# Patient Record
Sex: MALE | Race: WHITE | NOT HISPANIC OR LATINO | URBAN - METROPOLITAN AREA
[De-identification: names, ages, dates, MRNs, and addresses within clinical notes are randomized per-mention and may not be internally consistent; named-entity substitution may affect disease eponyms.]

---

## 2021-05-22 ENCOUNTER — INPATIENT (INPATIENT)
Facility: HOSPITAL | Age: 66
LOS: 5 days | Discharge: HOME HEALTH | DRG: 853 | End: 2021-05-28
Attending: HOSPITALIST | Admitting: HOSPITALIST
Payer: MEDICARE

## 2021-05-22 VITALS
HEART RATE: 109 BPM | DIASTOLIC BLOOD PRESSURE: 85 MMHG | SYSTOLIC BLOOD PRESSURE: 129 MMHG | HEIGHT: 71 IN | WEIGHT: 229.94 LBS | OXYGEN SATURATION: 96 % | TEMPERATURE: 99 F | RESPIRATION RATE: 18 BRPM

## 2021-05-22 DIAGNOSIS — N12 TUBULO-INTERSTITIAL NEPHRITIS, NOT SPECIFIED AS ACUTE OR CHRONIC: ICD-10-CM

## 2021-05-22 LAB
ALBUMIN SERPL ELPH-MCNC: 3.1 G/DL — LOW (ref 3.3–5)
ALP SERPL-CCNC: 126 U/L — HIGH (ref 40–120)
ALT FLD-CCNC: 81 U/L — HIGH (ref 10–45)
ANION GAP SERPL CALC-SCNC: 18 MMOL/L — HIGH (ref 5–17)
APPEARANCE UR: ABNORMAL
AST SERPL-CCNC: 76 U/L — HIGH (ref 10–40)
BACTERIA # UR AUTO: ABNORMAL
BASE EXCESS BLDV CALC-SCNC: 2.9 MMOL/L — HIGH (ref -2–2)
BASE EXCESS BLDV CALC-SCNC: 3.3 MMOL/L — HIGH (ref -2–2)
BASOPHILS # BLD AUTO: 0 K/UL — SIGNIFICANT CHANGE UP (ref 0–0.2)
BASOPHILS NFR BLD AUTO: 0 % — SIGNIFICANT CHANGE UP (ref 0–2)
BILIRUB SERPL-MCNC: 1 MG/DL — SIGNIFICANT CHANGE UP (ref 0.2–1.2)
BILIRUB UR-MCNC: NEGATIVE — SIGNIFICANT CHANGE UP
BUN SERPL-MCNC: 16 MG/DL — SIGNIFICANT CHANGE UP (ref 7–23)
CA-I SERPL-SCNC: 1.04 MMOL/L — LOW (ref 1.12–1.3)
CA-I SERPL-SCNC: 1.08 MMOL/L — LOW (ref 1.12–1.3)
CALCIUM SERPL-MCNC: 9.5 MG/DL — SIGNIFICANT CHANGE UP (ref 8.4–10.5)
CHLORIDE BLDV-SCNC: 97 MMOL/L — SIGNIFICANT CHANGE UP (ref 96–108)
CHLORIDE BLDV-SCNC: 98 MMOL/L — SIGNIFICANT CHANGE UP (ref 96–108)
CHLORIDE SERPL-SCNC: 95 MMOL/L — LOW (ref 96–108)
CO2 BLDV-SCNC: 27 MMOL/L — SIGNIFICANT CHANGE UP (ref 22–30)
CO2 BLDV-SCNC: 28 MMOL/L — SIGNIFICANT CHANGE UP (ref 22–30)
CO2 SERPL-SCNC: 20 MMOL/L — LOW (ref 22–31)
COLOR SPEC: YELLOW — SIGNIFICANT CHANGE UP
CREAT SERPL-MCNC: 0.88 MG/DL — SIGNIFICANT CHANGE UP (ref 0.5–1.3)
DIFF PNL FLD: ABNORMAL
EOSINOPHIL # BLD AUTO: 0 K/UL — SIGNIFICANT CHANGE UP (ref 0–0.5)
EOSINOPHIL NFR BLD AUTO: 0 % — SIGNIFICANT CHANGE UP (ref 0–6)
EPI CELLS # UR: 1 /HPF — SIGNIFICANT CHANGE UP
GAS PNL BLDV: 128 MMOL/L — LOW (ref 135–145)
GAS PNL BLDV: 130 MMOL/L — LOW (ref 135–145)
GAS PNL BLDV: SIGNIFICANT CHANGE UP
GLUCOSE BLDV-MCNC: 163 MG/DL — HIGH (ref 70–99)
GLUCOSE BLDV-MCNC: 238 MG/DL — HIGH (ref 70–99)
GLUCOSE SERPL-MCNC: 230 MG/DL — HIGH (ref 70–99)
GLUCOSE UR QL: NEGATIVE — SIGNIFICANT CHANGE UP
HCO3 BLDV-SCNC: 26 MMOL/L — SIGNIFICANT CHANGE UP (ref 21–29)
HCO3 BLDV-SCNC: 26 MMOL/L — SIGNIFICANT CHANGE UP (ref 21–29)
HCT VFR BLD CALC: 38.6 % — LOW (ref 39–50)
HCT VFR BLDA CALC: 36 % — LOW (ref 39–50)
HCT VFR BLDA CALC: 40 % — SIGNIFICANT CHANGE UP (ref 39–50)
HGB BLD CALC-MCNC: 11.6 G/DL — LOW (ref 13–17)
HGB BLD CALC-MCNC: 12.9 G/DL — LOW (ref 13–17)
HGB BLD-MCNC: 12.8 G/DL — LOW (ref 13–17)
HYALINE CASTS # UR AUTO: 0 /LPF — SIGNIFICANT CHANGE UP (ref 0–7)
KETONES UR-MCNC: NEGATIVE — SIGNIFICANT CHANGE UP
LACTATE BLDV-MCNC: 2.9 MMOL/L — HIGH (ref 0.7–2)
LACTATE BLDV-MCNC: 3 MMOL/L — HIGH (ref 0.7–2)
LEUKOCYTE ESTERASE UR-ACNC: ABNORMAL
LYMPHOCYTES # BLD AUTO: 0.99 K/UL — LOW (ref 1–3.3)
LYMPHOCYTES # BLD AUTO: 6.9 % — LOW (ref 13–44)
MANUAL SMEAR VERIFICATION: SIGNIFICANT CHANGE UP
MCHC RBC-ENTMCNC: 27.4 PG — SIGNIFICANT CHANGE UP (ref 27–34)
MCHC RBC-ENTMCNC: 33.2 GM/DL — SIGNIFICANT CHANGE UP (ref 32–36)
MCV RBC AUTO: 82.7 FL — SIGNIFICANT CHANGE UP (ref 80–100)
MONOCYTES # BLD AUTO: 2.48 K/UL — HIGH (ref 0–0.9)
MONOCYTES NFR BLD AUTO: 17.2 % — HIGH (ref 2–14)
NEUTROPHILS # BLD AUTO: 10.94 K/UL — HIGH (ref 1.8–7.4)
NEUTROPHILS NFR BLD AUTO: 75.9 % — SIGNIFICANT CHANGE UP (ref 43–77)
NITRITE UR-MCNC: NEGATIVE — SIGNIFICANT CHANGE UP
PCO2 BLDV: 36 MMHG — SIGNIFICANT CHANGE UP (ref 35–50)
PCO2 BLDV: 36 MMHG — SIGNIFICANT CHANGE UP (ref 35–50)
PH BLDV: 7.47 — HIGH (ref 7.35–7.45)
PH BLDV: 7.48 — HIGH (ref 7.35–7.45)
PH UR: 6.5 — SIGNIFICANT CHANGE UP (ref 5–8)
PLAT MORPH BLD: NORMAL — SIGNIFICANT CHANGE UP
PLATELET # BLD AUTO: 279 K/UL — SIGNIFICANT CHANGE UP (ref 150–400)
PO2 BLDV: 46 MMHG — HIGH (ref 25–45)
PO2 BLDV: 47 MMHG — HIGH (ref 25–45)
POTASSIUM BLDV-SCNC: 3.2 MMOL/L — LOW (ref 3.5–5.3)
POTASSIUM BLDV-SCNC: 4.4 MMOL/L — SIGNIFICANT CHANGE UP (ref 3.5–5.3)
POTASSIUM SERPL-MCNC: 3.2 MMOL/L — LOW (ref 3.5–5.3)
POTASSIUM SERPL-SCNC: 3.2 MMOL/L — LOW (ref 3.5–5.3)
PROT SERPL-MCNC: 7.2 G/DL — SIGNIFICANT CHANGE UP (ref 6–8.3)
PROT UR-MCNC: ABNORMAL
RBC # BLD: 4.67 M/UL — SIGNIFICANT CHANGE UP (ref 4.2–5.8)
RBC # FLD: 14.3 % — SIGNIFICANT CHANGE UP (ref 10.3–14.5)
RBC BLD AUTO: SIGNIFICANT CHANGE UP
RBC CASTS # UR COMP ASSIST: 28 /HPF — HIGH (ref 0–4)
SAO2 % BLDV: 85 % — SIGNIFICANT CHANGE UP (ref 67–88)
SAO2 % BLDV: 86 % — SIGNIFICANT CHANGE UP (ref 67–88)
SARS-COV-2 RNA SPEC QL NAA+PROBE: SIGNIFICANT CHANGE UP
SODIUM SERPL-SCNC: 133 MMOL/L — LOW (ref 135–145)
SP GR SPEC: >1.05 (ref 1.01–1.02)
UROBILINOGEN FLD QL: NEGATIVE — SIGNIFICANT CHANGE UP
WBC # BLD: 14.42 K/UL — HIGH (ref 3.8–10.5)
WBC # FLD AUTO: 14.42 K/UL — HIGH (ref 3.8–10.5)
WBC UR QL: 3137 /HPF — HIGH (ref 0–5)

## 2021-05-22 PROCEDURE — G1004: CPT

## 2021-05-22 PROCEDURE — 99285 EMERGENCY DEPT VISIT HI MDM: CPT

## 2021-05-22 PROCEDURE — 74177 CT ABD & PELVIS W/CONTRAST: CPT | Mod: 26,MG

## 2021-05-22 RX ORDER — CEFTRIAXONE 500 MG/1
1000 INJECTION, POWDER, FOR SOLUTION INTRAMUSCULAR; INTRAVENOUS ONCE
Refills: 0 | Status: COMPLETED | OUTPATIENT
Start: 2021-05-22 | End: 2021-05-22

## 2021-05-22 RX ORDER — SODIUM CHLORIDE 9 MG/ML
1000 INJECTION, SOLUTION INTRAVENOUS ONCE
Refills: 0 | Status: COMPLETED | OUTPATIENT
Start: 2021-05-22 | End: 2021-05-22

## 2021-05-22 RX ORDER — SODIUM CHLORIDE 9 MG/ML
2000 INJECTION, SOLUTION INTRAVENOUS ONCE
Refills: 0 | Status: COMPLETED | OUTPATIENT
Start: 2021-05-22 | End: 2021-05-22

## 2021-05-22 RX ADMIN — CEFTRIAXONE 100 MILLIGRAM(S): 500 INJECTION, POWDER, FOR SOLUTION INTRAMUSCULAR; INTRAVENOUS at 20:27

## 2021-05-22 RX ADMIN — SODIUM CHLORIDE 1000 MILLILITER(S): 9 INJECTION, SOLUTION INTRAVENOUS at 19:07

## 2021-05-22 RX ADMIN — SODIUM CHLORIDE 2000 MILLILITER(S): 9 INJECTION, SOLUTION INTRAVENOUS at 23:57

## 2021-05-22 NOTE — ED PROVIDER NOTE - PROGRESS NOTE DETAILS
Czech  [id number]   Updated patient on CT results and plan for admission.  Explained finding of 2cm L stone with severe hydro.  Also discussed pancreatic mass finding with patient and need to follow up.  Patient verbalized understanding.

## 2021-05-22 NOTE — CONSULT NOTE ADULT - ASSESSMENT
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 In ED: white count 14.2, Cr 0.88, vitals stable, rosado placed in ED due to inability to void for several hours, UA with large leuks, few bacteria and WBC’s. 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   Recs:  -no acute urological intervention given the size and position of the stone, pt is not a candidate for stent placement   -requires urgent left nephrostomy tube placement with IR   -continue abx for UTI   -f/u urine culture     case discussed with on call attending Dr. Nieto

## 2021-05-22 NOTE — ED PROVIDER NOTE - PHYSICAL EXAMINATION
General appearance: NAD, conversant, afebrile    Neck: Trachea midline; Full range of motion, supple   Pulm: CTA bl, normal respiratory effort and no intercostal retractions, normal work of breathing   CV: RRR, No murmurs, rubs, or gallops   Abdomen: Soft, non-tender, non-distended; no guarding or rebound   Back: + L CVAT   Extremities: No peripheral edema    Skin: Dry, normal temperature, turgor and texture; no rash   Psych: Appropriate affect, cooperative; alert and oriented to person, place and time;

## 2021-05-22 NOTE — ED PROVIDER NOTE - ATTENDING CONTRIBUTION TO CARE
Pt with LEFT flank pain, fever, h/o stones.  Exam: nontoxic appearing, +td L flank, no rash, nontender abdomen.

## 2021-05-22 NOTE — ED PROVIDER NOTE - OBJECTIVE STATEMENT
66yo male with with pmh dm on metformin, renal colic, presenting with left flank pain.  History obtained from daughter per patient request.  He has been having left sided flank pain over the past week.  Endorsing dysuria.  Was started on unknown antibiotic, daughter believes was cephalexin.  Took three days starting tues and began feeling better so he stopped taking it.  Now having fevers and generalized weakness.

## 2021-05-22 NOTE — ED ADULT NURSE REASSESSMENT NOTE - NS ED NURSE REASSESS COMMENT FT1
Report taken from HECTOR Daley. Pt resting comfortably in bed. VSS. PIV intact. Will continue to monitor

## 2021-05-22 NOTE — ED ADULT NURSE NOTE - OBJECTIVE STATEMENT
65y male presenting to ED complaining of left flank pain and LLQ abd pain. pt states this has been going on for 4 days, pt states he was being treated for UTI with Keflex and finished his course now presenting with 4 days of pain, unable to tolerate PO intake and has not made a BM for 7days pt states he has been urinating bloody urine but for the past few days it has turned brown in color. . pt denies chest pain, VD, SOB, fever/chills, headaches, dizziness,  burning upon urination, pt is A&Ox4 breathing spontaneous and unlabored on RA, abd is soft and nontender on palpation, skin is warm, dry, and appropriate for race. 65y male presenting to ED complaining of left flank pain and LLQ abd pain. pt states this has been going on for 4 days, pt states he was being treated for UTI with Keflex did not finish his course of Abx now presenting with 4 days of pain, unable to tolerate PO intake and has not made a BM for 7days pt states he has been urinating bloody urine but for the past few days it has turned brown in color. . pt denies chest pain, VD, SOB, fever/chills, headaches, dizziness,  burning upon urination, pt is A&Ox4 breathing spontaneous and unlabored on RA, abd is soft and nontender on palpation, skin is warm, dry, and appropriate for race.

## 2021-05-22 NOTE — ED PROVIDER NOTE - CLINICAL SUMMARY MEDICAL DECISION MAKING FREE TEXT BOX
Dr. Brush Note: flank pain and fever in setting of previous kidney stone history, r/o calculous pyelonephritis vs complicated pyelo, labs, ct scan, fluids, antibiotics

## 2021-05-22 NOTE — CONSULT NOTE ADULT - SUBJECTIVE AND OBJECTIVE BOX
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 Pt endorses constant left lower quadrant pain without radiation, unrelieved with Tylenol accompanied with weakness, fatigue, decreased appetite with vomiting and nausea however states has been able to keep food down for the past few days, 20 pound weight loss in past 1 month, dysuria x 1 week and hematuria 1 week ago which has resolved now. Pt also endorsing fever at home 4 days ago with highest temp of 102. Pt last saw urologist a few months ago and had US which was benign, pt with history of several stones in left kidney and spontaneously passed all the stones without intervention, last time he passed a stone was 4 months ago. Denies flank pain, chest pain, SOB.     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    PAST MEDICAL & SURGICAL HISTORY:  Diabetes    Kidney stone on left side        MEDICATIONS  (STANDING):    MEDICATIONS  (PRN):      FAMILY HISTORY:      Allergies    No Known Allergies    Intolerances        SOCIAL HISTORY:    REVIEW OF SYSTEMS: Otherwise negative as stated in HPI    Physical Exam  Vital signs  T(C): 37.1 (05-22-21 @ 20:17), Max: 37.4 (05-22-21 @ 17:06)  HR: 90 (05-22-21 @ 20:17)  BP: 122/73 (05-22-21 @ 20:17)  SpO2: 96% (05-22-21 @ 20:17)  Wt(kg): --      Exam:  General: NAD, awake, alert   Respiratory: no respiratory distress   Abd: soft, nontender, nondistended  Back: no CVAT bilaterally   : rosado in place draining yellow purulent urine                            LABS:      05-22 @ 19:13    WBC 14.42 / Hct 38.6  / SCr 0.88     05-22    133<L>  |  95<L>  |  16  ----------------------------<  230<H>  3.2<L>   |  20<L>  |  0.88    Ca    9.5      22 May 2021 19:13    TPro  7.2  /  Alb  3.1<L>  /  TBili  1.0  /  DBili  x   /  AST  76<H>  /  ALT  81<H>  /  AlkPhos  126<H>  05-22      Urinalysis Basic - ( 22 May 2021 21:19 )    Color: Yellow / Appearance: Turbid / SG: >1.050 / pH: x  Gluc: x / Ketone: Negative  / Bili: Negative / Urobili: Negative   Blood: x / Protein: 100 mg/dL / Nitrite: Negative   Leuk Esterase: Large / RBC: 28 /hpf / WBC 3137 /HPF   Sq Epi: x / Non Sq Epi: 1 /hpf / Bacteria: Few        Urine Cx: pending   Blood Cx: pending     RADIOLOGY:  < from: CT Abdomen and Pelvis w/ IV Cont (05.22.21 @ 19:50) >  EXAM:  CT ABDOMEN AND PELVIS IC                            PROCEDURE DATE:  05/22/2021            INTERPRETATION:  CLINICAL INFORMATION: Left-sided flank pain. Fevers.    COMPARISON: None.    CONTRAST/COMPLICATIONS:  IV Contrast: 90 cc of [id number] was administered. 10 cc of contrast was discarded.  Oral Contrast: None.  Complications: None reported.    PROCEDURE:  CT of the Abdomen and Pelvis was performed.  Sagittal and coronal reformats were performed.    FINDINGS:  LOWER CHEST: Small hiatal hernia    LIVER: Within normal limits.  BILE DUCTS: Normal caliber.  GALLBLADDER: Within normal limits.  SPLEEN: Within normal limits.  PANCREAS: Abnormal ill-defined low attenuating mass seen in the body of the pancreas measuring approximately 3.6 x 3.0 cm (series 2 image 38).  ADRENALS: The right adrenal gland is unremarkable. Indeterminate 1.5 cm left adrenal gland nodule.  KIDNEYS/URETERS: On the right side, there is a 1.6 x 0.7 cm low attenuation within the right interpolar region that is indeterminate. Subcentimeter low attenuations within the right renal pole that are too small to characterize. No right hydronephrosis or nephrolithiasis.    On the left side, there is severe hydronephrosis secondary to a 2 x 1.2 cm calculus in the proximal left ureter. Delayed left renal nephrogram with urothelial enhancement concerning for infection. Subtle foci of left renal parenchymal defects suspicious for pyelonephritis.  A 1.8 cm low attenuating lesion in the lower pole that is indeterminate. Additional subcentimeter hypodense foci too small to characterize. Additional 1.4 x 1.1 cm calculus in the left renal pelvis.    BLADDER: Within normal limits.  REPRODUCTIVE ORGANS: The prostate is normal in size.    BOWEL: No bowel obstruction. Appendix is normal.  PERITONEUM: No ascites.  VESSELS: Splenic vein thrombosis. Mild compression of the splenic artery secondary to the pancreatic mass. The common hepatic artery is partially obscured by the pancreatic mass.  RETROPERITONEUM/LYMPH NODES: Prominent retroperitoneal lymph nodes including a 1.3 x 0.8 cm lymph node adjacent to the left kidney (series 2 image 61).  ABDOMINAL WALL: Within normal limits.  BONES: Degenerative changes.    IMPRESSION:    Severe left hydronephrosis related toa 2 cm stone in the left proximal ureter.  Left renal parenchymal hypodense defects suspicious for pyelonephritis. Pyelitis.  A 1.4 cm left lower pole calyceal stone.    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  Indeterminate left adrenal gland lesion.    These findings were discussed with Dr. Brush at 5/22/2021 8:22 PM by Dr. Lobato with read back confirmation.  Recommend dedicated MRI/MRCP for vessel involvement.  Indeterminate bilateral renal lesions as described above.              JILLIAN LOBATO MD; Resident Radiology  This document has been electronically signed.  TERRI PERSAUD MD; Attending Radiologist  This document has been electronically signed. May 22 2021  9:18PM    < end of copied text >

## 2021-05-22 NOTE — ED PROVIDER NOTE - CCCP TRG CHIEF CMPLNT
flank pain, decreased appetite lab results/return to ED if symptoms worsen, persist or questions arise/need for outpatient follow-up

## 2021-05-22 NOTE — ED PROVIDER NOTE - CARE PLAN
Principal Discharge DX:	Pyelonephritis  Secondary Diagnosis:	Renal colic on left side  Secondary Diagnosis:	Pancreatic mass

## 2021-05-22 NOTE — ED ADULT NURSE NOTE - CHIEF COMPLAINT QUOTE
had blood in urina, fevers, resolved, took abx, now has decreased appetite, l flank pain persists    dtr Valia [phone]

## 2021-05-22 NOTE — ED ADULT TRIAGE NOTE - CHIEF COMPLAINT QUOTE
had blood in urina, fevers, resolved, took abx, now has decreased appetite, l flank pain persists    dtr Valia [phone]

## 2021-05-23 DIAGNOSIS — N12 TUBULO-INTERSTITIAL NEPHRITIS, NOT SPECIFIED AS ACUTE OR CHRONIC: ICD-10-CM

## 2021-05-23 DIAGNOSIS — I82.890 ACUTE EMBOLISM AND THROMBOSIS OF OTHER SPECIFIED VEINS: ICD-10-CM

## 2021-05-23 DIAGNOSIS — Z00.00 ENCOUNTER FOR GENERAL ADULT MEDICAL EXAMINATION WITHOUT ABNORMAL FINDINGS: ICD-10-CM

## 2021-05-23 DIAGNOSIS — N13.2 HYDRONEPHROSIS WITH RENAL AND URETERAL CALCULOUS OBSTRUCTION: ICD-10-CM

## 2021-05-23 DIAGNOSIS — E11.9 TYPE 2 DIABETES MELLITUS WITHOUT COMPLICATIONS: ICD-10-CM

## 2021-05-23 DIAGNOSIS — K86.89 OTHER SPECIFIED DISEASES OF PANCREAS: ICD-10-CM

## 2021-05-23 LAB
-  COAGULASE NEGATIVE STAPHYLOCOCCUS: SIGNIFICANT CHANGE UP
ALBUMIN SERPL ELPH-MCNC: 2.8 G/DL — LOW (ref 3.3–5)
ALP SERPL-CCNC: 107 U/L — SIGNIFICANT CHANGE UP (ref 40–120)
ALT FLD-CCNC: 64 U/L — HIGH (ref 10–45)
ANION GAP SERPL CALC-SCNC: 14 MMOL/L — SIGNIFICANT CHANGE UP (ref 5–17)
APTT BLD: 28.8 SEC — SIGNIFICANT CHANGE UP (ref 27.5–35.5)
AST SERPL-CCNC: 52 U/L — HIGH (ref 10–40)
BASE EXCESS BLDV CALC-SCNC: 5.5 MMOL/L — HIGH (ref -2–2)
BASOPHILS # BLD AUTO: 0.05 K/UL — SIGNIFICANT CHANGE UP (ref 0–0.2)
BASOPHILS NFR BLD AUTO: 0.4 % — SIGNIFICANT CHANGE UP (ref 0–2)
BILIRUB SERPL-MCNC: 0.9 MG/DL — SIGNIFICANT CHANGE UP (ref 0.2–1.2)
BLD GP AB SCN SERPL QL: NEGATIVE — SIGNIFICANT CHANGE UP
BUN SERPL-MCNC: 11 MG/DL — SIGNIFICANT CHANGE UP (ref 7–23)
CA-I SERPL-SCNC: 1.08 MMOL/L — LOW (ref 1.12–1.3)
CALCIUM SERPL-MCNC: 8.7 MG/DL — SIGNIFICANT CHANGE UP (ref 8.4–10.5)
CHLORIDE BLDV-SCNC: 98 MMOL/L — SIGNIFICANT CHANGE UP (ref 96–108)
CHLORIDE SERPL-SCNC: 96 MMOL/L — SIGNIFICANT CHANGE UP (ref 96–108)
CO2 BLDV-SCNC: 30 MMOL/L — SIGNIFICANT CHANGE UP (ref 22–30)
CO2 SERPL-SCNC: 23 MMOL/L — SIGNIFICANT CHANGE UP (ref 22–31)
CREAT SERPL-MCNC: 0.76 MG/DL — SIGNIFICANT CHANGE UP (ref 0.5–1.3)
EOSINOPHIL # BLD AUTO: 0.2 K/UL — SIGNIFICANT CHANGE UP (ref 0–0.5)
EOSINOPHIL NFR BLD AUTO: 1.5 % — SIGNIFICANT CHANGE UP (ref 0–6)
GAS PNL BLDV: 130 MMOL/L — LOW (ref 135–145)
GAS PNL BLDV: SIGNIFICANT CHANGE UP
GLUCOSE BLDV-MCNC: 177 MG/DL — HIGH (ref 70–99)
GLUCOSE SERPL-MCNC: 167 MG/DL — HIGH (ref 70–99)
GRAM STN FLD: SIGNIFICANT CHANGE UP
HCO3 BLDV-SCNC: 29 MMOL/L — SIGNIFICANT CHANGE UP (ref 21–29)
HCT VFR BLD CALC: 35.3 % — LOW (ref 39–50)
HCT VFR BLDA CALC: 37 % — LOW (ref 39–50)
HGB BLD CALC-MCNC: 12.1 G/DL — LOW (ref 13–17)
HGB BLD-MCNC: 11.6 G/DL — LOW (ref 13–17)
IMM GRANULOCYTES NFR BLD AUTO: 6.2 % — HIGH (ref 0–1.5)
LACTATE BLDV-MCNC: 1.5 MMOL/L — SIGNIFICANT CHANGE UP (ref 0.7–2)
LYMPHOCYTES # BLD AUTO: 1.39 K/UL — SIGNIFICANT CHANGE UP (ref 1–3.3)
LYMPHOCYTES # BLD AUTO: 10.1 % — LOW (ref 13–44)
MAGNESIUM SERPL-MCNC: 1.8 MG/DL — SIGNIFICANT CHANGE UP (ref 1.6–2.6)
MCHC RBC-ENTMCNC: 26.9 PG — LOW (ref 27–34)
MCHC RBC-ENTMCNC: 32.9 GM/DL — SIGNIFICANT CHANGE UP (ref 32–36)
MCV RBC AUTO: 81.7 FL — SIGNIFICANT CHANGE UP (ref 80–100)
METHOD TYPE: SIGNIFICANT CHANGE UP
MONOCYTES # BLD AUTO: 1.08 K/UL — HIGH (ref 0–0.9)
MONOCYTES NFR BLD AUTO: 7.8 % — SIGNIFICANT CHANGE UP (ref 2–14)
NEUTROPHILS # BLD AUTO: 10.2 K/UL — HIGH (ref 1.8–7.4)
NEUTROPHILS NFR BLD AUTO: 74 % — SIGNIFICANT CHANGE UP (ref 43–77)
NRBC # BLD: 0 /100 WBCS — SIGNIFICANT CHANGE UP (ref 0–0)
OTHER CELLS CSF MANUAL: 16 ML/DL — LOW (ref 18–22)
PCO2 BLDV: 38 MMHG — SIGNIFICANT CHANGE UP (ref 35–50)
PH BLDV: 7.49 — HIGH (ref 7.35–7.45)
PHOSPHATE SERPL-MCNC: 3.2 MG/DL — SIGNIFICANT CHANGE UP (ref 2.5–4.5)
PLATELET # BLD AUTO: 270 K/UL — SIGNIFICANT CHANGE UP (ref 150–400)
PO2 BLDV: 63 MMHG — HIGH (ref 25–45)
POTASSIUM BLDV-SCNC: 3.1 MMOL/L — LOW (ref 3.5–5.3)
POTASSIUM SERPL-MCNC: 3.4 MMOL/L — LOW (ref 3.5–5.3)
POTASSIUM SERPL-SCNC: 3.4 MMOL/L — LOW (ref 3.5–5.3)
PROT SERPL-MCNC: 6.6 G/DL — SIGNIFICANT CHANGE UP (ref 6–8.3)
RBC # BLD: 4.32 M/UL — SIGNIFICANT CHANGE UP (ref 4.2–5.8)
RBC # FLD: 14.3 % — SIGNIFICANT CHANGE UP (ref 10.3–14.5)
RH IG SCN BLD-IMP: NEGATIVE — SIGNIFICANT CHANGE UP
SAO2 % BLDV: 94 % — HIGH (ref 67–88)
SODIUM SERPL-SCNC: 133 MMOL/L — LOW (ref 135–145)
SPECIMEN SOURCE: SIGNIFICANT CHANGE UP
WBC # BLD: 13.78 K/UL — HIGH (ref 3.8–10.5)
WBC # FLD AUTO: 13.78 K/UL — HIGH (ref 3.8–10.5)

## 2021-05-23 PROCEDURE — 50432 PLMT NEPHROSTOMY CATHETER: CPT | Mod: LT

## 2021-05-23 PROCEDURE — 99223 1ST HOSP IP/OBS HIGH 75: CPT | Mod: GC

## 2021-05-23 RX ORDER — DEXTROSE 50 % IN WATER 50 %
25 SYRINGE (ML) INTRAVENOUS ONCE
Refills: 0 | Status: DISCONTINUED | OUTPATIENT
Start: 2021-05-23 | End: 2021-05-28

## 2021-05-23 RX ORDER — ONDANSETRON 8 MG/1
4 TABLET, FILM COATED ORAL ONCE
Refills: 0 | Status: DISCONTINUED | OUTPATIENT
Start: 2021-05-23 | End: 2021-05-23

## 2021-05-23 RX ORDER — INSULIN LISPRO 100/ML
VIAL (ML) SUBCUTANEOUS
Refills: 0 | Status: DISCONTINUED | OUTPATIENT
Start: 2021-05-23 | End: 2021-05-28

## 2021-05-23 RX ORDER — SODIUM CHLORIDE 9 MG/ML
1000 INJECTION, SOLUTION INTRAVENOUS
Refills: 0 | Status: DISCONTINUED | OUTPATIENT
Start: 2021-05-23 | End: 2021-05-28

## 2021-05-23 RX ORDER — ACETAMINOPHEN 500 MG
1000 TABLET ORAL ONCE
Refills: 0 | Status: DISCONTINUED | OUTPATIENT
Start: 2021-05-23 | End: 2021-05-23

## 2021-05-23 RX ORDER — POTASSIUM CHLORIDE 20 MEQ
10 PACKET (EA) ORAL
Refills: 0 | Status: COMPLETED | OUTPATIENT
Start: 2021-05-23 | End: 2021-05-23

## 2021-05-23 RX ORDER — DEXTROSE 50 % IN WATER 50 %
12.5 SYRINGE (ML) INTRAVENOUS ONCE
Refills: 0 | Status: DISCONTINUED | OUTPATIENT
Start: 2021-05-23 | End: 2021-05-28

## 2021-05-23 RX ORDER — GLUCAGON INJECTION, SOLUTION 0.5 MG/.1ML
1 INJECTION, SOLUTION SUBCUTANEOUS ONCE
Refills: 0 | Status: DISCONTINUED | OUTPATIENT
Start: 2021-05-23 | End: 2021-05-28

## 2021-05-23 RX ORDER — DEXTROSE 50 % IN WATER 50 %
15 SYRINGE (ML) INTRAVENOUS ONCE
Refills: 0 | Status: DISCONTINUED | OUTPATIENT
Start: 2021-05-23 | End: 2021-05-28

## 2021-05-23 RX ORDER — INSULIN LISPRO 100/ML
VIAL (ML) SUBCUTANEOUS AT BEDTIME
Refills: 0 | Status: DISCONTINUED | OUTPATIENT
Start: 2021-05-23 | End: 2021-05-28

## 2021-05-23 RX ORDER — PIPERACILLIN AND TAZOBACTAM 4; .5 G/20ML; G/20ML
3.38 INJECTION, POWDER, LYOPHILIZED, FOR SOLUTION INTRAVENOUS ONCE
Refills: 0 | Status: COMPLETED | OUTPATIENT
Start: 2021-05-23 | End: 2021-05-23

## 2021-05-23 RX ORDER — PIPERACILLIN AND TAZOBACTAM 4; .5 G/20ML; G/20ML
3.38 INJECTION, POWDER, LYOPHILIZED, FOR SOLUTION INTRAVENOUS EVERY 8 HOURS
Refills: 0 | Status: DISCONTINUED | OUTPATIENT
Start: 2021-05-23 | End: 2021-05-25

## 2021-05-23 RX ADMIN — Medication 100 MILLIEQUIVALENT(S): at 09:57

## 2021-05-23 RX ADMIN — Medication 1: at 17:51

## 2021-05-23 RX ADMIN — Medication 100 MILLIEQUIVALENT(S): at 12:07

## 2021-05-23 RX ADMIN — PIPERACILLIN AND TAZOBACTAM 25 GRAM(S): 4; .5 INJECTION, POWDER, LYOPHILIZED, FOR SOLUTION INTRAVENOUS at 18:11

## 2021-05-23 RX ADMIN — PIPERACILLIN AND TAZOBACTAM 200 GRAM(S): 4; .5 INJECTION, POWDER, LYOPHILIZED, FOR SOLUTION INTRAVENOUS at 11:33

## 2021-05-23 RX ADMIN — Medication 100 MILLIEQUIVALENT(S): at 08:24

## 2021-05-23 NOTE — PROCEDURE NOTE - PROCEDURE FINDINGS AND DETAILS
Limited US of the left kidney showed mild hydronephrosis and a small renal collection. Successful PCN insertion yielding purulent fluid. The renal collection was noted to get smaller after the PCN therefore decided against performing a second drainage. Limited US of the left kidney showed mild hydronephrosis and a small renal collection. Successful PCN insertion yielding purulent fluid. The renal collection was noted to get significantly smaller after the PCN therefore decided against performing a second drainage.

## 2021-05-23 NOTE — H&P ADULT - NSHPLABSRESULTS_GEN_ALL_CORE
11.6   13.78 )-----------( 270      ( 23 May 2021 11:30 )             35.3       05-22    133<L>  |  95<L>  |  16  ----------------------------<  230<H>  3.2<L>   |  20<L>  |  0.88    Ca    9.5      22 May 2021 19:13    TPro  7.2  /  Alb  3.1<L>  /  TBili  1.0  /  DBili  x   /  AST  76<H>  /  ALT  81<H>  /  AlkPhos  126<H>  05-22              Urinalysis Basic - ( 22 May 2021 21:19 )    Color: Yellow / Appearance: Turbid / SG: >1.050 / pH: x  Gluc: x / Ketone: Negative  / Bili: Negative / Urobili: Negative   Blood: x / Protein: 100 mg/dL / Nitrite: Negative   Leuk Esterase: Large / RBC: 28 /hpf / WBC 3137 /HPF   Sq Epi: x / Non Sq Epi: 1 /hpf / Bacteria: Few        PT/INR - ( 23 May 2021 11:30 )   PT: 15.8 sec;   INR: 1.33 ratio         PTT - ( 23 May 2021 11:30 )  PTT:28.8 sec    Lactate Trend            CAPILLARY BLOOD GLUCOSE      POCT Blood Glucose.: 157 mg/dL (23 May 2021 01:07)      11:30 - VBG - pH: 7.49  | pCO2: 38    | pO2: 63    | Lactate: 1.5    22:04 - VBG - pH: 7.47  | pCO2: 36    | pO2: 46    | Lactate: 2.9    19:13 - VBG - pH: 7.48  | pCO2: 36    | pO2: 47    | Lactate: 3.0    < from: CT Abdomen and Pelvis w/ IV Cont (05.22.21 @ 19:50) >    FINDINGS:  LOWER CHEST: Small hiatal hernia    LIVER: Within normal limits.  BILE DUCTS: Normal caliber.  GALLBLADDER: Within normal limits.  SPLEEN: Within normal limits.  PANCREAS: Abnormal ill-defined low attenuating mass seen in the body of the pancreas measuring approximately 3.6 x 3.0 cm (series 2 image 38).  ADRENALS: The right adrenal gland is unremarkable. Indeterminate 1.5 cm left adrenal gland nodule.  KIDNEYS/URETERS: On the right side, there is a 1.6 x 0.7 cm low attenuation within the right interpolar region that is indeterminate. Subcentimeter low attenuations within the right renal pole that are too small to characterize. No right hydronephrosis or nephrolithiasis.    On the left side, there is severe hydronephrosis secondary to a 2 x 1.2 cm calculus in the proximal left ureter. Delayed left renal nephrogram with urothelial enhancement concerning for infection. Subtle foci of left renal parenchymal defects suspicious for pyelonephritis.  A 1.8 cm low attenuating lesion in the lower pole that is indeterminate. Additional subcentimeter hypodense foci too small to characterize. Additional 1.4 x 1.1 cm calculus in the left renal pelvis.    BLADDER: Within normal limits.  REPRODUCTIVE ORGANS: The prostate is normal in size.    BOWEL: No bowel obstruction. Appendix is normal.  PERITONEUM: No ascites.  VESSELS: Splenic vein thrombosis. Mild compression of the splenic artery secondary to the pancreatic mass. The common hepatic artery is partially obscured by the pancreatic mass.  RETROPERITONEUM/LYMPH NODES: Prominent retroperitoneal lymph nodes including a 1.3 x 0.8 cm lymph node adjacent to the left kidney (series 2 image 61).  ABDOMINAL WALL: Within normal limits.  BONES: Degenerative changes.    IMPRESSION:    Severe left hydronephrosis related toa 2 cm stone in the left proximal ureter.  Left renal parenchymal hypodense defects suspicious for pyelonephritis. Pyelitis.  A 1.4 cm left lower pole calyceal stone.    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  Indeterminate left adrenal gland lesion.    < end of copied text >

## 2021-05-23 NOTE — CONSULT NOTE ADULT - SUBJECTIVE AND OBJECTIVE BOX
Chief Complaint:  Patient is a 65y old  Male who presents with a chief complaint of hydronephrosis (23 May 2021 11:41)      HPI: 65 year old male with pmhx of DM on metformin and nephrolithiasis (urologist in [place]) presents to ED with left lower quadrant pain x 20 days, worsened 1 week ago and decreased urinary output. Pt endorses mild constant left lower quadrant pain without radiation, unrelieved with Tylenol accompanied with weakness, fatigue, decreased appetite with vomiting and nausea however states has been able to keep food down for the past few days, 26 pound weight loss in past 1-2  month, dysuria x 1 week and hematuria 1 week ago which has resolved now. Pt also endorsing fever at home 4 days ago with highest temp of 102. Pt last saw urologist a few months ago and had US which was benign, pt with history of several stones in left kidney and spontaneously passed all the stones without intervention, last time he passed a stone was 4 months ago. Denies flank pain, chest pain, SOB.  Denies n/v/d/c, blood in stool, changes in bowel habits. CT scan done showed 2cm ureteral stone with severe left hydronephrosis requiring emergent perc nephrostomy tube placement. 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     Allergies:  No Known Allergies      Home Medications:    Hospital Medications:  dextrose 40% Gel 15 Gram(s) Oral once  dextrose 5%. 1000 milliLiter(s) IV Continuous <Continuous>  dextrose 5%. 1000 milliLiter(s) IV Continuous <Continuous>  dextrose 50% Injectable 25 Gram(s) IV Push once  dextrose 50% Injectable 12.5 Gram(s) IV Push once  dextrose 50% Injectable 25 Gram(s) IV Push once  glucagon  Injectable 1 milliGRAM(s) IntraMuscular once  insulin lispro (ADMELOG) corrective regimen sliding scale   SubCutaneous three times a day before meals  insulin lispro (ADMELOG) corrective regimen sliding scale   SubCutaneous at bedtime  piperacillin/tazobactam IVPB.. 3.375 Gram(s) IV Intermittent every 8 hours      PMHX/PSHX:  Diabetes    Kidney stone on left side        Family history:      Social History:     ROS:     General:  No weight loss, fevers, chills, night sweats, fatigue  Eyes:  No vision changes, no yellowing of eyes   ENT:  No throat pain, runny nose  CV:  No chest pain, palpitations  Resp:  No SOB, cough, wheezing  GI:  See HPI  :  No burning with urination, no hematuria   Muscle:  No muscle pain, weakness  Neuro:  No numbness/tingling, memory problems  Psych:  No fatigue, insomnia, mood problems  Heme:  No easy bruisability  Skin:  No rash, itching       PHYSICAL EXAM:     GENERAL:  Appears stated age, well-groomed, well-nourished, no distress  HEENT:  NC/AT,  conjunctivae clear and pink,  no JVD  CHEST:  Full & symmetric excursion, no increased effort, breath sounds clear  HEART:  Regular rhythm, S1, S2, no murmur/rub/S3/S4, no abdominal bruit, no edema  ABDOMEN:  Soft, non-tender, non-distended, normoactive bowel sounds,  no masses ,  EXTREMITIES:  no cyanosis,clubbing or edema  SKIN:  No rash/erythema/ecchymoses/petechiae/wounds/abscess/warm/dry  NEURO:  Alert, oriented    Vital Signs:  Vital Signs Last 24 Hrs  T(C): 37.1 (23 May 2021 16:12), Max: 37.4 (22 May 2021 17:06)  T(F): 98.7 (23 May 2021 16:12), Max: 99.3 (22 May 2021 17:06)  HR: 78 (23 May 2021 16:12) (77 - 109)  BP: 151/81 (23 May 2021 16:12) (122/73 - 159/84)  BP(mean): 108 (23 May 2021 15:45) (91 - 118)  RR: 16 (23 May 2021 16:12) (16 - 18)  SpO2: 94% (23 May 2021 16:12) (93% - 97%)  Daily Height in cm: 177.8 (23 May 2021 12:04)    Daily     LABS:                        11.6   13.78 )-----------( 270      ( 23 May 2021 11:30 )             35.3     05-23    133<L>  |  96  |  11  ----------------------------<  167<H>  3.4<L>   |  23  |  0.76    Ca    8.7      23 May 2021 11:30  Phos  3.2     05-23  Mg     1.8     05-23    TPro  6.6  /  Alb  2.8<L>  /  TBili  0.9  /  DBili  x   /  AST  52<H>  /  ALT  64<H>  /  AlkPhos  107  05-23    LIVER FUNCTIONS - ( 23 May 2021 11:30 )  Alb: 2.8 g/dL / Pro: 6.6 g/dL / ALK PHOS: 107 U/L / ALT: 64 U/L / AST: 52 U/L / GGT: x           PT/INR - ( 23 May 2021 11:30 )   PT: 15.8 sec;   INR: 1.33 ratio         PTT - ( 23 May 2021 11:30 )  PTT:28.8 sec  Urinalysis Basic - ( 22 May 2021 21:19 )    Color: Yellow / Appearance: Turbid / SG: >1.050 / pH: x  Gluc: x / Ketone: Negative  / Bili: Negative / Urobili: Negative   Blood: x / Protein: 100 mg/dL / Nitrite: Negative   Leuk Esterase: Large / RBC: 28 /hpf / WBC 3137 /HPF   Sq Epi: x / Non Sq Epi: 1 /hpf / Bacteria: Few          Imaging:    < from: CT Abdomen and Pelvis w/ IV Cont (05.22.21 @ 19:50) >  FINDINGS:  LOWER CHEST: Small hiatal hernia    LIVER: Within normal limits.  BILE DUCTS: Normal caliber.  GALLBLADDER: Within normal limits.  SPLEEN: Within normal limits.  PANCREAS: Abnormal ill-defined low attenuating mass seen in the body of the pancreas measuring approximately 3.6 x 3.0 cm (series 2 image 38).  ADRENALS: The right adrenal gland is unremarkable. Indeterminate 1.5 cm left adrenal gland nodule.  KIDNEYS/URETERS: On the right side, there is a 1.6 x 0.7 cm low attenuation within the right interpolar region that is indeterminate. Subcentimeter low attenuations within the right renal pole that are too small to characterize. No right hydronephrosis or nephrolithiasis.    On the left side, there is severe hydronephrosis secondary to a 2 x 1.2 cm calculus in the proximal left ureter. Delayed left renal nephrogram with urothelial enhancement concerning for infection. Subtle foci of left renal parenchymal defects suspicious for pyelonephritis.  A 1.8 cm low attenuating lesion in the lower pole that is indeterminate. Additional subcentimeter hypodense foci too small to characterize. Additional 1.4 x 1.1 cm calculus in the left renal pelvis.    BLADDER: Within normal limits.  REPRODUCTIVE ORGANS: The prostate is normal in size.    BOWEL: No bowel obstruction. Appendix is normal.  PERITONEUM: No ascites.  VESSELS: Splenic vein thrombosis. Mild compression of the splenic artery secondary to the pancreatic mass. The common hepatic artery is partially obscured by the pancreatic mass.  RETROPERITONEUM/LYMPH NODES: Prominent retroperitoneal lymph nodes including a 1.3 x 0.8 cm lymph node adjacent to the left kidney (series 2 image 61).  ABDOMINAL WALL: Within normal limits.  BONES: Degenerative changes.    IMPRESSION:    Severe left hydronephrosis related toa 2 cm stone in the left proximal ureter.  Left renal parenchymal hypodense defects suspicious for pyelonephritis. Pyelitis.  A 1.4 cm left lower pole calyceal stone.    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  Indeterminate left adrenal gland lesion.    < end of copied text >           Chief Complaint:  Patient is a 65y old  Male who presents with a chief complaint of hydronephrosis (23 May 2021 11:41)      HPI: 65 year old male with pmhx of DM on metformin and nephrolithiasis (urologist in [place]) presents to ED with left lower quadrant pain x 20 days, worsened 1 week ago and decreased urinary output. Pt endorses mild constant left lower quadrant pain without radiation, unrelieved with Tylenol accompanied with weakness, fatigue, decreased appetite with vomiting and nausea however states has been able to keep food down for the past few days, 26 pound weight loss in past 1  month, dysuria x 1 week and hematuria 1 week ago which has resolved now. Pt also endorsing fever at home 4 days ago with highest temp of 102. Pt last saw urologist a few months ago and had US which was benign, pt with history of several stones in left kidney and spontaneously passed all the stones without intervention, last time he passed a stone was 4 months ago. Denies flank pain, chest pain, SOB.  Denies n/v/d/c, blood in stool, changes in bowel habits. CT scan done showed 2cm ureteral stone with severe left hydronephrosis requiring emergent perc nephrostomy tube placement. 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 Never had EGD or colonoscopy. No FH of malignancy. Denies any history of GI issues or pain in the past. Nonsmoker, does not drink alcohol.    Allergies:  No Known Allergies      Home Medications:    Hospital Medications:  dextrose 40% Gel 15 Gram(s) Oral once  dextrose 5%. 1000 milliLiter(s) IV Continuous <Continuous>  dextrose 5%. 1000 milliLiter(s) IV Continuous <Continuous>  dextrose 50% Injectable 25 Gram(s) IV Push once  dextrose 50% Injectable 12.5 Gram(s) IV Push once  dextrose 50% Injectable 25 Gram(s) IV Push once  glucagon  Injectable 1 milliGRAM(s) IntraMuscular once  insulin lispro (ADMELOG) corrective regimen sliding scale   SubCutaneous three times a day before meals  insulin lispro (ADMELOG) corrective regimen sliding scale   SubCutaneous at bedtime  piperacillin/tazobactam IVPB.. 3.375 Gram(s) IV Intermittent every 8 hours      PMHX/PSHX:  Diabetes    Kidney stone on left side        Family history:      Social History:     ROS:     General:  No weight loss, fevers, chills, night sweats, fatigue  Eyes:  No vision changes, no yellowing of eyes   ENT:  No throat pain, runny nose  CV:  No chest pain, palpitations  Resp:  No SOB, cough, wheezing  GI:  See HPI  :  No burning with urination, no hematuria   Muscle:  No muscle pain, weakness  Neuro:  No numbness/tingling, memory problems  Psych:  No fatigue, insomnia, mood problems  Heme:  No easy bruisability  Skin:  No rash, itching       PHYSICAL EXAM:     GENERAL:  Appears stated age, well-groomed, well-nourished, no distress  HEENT:  NC/AT,  conjunctivae clear and pink,  no JVD  CHEST:  Full & symmetric excursion, no increased effort, breath sounds clear  HEART:  Regular rhythm, S1, S2, no murmur/rub/S3/S4, no abdominal bruit, no edema  ABDOMEN:  Soft, non-tender, non-distended, normoactive bowel sounds, left NT in place  EXTREMITIES:  no cyanosis,clubbing or edema  SKIN:  No rash/erythema/ecchymoses/petechiae/wounds/abscess/warm/dry  NEURO:  Alert, oriented    Vital Signs:  Vital Signs Last 24 Hrs  T(C): 37.1 (23 May 2021 16:12), Max: 37.4 (22 May 2021 17:06)  T(F): 98.7 (23 May 2021 16:12), Max: 99.3 (22 May 2021 17:06)  HR: 78 (23 May 2021 16:12) (77 - 109)  BP: 151/81 (23 May 2021 16:12) (122/73 - 159/84)  BP(mean): 108 (23 May 2021 15:45) (91 - 118)  RR: 16 (23 May 2021 16:12) (16 - 18)  SpO2: 94% (23 May 2021 16:12) (93% - 97%)  Daily Height in cm: 177.8 (23 May 2021 12:04)    Daily     LABS:                        11.6   13.78 )-----------( 270      ( 23 May 2021 11:30 )             35.3     05-23    133<L>  |  96  |  11  ----------------------------<  167<H>  3.4<L>   |  23  |  0.76    Ca    8.7      23 May 2021 11:30  Phos  3.2     05-23  Mg     1.8     05-23    TPro  6.6  /  Alb  2.8<L>  /  TBili  0.9  /  DBili  x   /  AST  52<H>  /  ALT  64<H>  /  AlkPhos  107  05-23    LIVER FUNCTIONS - ( 23 May 2021 11:30 )  Alb: 2.8 g/dL / Pro: 6.6 g/dL / ALK PHOS: 107 U/L / ALT: 64 U/L / AST: 52 U/L / GGT: x           PT/INR - ( 23 May 2021 11:30 )   PT: 15.8 sec;   INR: 1.33 ratio         PTT - ( 23 May 2021 11:30 )  PTT:28.8 sec  Urinalysis Basic - ( 22 May 2021 21:19 )    Color: Yellow / Appearance: Turbid / SG: >1.050 / pH: x  Gluc: x / Ketone: Negative  / Bili: Negative / Urobili: Negative   Blood: x / Protein: 100 mg/dL / Nitrite: Negative   Leuk Esterase: Large / RBC: 28 /hpf / WBC 3137 /HPF   Sq Epi: x / Non Sq Epi: 1 /hpf / Bacteria: Few          Imaging:    < from: CT Abdomen and Pelvis w/ IV Cont (05.22.21 @ 19:50) >  FINDINGS:  LOWER CHEST: Small hiatal hernia    LIVER: Within normal limits.  BILE DUCTS: Normal caliber.  GALLBLADDER: Within normal limits.  SPLEEN: Within normal limits.  PANCREAS: Abnormal ill-defined low attenuating mass seen in the body of the pancreas measuring approximately 3.6 x 3.0 cm (series 2 image 38).  ADRENALS: The right adrenal gland is unremarkable. Indeterminate 1.5 cm left adrenal gland nodule.  KIDNEYS/URETERS: On the right side, there is a 1.6 x 0.7 cm low attenuation within the right interpolar region that is indeterminate. Subcentimeter low attenuations within the right renal pole that are too small to characterize. No right hydronephrosis or nephrolithiasis.    On the left side, there is severe hydronephrosis secondary to a 2 x 1.2 cm calculus in the proximal left ureter. Delayed left renal nephrogram with urothelial enhancement concerning for infection. Subtle foci of left renal parenchymal defects suspicious for pyelonephritis.  A 1.8 cm low attenuating lesion in the lower pole that is indeterminate. Additional subcentimeter hypodense foci too small to characterize. Additional 1.4 x 1.1 cm calculus in the left renal pelvis.    BLADDER: Within normal limits.  REPRODUCTIVE ORGANS: The prostate is normal in size.    BOWEL: No bowel obstruction. Appendix is normal.  PERITONEUM: No ascites.  VESSELS: Splenic vein thrombosis. Mild compression of the splenic artery secondary to the pancreatic mass. The common hepatic artery is partially obscured by the pancreatic mass.  RETROPERITONEUM/LYMPH NODES: Prominent retroperitoneal lymph nodes including a 1.3 x 0.8 cm lymph node adjacent to the left kidney (series 2 image 61).  ABDOMINAL WALL: Within normal limits.  BONES: Degenerative changes.    IMPRESSION:    Severe left hydronephrosis related toa 2 cm stone in the left proximal ureter.  Left renal parenchymal hypodense defects suspicious for pyelonephritis. Pyelitis.  A 1.4 cm left lower pole calyceal stone.    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  Indeterminate left adrenal gland lesion.    < end of copied text >

## 2021-05-23 NOTE — H&P ADULT - PROBLEM SELECTOR PLAN 2
Meets SIRs criteria, tachy with elevated WBC, given 2-3L fluid in ED  - lactate went from 3--> 1.5   - given ceftriaxone   - UA positive for WBC, LE  - c/w zosyn   - f/u UCx   - f/u BCx

## 2021-05-23 NOTE — CONSULT NOTE ADULT - SUBJECTIVE AND OBJECTIVE BOX
Vascular & Interventional Radiology Brief Consult Note    Evaluate for Procedure: Left PCN and abscess drainage    HPI: 65y M with left septic proximal ureteral stone and renal abscess. Patient reportedly not on anticoagulants and is NPO since midnight.     Allergies:   Medications (Abx/Cardiac/Anticoagulation/Blood Products)  cefTRIAXone   IVPB: 100 mL/Hr IV Intermittent (05-22 @ 20:27)    Data:  180.3  104.3  T(C): 36.6  HR: 81  BP: 143/78  RR: 18  SpO2: 95%    -WBC 14.42 / HgB 12.8 / Hct 38.6 / Plt 279  -Na 133 / Cl 95 / BUN 16 / Glucose 230  -K 3.2 / CO2 20 / Cr 0.88  -ALT 81 / Alk Phos 126 / T.Bili 1.0    Imaging: Left hydronephrosis from proximal ureteral stone and renal abscess.     Assessment/Plan:   -65y M with left hydronephrosis from proximal ureteral stone and renal abscess.  -Approved for left PCN and renal abscess drainage today.  -Place IR procedure order under Dr. Berman.  -F/u labs from this morning.  Vascular & Interventional Radiology Brief Consult Note    Evaluate for Procedure: Left PCN and abscess drainage    HPI: 65y M with left septic proximal ureteral stone and renal abscess. Patient reportedly not on anticoagulants and is NPO since midnight.     Allergies:   Medications (Abx/Cardiac/Anticoagulation/Blood Products)  cefTRIAXone   IVPB: 100 mL/Hr IV Intermittent (05-22 @ 20:27)    Data:  180.3  104.3  T(C): 36.6  HR: 81  BP: 143/78  RR: 18  SpO2: 95%    -WBC 14.42 / HgB 12.8 / Hct 38.6 / Plt 279  -Na 133 / Cl 95 / BUN 16 / Glucose 230  -K 3.2 / CO2 20 / Cr 0.88  -ALT 81 / Alk Phos 126 / T.Bili 1.0    Imaging: Left hydronephrosis from proximal ureteral stone and renal abscess.     Assessment/Plan:   -65y M with left hydronephrosis from proximal ureteral stone and renal abscess.  -Approved for left PCN and renal abscess drainage today.   -Place IR procedure order under Dr. Berman.  -F/u labs from this morning.

## 2021-05-23 NOTE — H&P ADULT - PROBLEM SELECTOR PLAN 1
Found on CT imaging, two stones, obstructing, hx of nephrolithiasis   -IR to take patient for Left PCN and abscess drainage  - I and O   - monitor output from nephrostomy tube

## 2021-05-23 NOTE — H&P ADULT - PROBLEM SELECTOR PLAN 4
Found on CT, patient with left upper quad pain for the last 6 months  - will start AC (heparin drip) after imaging   - monitor pain   - monitor platelets (270)

## 2021-05-23 NOTE — H&P ADULT - PROBLEM SELECTOR PLAN 3
Incidental finding of pancreatic mass on Ct, in setting of weight loss   - MRCP  - GI consult  - Will likely need tissue biopsy  - send out cancer tumor markers  - CT chest with contrast and head without contrast to evaluate for workup

## 2021-05-23 NOTE — CHART NOTE - NSCHARTNOTEFT_GEN_A_CORE
Notified that patient ate this morning despite discussion to remain NPO. Urology would recommend patient  to proceed with emergent left percutaneous nephrostomy tube given high risk, likely to become floridly septic from left 2cm stone with associated severe hydronephrosis and left renal abscess. Notified that patient ate this morning despite discussion to remain NPO. Urology would recommend to proceed with emergent left percutaneous nephrostomy tube given high risk, likely to become floridly septic from left 2cm stone with associated severe hydronephrosis and left renal abscess.

## 2021-05-23 NOTE — CONSULT NOTE ADULT - ASSESSMENT
65 year old male with pmhx of DM on metformin and nephrolithiasis (urologist in [place]) presents to ED with left lower quadrant pain x 20 days, worsened 1 week ago and decreased urinary output found to have an obstructing stone causing L hydronephrosis taken for urgent NT placement with Ir and likely pyelonephritis started on zosyn with finding of pancreatic mass on CT.     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  Please contact on-call GI fellow via answering service ([phone]) after 5pm and before 8am, and on weekends.             65 year old male with pmhx of DM on metformin and nephrolithiasis (urologist in [place]) presents to ED with left lower quadrant pain x 20 days, worsened 1 week ago and decreased urinary output found to have an obstructing stone causing L hydronephrosis taken for urgent NT placement with Ir and likely pyelonephritis started on zosyn with finding of pancreatic mass on CT.     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  Please contact on-call GI fellow via answering service ([phone]) after 5pm and before 8am, and on weekends.

## 2021-05-23 NOTE — PRE PROCEDURE NOTE - PRE PROCEDURE EVALUATION
Interventional Radiology Pre-Procedure Note    This is a 65y M with septic left nephrolithiases and renal abscess presenting for a left sided PCN and abscess drainage. Patient is on IV antibiotics (last dose of zosyn at 11AM). Imaging and labs reviewed, patient is an appropriate candidate for the procedure.     Procedure: Left PCN and left renal abscess drainage    Diagnosis/Indication: Patient is a 65y old  Male who presents with a chief complaint of hydronephrosis (23 May 2021 11:41)      PAST MEDICAL & SURGICAL HISTORY:  Diabetes    Kidney stone on left side         Male    Allergies: No Known Allergies      LABS:  CBC Full  -  ( 23 May 2021 11:30 )  WBC Count : 13.78 K/uL  RBC Count : 4.32 M/uL  Hemoglobin : 11.6 g/dL  Hematocrit : 35.3 %  Platelet Count - Automated : 270 K/uL    05-23    133<L>  |  96  |  11  ----------------------------<  167<H>  3.4<L>   |  23  |  0.76    Ca    8.7      23 May 2021 11:30  Phos  3.2     05-23  Mg     1.8     05-23    TPro  6.6  /  Alb  2.8<L>  /  TBili  0.9  /  DBili  x   /  AST  52<H>  /  ALT  64<H>  /  AlkPhos  107  05-23    PT/INR - ( 23 May 2021 11:30 )   PT: 15.8 sec;   INR: 1.33 ratio         PTT - ( 23 May 2021 11:30 )  PTT:28.8 sec    Plan:  -Left PCN and left renal abscess drainage. Spoke with primary team about having a low threshold for MICU consult as patient may become floridly septic after the procedure.   -Procedure/ risks/ benefits were explained, informed consent obtained from patient, verbalizes understanding.

## 2021-05-23 NOTE — CHART NOTE - NSCHARTNOTEFT_GEN_A_CORE
TO BE COMPLETED WITHIN 6 HOURS OF INITIAL ASSESSMENT:    For use in patients that have 2 sepsis criteria and new organ dysfunction     •	Lactate >2    If patient persistent hypotension (SBP<90) or any lactate >4 then provider evaluation (Physician/PA/NP) within 30 minutes of bolus completion is required.    Vital Signs Last 24 Hrs  T(C): 37.1 (22 May 2021 20:17), Max: 37.4 (22 May 2021 17:06)  T(F): 98.7 (22 May 2021 20:17), Max: 99.3 (22 May 2021 17:06)  HR: 90 (22 May 2021 20:17) (90 - 109)  BP: 122/73 (22 May 2021 20:17) (122/73 - 129/85)  BP(mean): --  RR: 18 (22 May 2021 20:17) (18 - 18)  SpO2: 96% (22 May 2021 20:17) (96% - 96%)  		  LUNGS:  [X  ] Clear bilaterally [  ] Wheeze [  ] Rhonchi [  ] Rales [  ] Crackles; Other:  HEART: [ X ]RRR [  ] No murmur [  ]  Normal S1S2 [  ] Tachycardia;  Other:  CAPILLARY REFILL:  	Fingers: [X  ] less than 2 seconds [  ] more than 2 seconds                                           Toes: [ X ]  less than 2 seconds [  ] more than 2 seconds   PERIPHERAL PULSES:  Radial: [x  ] Palpable  [  ]  non-palpable                                         Dorsalis Pedis: [X  ] Palpable  [  ] non-palpable                                         Posterior Tibial: [  X] Palpable  [  ] non-palpable                                          Other:  SKIN:   [  ]  Diaphoretic  [  ]  Mottling  [  ]  Cold extremities  [  ]  Warm [ X ]  Dry                      Other:    BEDSIDE ULTRASOUND FINDINGS (IF APPLICABLE):    Labs:  22 May 2021 19:13    133    |  95     |  16     ----------------------------<  230    3.2     |  20     |  0.88     Ca    9.5        22 May 2021 19:13    TPro  7.2    /  Alb  3.1    /  TBili  1.0    /  DBili  x      /  AST  76     /  ALT  81     /  AlkPhos  126    22 May 2021 19:13                          12.8   14.42 )-----------( 279      ( 22 May 2021 19:13 )             38.6       Lactate:    [X] I have re-evaluated the patient's fluid status and reviewed vital signs. Clinical evaluation demonstrates an appropriate response to fluid resuscitation, with subsequent plan as follows:    Patient received a modified total of fluid resuscitation for the following reason:  [ ] obesity BMI > 30, patient received 30 cc/kg according to Ideal Body Weight   [ ] acute, decompensated CHF   [ ] pulmonary edema    [ ] ESRD with signs of fluid overload  [ ] presence of LVAD     Plan (orders must be placed in EMR):     [  ]  Check Repeat Lactate   [X  ]  No change in current plan  [  ]  Start Vasopressors:  [  ]  Repeat Fluid Bolus:  [  ] other:    Care Discussed with Consultants/Other Providers [ ] YES  [ ] NO

## 2021-05-23 NOTE — H&P ADULT - NSHPPHYSICALEXAM_GEN_ALL_CORE
PHYSICAL EXAM:  T(C): 36.6 (05-23-21 @ 04:39), Max: 37.4 (05-22-21 @ 17:06)  HR: 81 (05-23-21 @ 04:39) (81 - 109)  BP: 143/78 (05-23-21 @ 04:39) (122/73 - 143/78)  RR: 18 (05-23-21 @ 04:39) (18 - 18)  SpO2: 95% (05-23-21 @ 04:39) (95% - 96%)  GENERAL: NAD, well-developed  HEAD:  Atraumatic, Normocephalic  EYES: EOMI, PERRLA, conjunctiva and sclera clear  NECK: Supple, No JVD  CHEST/LUNG: Clear to auscultation bilaterally; No wheeze  HEART: Regular rate and rhythm; No murmurs, rubs, or gallops  ABDOMEN: Soft, tender in left upper quadrant, Nondistended; Bowel sounds present  EXTREMITIES:  2+ Peripheral Pulses, No clubbing, cyanosis, or edema  PSYCH: AAOx3  NEUROLOGY: non-focal  SKIN: No rashes or lesions

## 2021-05-23 NOTE — H&P ADULT - ASSESSMENT
65 year old male with pmhx of DM on metformin and nephrolithiasis (urologist in [place]) presents to ED with left lower quadrant pain x 20 days, worsened 1 week ago and decreased urinary output found to have an obstructing stone causing L hydronephrosis taken for urgent NT placement with Ir and likely pyelonephritis started on zosyn awaiting UCx.

## 2021-05-23 NOTE — H&P ADULT - ATTENDING COMMENTS
65M with hx of nephrolithiasis, DM presents with decreased urine output and L flank pain. (+) poor appetite, 20lb weight loss, hematuria, fever at home. VS: , UA grossly positive, WBC 14.4, Lactate 3. CTAP with 2cm L proximal ureteral stone with severe hydro and pancreatic mass with splenic vein thrombosis. Espinoza was placed in ED, draining 100cc yellow urine    # severe L hydro 2/2 ureteral stone  # pancreatic mass  # splenic vein thrombosis  # sepsis 2/2 UTI/pyelonephritis  # DM II    - s/p aggressive IVF resuscitation and ceftriaxone for sepsis; lactate trending down  - continue IV zosyn for now; fu Bcx, Ucx  - appreciate urology recs: no surgical intervention at this time  - IR consult appreciated: plan for left PCN and renal abscess drainage today  - obtain MRCP for better characterization of pancreatic mass; check tumor markers  - GI consult for possible ERCP/EUS  - obtain CTH to rule out brain mass before starting heparin gtt for splenic vein thrombosis for possible procedure  - obtain CT chest with IV contrast for staging  - monitor urine output  - pain control    Care discussed with Dr. Yuan Albert MD  Division of Hospital Medicine  Cell: [phone]  Office: [phone]

## 2021-05-23 NOTE — H&P ADULT - HISTORY OF PRESENT ILLNESS
65 year old male with pmhx of DM on metformin and nephrolithiasis (urologist in [place]) presents to ED with left lower quadrant pain x 20 days, worsened 1 week ago and decreased urinary output. Pt endorses mild constant left lower quadrant pain without radiation, unrelieved with Tylenol accompanied with weakness, fatigue, decreased appetite with vomiting and nausea however states has been able to keep food down for the past few days, 26 pound weight loss in past 1-2  month, dysuria x 1 week and hematuria 1 week ago which has resolved now. Pt also endorsing fever at home 4 days ago with highest temp of 102. Pt last saw urologist a few months ago and had US which was benign, pt with history of several stones in left kidney and spontaneously passed all the stones without intervention, last time he passed a stone was 4 months ago. Denies flank pain, chest pain, SOB. Patient also notes some pain in his upper left quardant of abdomen. he states that it started about 6 months ago and that he feels it at night. the patient denies n/v/d/c, blood in stool, changes in bowel habits, stool. He denies changes in diet but states that he fasted for Ramadan the last month which is why he believes he lost so much weight.   65 year old male with pmhx of DM on metformin and nephrolithiasis (urologist in [place]) presents to ED with left lower quadrant pain x 20 days, worsened 1 week ago and decreased urinary output. Pt endorses mild constant left lower quadrant pain without radiation, unrelieved with Tylenol accompanied with weakness, fatigue, decreased appetite with vomiting and nausea however states has been able to keep food down for the past few days, 26 pound weight loss in past 1-2  month, dysuria x 1 week and hematuria 1 week ago which has resolved now. Pt also endorsing fever at home 4 days ago with highest temp of 102. Pt last saw urologist a few months ago and had US which was benign, pt with history of several stones in left kidney and spontaneously passed all the stones without intervention, last time he passed a stone was 4 months ago. Denies flank pain, chest pain, SOB. Patient also notes some pain in his upper left quardant of abdomen. he states that it started about 6 months ago and that he feels it at night. the patient denies n/v/d/c, blood in stool, changes in bowel habits, stool. He denies changes in diet but states that he fasted for Ramadan the last month which is why he believes he lost so much weight.

## 2021-05-24 LAB
A1C WITH ESTIMATED AVERAGE GLUCOSE RESULT: 7.9 % — HIGH (ref 4–5.6)
ALBUMIN SERPL ELPH-MCNC: 2.6 G/DL — LOW (ref 3.3–5)
ALP SERPL-CCNC: 106 U/L — SIGNIFICANT CHANGE UP (ref 40–120)
ALT FLD-CCNC: 67 U/L — HIGH (ref 10–45)
AMYLASE P1 CFR SERPL: 34 U/L — SIGNIFICANT CHANGE UP (ref 25–125)
ANION GAP SERPL CALC-SCNC: 14 MMOL/L — SIGNIFICANT CHANGE UP (ref 5–17)
APTT BLD: 45.5 SEC — HIGH (ref 27.5–35.5)
AST SERPL-CCNC: 50 U/L — HIGH (ref 10–40)
BASOPHILS # BLD AUTO: 0.04 K/UL — SIGNIFICANT CHANGE UP (ref 0–0.2)
BASOPHILS NFR BLD AUTO: 0.4 % — SIGNIFICANT CHANGE UP (ref 0–2)
BILIRUB SERPL-MCNC: 0.8 MG/DL — SIGNIFICANT CHANGE UP (ref 0.2–1.2)
BUN SERPL-MCNC: 10 MG/DL — SIGNIFICANT CHANGE UP (ref 7–23)
CALCIUM SERPL-MCNC: 8.8 MG/DL — SIGNIFICANT CHANGE UP (ref 8.4–10.5)
CANCER AG125 SERPL-ACNC: 37 U/ML — SIGNIFICANT CHANGE UP
CANCER AG19-9 SERPL-ACNC: 1577 U/ML — HIGH
CEA SERPL-MCNC: 37.7 NG/ML — HIGH (ref 0–3.8)
CHLORIDE SERPL-SCNC: 97 MMOL/L — SIGNIFICANT CHANGE UP (ref 96–108)
CHOLEST SERPL-MCNC: 137 MG/DL — SIGNIFICANT CHANGE UP
CO2 SERPL-SCNC: 25 MMOL/L — SIGNIFICANT CHANGE UP (ref 22–31)
COVID-19 SPIKE DOMAIN AB INTERP: NEGATIVE — SIGNIFICANT CHANGE UP
COVID-19 SPIKE DOMAIN ANTIBODY RESULT: 0.4 U/ML — SIGNIFICANT CHANGE UP
CREAT SERPL-MCNC: 0.89 MG/DL — SIGNIFICANT CHANGE UP (ref 0.5–1.3)
EOSINOPHIL # BLD AUTO: 0.23 K/UL — SIGNIFICANT CHANGE UP (ref 0–0.5)
EOSINOPHIL NFR BLD AUTO: 2.3 % — SIGNIFICANT CHANGE UP (ref 0–6)
ESTIMATED AVERAGE GLUCOSE: 180 MG/DL — HIGH (ref 68–114)
GLUCOSE BLDC GLUCOMTR-MCNC: 211 MG/DL — HIGH (ref 70–99)
GLUCOSE SERPL-MCNC: 184 MG/DL — HIGH (ref 70–99)
HCT VFR BLD CALC: 34.4 % — LOW (ref 39–50)
HCT VFR BLD CALC: 37.5 % — LOW (ref 39–50)
HCV AB S/CO SERPL IA: 0.16 S/CO — SIGNIFICANT CHANGE UP (ref 0–0.99)
HCV AB SERPL-IMP: SIGNIFICANT CHANGE UP
HDLC SERPL-MCNC: 19 MG/DL — LOW
HGB BLD-MCNC: 11.5 G/DL — LOW (ref 13–17)
HGB BLD-MCNC: 12.3 G/DL — LOW (ref 13–17)
IMM GRANULOCYTES NFR BLD AUTO: 6.6 % — HIGH (ref 0–1.5)
LIDOCAIN IGE QN: 26 U/L — SIGNIFICANT CHANGE UP (ref 7–60)
LIPID PNL WITH DIRECT LDL SERPL: 85 MG/DL — SIGNIFICANT CHANGE UP
LYMPHOCYTES # BLD AUTO: 1.32 K/UL — SIGNIFICANT CHANGE UP (ref 1–3.3)
LYMPHOCYTES # BLD AUTO: 13.5 % — SIGNIFICANT CHANGE UP (ref 13–44)
MAGNESIUM SERPL-MCNC: 1.8 MG/DL — SIGNIFICANT CHANGE UP (ref 1.6–2.6)
MCHC RBC-ENTMCNC: 27 PG — SIGNIFICANT CHANGE UP (ref 27–34)
MCHC RBC-ENTMCNC: 27.9 PG — SIGNIFICANT CHANGE UP (ref 27–34)
MCHC RBC-ENTMCNC: 32.8 GM/DL — SIGNIFICANT CHANGE UP (ref 32–36)
MCHC RBC-ENTMCNC: 33.4 GM/DL — SIGNIFICANT CHANGE UP (ref 32–36)
MCV RBC AUTO: 82.2 FL — SIGNIFICANT CHANGE UP (ref 80–100)
MCV RBC AUTO: 83.5 FL — SIGNIFICANT CHANGE UP (ref 80–100)
MONOCYTES # BLD AUTO: 0.75 K/UL — SIGNIFICANT CHANGE UP (ref 0–0.9)
MONOCYTES NFR BLD AUTO: 7.7 % — SIGNIFICANT CHANGE UP (ref 2–14)
NEUTROPHILS # BLD AUTO: 6.8 K/UL — SIGNIFICANT CHANGE UP (ref 1.8–7.4)
NEUTROPHILS NFR BLD AUTO: 69.5 % — SIGNIFICANT CHANGE UP (ref 43–77)
NON HDL CHOLESTEROL: 118 MG/DL — SIGNIFICANT CHANGE UP
NRBC # BLD: 0 /100 WBCS — SIGNIFICANT CHANGE UP (ref 0–0)
NRBC # BLD: 0 /100 WBCS — SIGNIFICANT CHANGE UP (ref 0–0)
PHOSPHATE SERPL-MCNC: 3.4 MG/DL — SIGNIFICANT CHANGE UP (ref 2.5–4.5)
PLATELET # BLD AUTO: 236 K/UL — SIGNIFICANT CHANGE UP (ref 150–400)
PLATELET # BLD AUTO: 246 K/UL — SIGNIFICANT CHANGE UP (ref 150–400)
POTASSIUM SERPL-MCNC: 3.2 MMOL/L — LOW (ref 3.5–5.3)
POTASSIUM SERPL-SCNC: 3.2 MMOL/L — LOW (ref 3.5–5.3)
PROT SERPL-MCNC: 6.4 G/DL — SIGNIFICANT CHANGE UP (ref 6–8.3)
RBC # BLD: 4.12 M/UL — LOW (ref 4.2–5.8)
RBC # BLD: 4.56 M/UL — SIGNIFICANT CHANGE UP (ref 4.2–5.8)
RBC # FLD: 14.6 % — HIGH (ref 10.3–14.5)
RBC # FLD: 14.6 % — HIGH (ref 10.3–14.5)
SARS-COV-2 IGG+IGM SERPL QL IA: 0.4 U/ML — SIGNIFICANT CHANGE UP
SARS-COV-2 IGG+IGM SERPL QL IA: NEGATIVE — SIGNIFICANT CHANGE UP
SODIUM SERPL-SCNC: 136 MMOL/L — SIGNIFICANT CHANGE UP (ref 135–145)
TRIGL SERPL-MCNC: 164 MG/DL — HIGH
WBC # BLD: 11.11 K/UL — HIGH (ref 3.8–10.5)
WBC # BLD: 9.79 K/UL — SIGNIFICANT CHANGE UP (ref 3.8–10.5)
WBC # FLD AUTO: 11.11 K/UL — HIGH (ref 3.8–10.5)
WBC # FLD AUTO: 9.79 K/UL — SIGNIFICANT CHANGE UP (ref 3.8–10.5)

## 2021-05-24 PROCEDURE — 74183 MRI ABD W/O CNTR FLWD CNTR: CPT | Mod: 26

## 2021-05-24 PROCEDURE — 70450 CT HEAD/BRAIN W/O DYE: CPT | Mod: 26

## 2021-05-24 PROCEDURE — 99233 SBSQ HOSP IP/OBS HIGH 50: CPT | Mod: GC

## 2021-05-24 PROCEDURE — 71260 CT THORAX DX C+: CPT | Mod: 26

## 2021-05-24 PROCEDURE — 99222 1ST HOSP IP/OBS MODERATE 55: CPT | Mod: GC

## 2021-05-24 RX ORDER — HEPARIN SODIUM 5000 [USP'U]/ML
INJECTION INTRAVENOUS; SUBCUTANEOUS
Qty: 25000 | Refills: 0 | Status: DISCONTINUED | OUTPATIENT
Start: 2021-05-24 | End: 2021-05-26

## 2021-05-24 RX ORDER — VANCOMYCIN HCL 1 G
VIAL (EA) INTRAVENOUS
Refills: 0 | Status: DISCONTINUED | OUTPATIENT
Start: 2021-05-24 | End: 2021-05-24

## 2021-05-24 RX ORDER — VANCOMYCIN HCL 1 G
1500 VIAL (EA) INTRAVENOUS EVERY 12 HOURS
Refills: 0 | Status: DISCONTINUED | OUTPATIENT
Start: 2021-05-25 | End: 2021-05-25

## 2021-05-24 RX ORDER — VANCOMYCIN HCL 1 G
VIAL (EA) INTRAVENOUS
Refills: 0 | Status: DISCONTINUED | OUTPATIENT
Start: 2021-05-24 | End: 2021-05-25

## 2021-05-24 RX ORDER — POLYETHYLENE GLYCOL 3350 17 G/17G
17 POWDER, FOR SOLUTION ORAL DAILY
Refills: 0 | Status: DISCONTINUED | OUTPATIENT
Start: 2021-05-24 | End: 2021-05-28

## 2021-05-24 RX ORDER — VANCOMYCIN HCL 1 G
1500 VIAL (EA) INTRAVENOUS ONCE
Refills: 0 | Status: COMPLETED | OUTPATIENT
Start: 2021-05-24 | End: 2021-05-24

## 2021-05-24 RX ADMIN — PIPERACILLIN AND TAZOBACTAM 25 GRAM(S): 4; .5 INJECTION, POWDER, LYOPHILIZED, FOR SOLUTION INTRAVENOUS at 21:55

## 2021-05-24 RX ADMIN — POLYETHYLENE GLYCOL 3350 17 GRAM(S): 17 POWDER, FOR SOLUTION ORAL at 17:37

## 2021-05-24 RX ADMIN — HEPARIN SODIUM 2000 UNIT(S)/HR: 5000 INJECTION INTRAVENOUS; SUBCUTANEOUS at 20:33

## 2021-05-24 RX ADMIN — HEPARIN SODIUM 1800 UNIT(S)/HR: 5000 INJECTION INTRAVENOUS; SUBCUTANEOUS at 12:47

## 2021-05-24 RX ADMIN — Medication 300 MILLIGRAM(S): at 18:15

## 2021-05-24 RX ADMIN — PIPERACILLIN AND TAZOBACTAM 25 GRAM(S): 4; .5 INJECTION, POWDER, LYOPHILIZED, FOR SOLUTION INTRAVENOUS at 12:48

## 2021-05-24 RX ADMIN — PIPERACILLIN AND TAZOBACTAM 25 GRAM(S): 4; .5 INJECTION, POWDER, LYOPHILIZED, FOR SOLUTION INTRAVENOUS at 02:31

## 2021-05-24 RX ADMIN — Medication 1: at 08:59

## 2021-05-24 RX ADMIN — Medication 1: at 17:36

## 2021-05-24 NOTE — PROGRESS NOTE ADULT - ASSESSMENT
65 year old male with pmhx of DM on metformin and nephrolithiasis (urologist in [place]) presents to ED with left lower quadrant pain x 20 days, worsened 1 week ago and decreased urinary output found to have an obstructing stone causing L hydronephrosis taken for urgent NT placement with Ir and likely pyelonephritis started on zosyn awaiting UCx.

## 2021-05-24 NOTE — PROGRESS NOTE ADULT - PROBLEM SELECTOR PLAN 2
Meets SIRs criteria, tachy with elevated WBC, given 2-3L fluid in ED  - lactate went from 3--> 1.5   - given ceftriaxone   - UA positive for WBC, LE  - c/w zosyn   - f/u UCx   - f/u BCx Meets SIRs criteria, tachy with elevated WBC, given 2-3L fluid in ED  - lactate went from 3--> 1.5   - given ceftriaxone in ED   - UA positive for WBC, LE  - c/w zosyn   - f/u UCx   - f/u BCx, one culture growing gram +, likely contaminant, will resend if necessary

## 2021-05-24 NOTE — PROGRESS NOTE ADULT - ATTENDING COMMENTS
discussed above plan with resident on rounds. patient seen and examined. no acute complaints.   labs and imaging reviewed.  I agree with the above findings, assessment, and plan with the following additions and exceptions:  65M with hx of nephrolithiasis, DM presents with decreased urine output and L flank pain. (+) poor appetite, 20lb weight loss, hematuria, fever at home. VS: , UA grossly positive, WBC 14.4, Lactate 3. CTAP with 2cm L proximal ureteral stone with severe hydro and pancreatic mass with splenic vein thrombosis.    # sepsis due to UTI/pyelonephritis found to have severe L hydro 2/2 ureteral stone s/p NT by IR, c/w zosyn. UCx with staph aureus - start vancomycin. bcx 1/4 bottles CNS - suspect contaminant   repeat bcx   ID consult  Urology f/u      # pancreatic mass - CEA, Ca19-9 elevated - GI following. 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 monitor FS - ISS for coverage

## 2021-05-24 NOTE — PROGRESS NOTE ADULT - SUBJECTIVE AND OBJECTIVE BOX
Interventional Radiology Follow-Up Note    This is a 65y Male s/p left nephrostomy placement on 5/23 in Interventional Radiology with Dr. Berman.     S:     Medication:   cefTRIAXone   IVPB: (05-22)  heparin  Infusion.: (05-24)  piperacillin/tazobactam IVPB.: (05-23)  piperacillin/tazobactam IVPB..: (05-24)    Vitals:   T(F): 98, Max: 99.1 (20:54)  HR: 83  BP: 139/80  RR: 18  SpO2: 95%    Physical Exam:  General:   Abdomen:   Drain Device: Drain intact attached to ____. Dressing clean, dry, intact.    LABS:  WBC 9.79 / Hgb 11.5 / Hct 34.4 / Plt 236  Na -- / K -- / CO2 -- / Cl -- / BUN -- / Cr -- / Glucose --  ALT -- / AST -- / Alk Phos -- / Tbili --  Ptt -- / Pt -- / INR --      24hr Drain output: 810cc    Assessment/Plan: 65 year old male with pmhx of DM and nephrolithiasis (urologist in [place]) presented with left lower quadrant pain x 20 days, worsened 1 week ago, fever found to have a 2cm Left proximal ureteral stone with severe hydro now s/p left nephrostomy placement on 5/23 in Interventional Radiology with Dr. Berman.     -wbc downtrending 13.78-->9.79  -f/u urine cx  -trend vs/labs  -flush drain with 10cc NS daily forward only; DO NOT aspirate  -change dressing q3 days or when dressing is saturated  -regarding outpatient follow up with IR, if the patient is d/c home with drainage catheter they can make an appointment with IR by calling the IR booking office at [phone]. Patient to f/u with urology for further stone management. IR f/u per urology recs. If drain remains in place long term drain will need to be exchanged in 3 months or sooner if there are any issues.  -they will benefit from VNS service to help with drainage catheter care; they should continue same drainage catheter care as an outpatient.  -continue global management per primary team   -Please call IR at extension [phone] with any questions, concerns, or issues regarding above.      Beth Paredes PA-C  Spectra link #[phone]     Interventional Radiology Follow-Up Note    This is a 65y Male s/p left nephrostomy placement on 5/23 in Interventional Radiology with Dr. Berman.     S: unable to assess patient- patient off unit in MRI    Medication:   cefTRIAXone   IVPB: (05-22)  heparin  Infusion.: (05-24)  piperacillin/tazobactam IVPB.: (05-23)  piperacillin/tazobactam IVPB..: (05-24)    Vitals:   T(F): 98, Max: 99.1 (20:54)  HR: 83  BP: 139/80  RR: 18  SpO2: 95%    Physical Exam: unable to perform due to off unit      LABS:  WBC 9.79 / Hgb 11.5 / Hct 34.4 / Plt 236  Na -- / K -- / CO2 -- / Cl -- / BUN -- / Cr -- / Glucose --  ALT -- / AST -- / Alk Phos -- / Tbili --  Ptt -- / Pt -- / INR --      24hr Drain output: 810cc    Assessment/Plan: 65 year old male with pmhx of DM and nephrolithiasis (urologist in [place]) presented with left lower quadrant pain x 20 days, worsened 1 week ago, fever found to have a 2cm Left proximal ureteral stone with severe hydro now s/p left nephrostomy placement on 5/23 in Interventional Radiology with Dr. Berman.     -wbc downtrending 13.78-->9.79  -f/u urine cx  -trend vs/labs  -flush drain with 10cc NS daily forward only; DO NOT aspirate  -change dressing q3 days or when dressing is saturated  -regarding outpatient follow up with IR, if the patient is d/c home with drainage catheter they can make an appointment with IR by calling the IR booking office at [phone]. Patient to f/u with urology for further stone management. IR f/u per urology recs. If drain remains in place long term drain will need to be exchanged in 3 months or sooner if there are any issues.  -they will benefit from VNS service to help with drainage catheter care; they should continue same drainage catheter care as an outpatient.  -continue global management per primary team   -Please call IR at extension [phone] with any questions, concerns, or issues regarding above.      Beth Paredes PA-C  Spectra link #[phone]

## 2021-05-24 NOTE — PROGRESS NOTE ADULT - PROBLEM SELECTOR PLAN 6
Diet; NPO for procedure now   DVT: will be on full Ac with heparin   Consults: urology, Ir and GI Diet:  regular diet CC  DVT: will be on full AC with heparin   Consults: urology (signed off 5/24), IR and GI

## 2021-05-24 NOTE — PROGRESS NOTE ADULT - PROBLEM SELECTOR PLAN 3
Incidental finding of pancreatic mass on Ct, in setting of weight loss   - MRCP  - GI consult  - Will likely need tissue biopsy  - send out cancer tumor markers  - CT chest with contrast and head without contrast to evaluate for workup Incidental finding of pancreatic mass on Ct, in setting of weight loss   -  benign vs malignant - cyst vs adenocarcinoma vs lymphoma vs NET  - MRCP  - GI recs appreciated   - Will likely need tissue biopsy  - send out cancer tumor markers  - CT chest with contrast and head without contrast to evaluate for workup

## 2021-05-24 NOTE — PROGRESS NOTE ADULT - SUBJECTIVE AND OBJECTIVE BOX
PROGRESS NOTE:   Authored by Dr. Nancy Bolden, MARLA pager [phone]    Patient is a 65y old  Male who presents with a chief complaint of hydronephrosis (23 May 2021 16:57)      SUBJECTIVE / OVERNIGHT EVENTS:    MEDICATIONS  (STANDING):  dextrose 40% Gel 15 Gram(s) Oral once  dextrose 5%. 1000 milliLiter(s) (50 mL/Hr) IV Continuous <Continuous>  dextrose 5%. 1000 milliLiter(s) (100 mL/Hr) IV Continuous <Continuous>  dextrose 50% Injectable 25 Gram(s) IV Push once  dextrose 50% Injectable 12.5 Gram(s) IV Push once  dextrose 50% Injectable 25 Gram(s) IV Push once  glucagon  Injectable 1 milliGRAM(s) IntraMuscular once  insulin lispro (ADMELOG) corrective regimen sliding scale   SubCutaneous three times a day before meals  insulin lispro (ADMELOG) corrective regimen sliding scale   SubCutaneous at bedtime  piperacillin/tazobactam IVPB.. 3.375 Gram(s) IV Intermittent every 8 hours    MEDICATIONS  (PRN):      CAPILLARY BLOOD GLUCOSE      POCT Blood Glucose.: 196 mg/dL (23 May 2021 21:13)  POCT Blood Glucose.: 153 mg/dL (23 May 2021 17:27)    I&O's Summary    23 May 2021 07:01  -  24 May 2021 07:00  --------------------------------------------------------  IN: 100 mL / OUT: 2110 mL / NET: -2010 mL        PHYSICAL EXAM:  Vital Signs Last 24 Hrs  T(C): 36.4 (24 May 2021 05:43), Max: 37.3 (23 May 2021 20:54)  T(F): 97.6 (24 May 2021 05:43), Max: 99.1 (23 May 2021 20:54)  HR: 83 (24 May 2021 05:43) (77 - 89)  BP: 148/78 (24 May 2021 05:43) (133/73 - 159/84)  BP(mean): 108 (23 May 2021 15:45) (91 - 118)  RR: 19 (24 May 2021 05:43) (16 - 20)  SpO2: 96% (24 May 2021 05:43) (93% - 97%)    GENERAL: No acute distress, well-developed  HEAD:  Atraumatic, Normocephalic  EYES: EOMI, PERRLA, conjunctiva and sclera clear  NECK: Supple, no lymphadenopathy, no JVD  CHEST/LUNG: CTAB; No wheezes, rales, or rhonchi  HEART: Regular rate and rhythm; No murmurs, rubs, or gallops  ABDOMEN: Soft, non-tender, non-distended; normal bowel sounds, no organomegaly  EXTREMITIES:  2+ peripheral pulses b/l, No clubbing, cyanosis, or edema  NEUROLOGY: A&O x 3, no focal deficits  SKIN: No rashes or lesions    LABS:                        11.6   13.78 )-----------( 270      ( 23 May 2021 11:30 )             35.3     05-23    133<L>  |  96  |  11  ----------------------------<  167<H>  3.4<L>   |  23  |  0.76    Ca    8.7      23 May 2021 11:30  Phos  3.2     05-23  Mg     1.8     05-23    TPro  6.6  /  Alb  2.8<L>  /  TBili  0.9  /  DBili  x   /  AST  52<H>  /  ALT  64<H>  /  AlkPhos  107  05-23    PT/INR - ( 23 May 2021 11:30 )   PT: 15.8 sec;   INR: 1.33 ratio         PTT - ( 23 May 2021 11:30 )  PTT:28.8 sec      Urinalysis Basic - ( 22 May 2021 21:19 )    Color: Yellow / Appearance: Turbid / SG: >1.050 / pH: x  Gluc: x / Ketone: Negative  / Bili: Negative / Urobili: Negative   Blood: x / Protein: 100 mg/dL / Nitrite: Negative   Leuk Esterase: Large / RBC: 28 /hpf / WBC 3137 /HPF   Sq Epi: x / Non Sq Epi: 1 /hpf / Bacteria: Few        Culture - Blood (collected 23 May 2021 01:34)  Source: .Blood Blood-Peripheral  Preliminary Report (24 May 2021 02:01):    No growth to date.    Culture - Blood (collected 23 May 2021 01:34)  Source: .Blood Blood-Peripheral  Gram Stain (23 May 2021 18:31):    Growth in aerobic bottle: Gram Positive Cocci in Clusters  Preliminary Report (23 May 2021 18:31):    Growth in aerobic bottle: Gram Positive Cocci in Clusters    ***Blood Panel PCR results on this specimen are available    approximately 3 hours after the Gram stain result.***    Gram stain, PCR, and/or culture results may not always    correspond due to difference in methodologies.    ************************************************************    This PCR assay was performed by multiplex PCR. This    Assay tests for 66 bacterial and resistance gene targets.    Please refer to the [facility] test directory    at [email or web address] for details.  Organism: Blood Culture PCR (23 May 2021 20:39)  Organism: Blood Culture PCR (23 May 2021 20:39)        RADIOLOGY & ADDITIONAL TESTS:  Results Reviewed:   Imaging Personally Reviewed:  Electrocardiogram Personally Reviewed:    COORDINATION OF CARE:  Care Discussed with Consultants/Other Providers [Y/N]:  Prior or Outpatient Records Reviewed [Y/N]:   PROGRESS NOTE:   Authored by Dr. Nancy Bolden, MARLA pager [phone]    Patient is a 65y old  Male who presents with a chief complaint of hydronephrosis (23 May 2021 16:57)      SUBJECTIVE / OVERNIGHT EVENTS: No events overnight. Patient examined at bedside. he appears well and states that he feels much relief with the nephrostomy tube. He denies any issues with drainage and denies pain. Patient has been able to urinate via rosado and denies n/v/d. He still ha snot had a BM.     MEDICATIONS  (STANDING):  dextrose 40% Gel 15 Gram(s) Oral once  dextrose 5%. 1000 milliLiter(s) (50 mL/Hr) IV Continuous <Continuous>  dextrose 5%. 1000 milliLiter(s) (100 mL/Hr) IV Continuous <Continuous>  dextrose 50% Injectable 25 Gram(s) IV Push once  dextrose 50% Injectable 12.5 Gram(s) IV Push once  dextrose 50% Injectable 25 Gram(s) IV Push once  glucagon  Injectable 1 milliGRAM(s) IntraMuscular once  insulin lispro (ADMELOG) corrective regimen sliding scale   SubCutaneous three times a day before meals  insulin lispro (ADMELOG) corrective regimen sliding scale   SubCutaneous at bedtime  piperacillin/tazobactam IVPB.. 3.375 Gram(s) IV Intermittent every 8 hours    MEDICATIONS  (PRN):      CAPILLARY BLOOD GLUCOSE      POCT Blood Glucose.: 196 mg/dL (23 May 2021 21:13)  POCT Blood Glucose.: 153 mg/dL (23 May 2021 17:27)    I&O's Summary    23 May 2021 07:01  -  24 May 2021 07:00  --------------------------------------------------------  IN: 100 mL / OUT: 2110 mL / NET: -2010 mL        PHYSICAL EXAM:  Vital Signs Last 24 Hrs  T(C): 36.4 (24 May 2021 05:43), Max: 37.3 (23 May 2021 20:54)  T(F): 97.6 (24 May 2021 05:43), Max: 99.1 (23 May 2021 20:54)  HR: 83 (24 May 2021 05:43) (77 - 89)  BP: 148/78 (24 May 2021 05:43) (133/73 - 159/84)  BP(mean): 108 (23 May 2021 15:45) (91 - 118)  RR: 19 (24 May 2021 05:43) (16 - 20)  SpO2: 96% (24 May 2021 05:43) (93% - 97%)    GENERAL: No acute distress, well-developed  HEAD:  Atraumatic, Normocephalic  EYES: EOMI, PERRLA, conjunctiva and sclera clear  NECK: Supple, no lymphadenopathy, no JVD  CHEST/LUNG: CTAB; No wheezes, rales, or rhonchi  HEART: Regular rate and rhythm; No murmurs, rubs, or gallops  ABDOMEN: Soft, non-tender, non-distended; normal bowel sounds, no organomegaly, left nephrostomy tube with good yellow output, no swelling or erythema or leaking around incision site   EXTREMITIES:  2+ peripheral pulses b/l, No clubbing, cyanosis, or edema  NEUROLOGY: A&O x 3, no focal deficits  SKIN: No rashes or lesions    LABS:                        11.6   13.78 )-----------( 270      ( 23 May 2021 11:30 )             35.3     05-23    133<L>  |  96  |  11  ----------------------------<  167<H>  3.4<L>   |  23  |  0.76    Ca    8.7      23 May 2021 11:30  Phos  3.2     05-23  Mg     1.8     05-23    TPro  6.6  /  Alb  2.8<L>  /  TBili  0.9  /  DBili  x   /  AST  52<H>  /  ALT  64<H>  /  AlkPhos  107  05-23    PT/INR - ( 23 May 2021 11:30 )   PT: 15.8 sec;   INR: 1.33 ratio         PTT - ( 23 May 2021 11:30 )  PTT:28.8 sec      Urinalysis Basic - ( 22 May 2021 21:19 )    Color: Yellow / Appearance: Turbid / SG: >1.050 / pH: x  Gluc: x / Ketone: Negative  / Bili: Negative / Urobili: Negative   Blood: x / Protein: 100 mg/dL / Nitrite: Negative   Leuk Esterase: Large / RBC: 28 /hpf / WBC 3137 /HPF   Sq Epi: x / Non Sq Epi: 1 /hpf / Bacteria: Few        Culture - Blood (collected 23 May 2021 01:34)  Source: .Blood Blood-Peripheral  Preliminary Report (24 May 2021 02:01):    No growth to date.    Culture - Blood (collected 23 May 2021 01:34)  Source: .Blood Blood-Peripheral  Gram Stain (23 May 2021 18:31):    Growth in aerobic bottle: Gram Positive Cocci in Clusters  Preliminary Report (23 May 2021 18:31):    Growth in aerobic bottle: Gram Positive Cocci in Clusters    ***Blood Panel PCR results on this specimen are available    approximately 3 hours after the Gram stain result.***    Gram stain, PCR, and/or culture results may not always    correspond due to difference in methodologies.    ************************************************************    This PCR assay was performed by multiplex PCR. This    Assay tests for 66 bacterial and resistance gene targets.    Please refer to the [facility] test directory    at [email or web address] for details.  Organism: Blood Culture PCR (23 May 2021 20:39)  Organism: Blood Culture PCR (23 May 2021 20:39)        RADIOLOGY & ADDITIONAL TESTS:  Results Reviewed:   Imaging Personally Reviewed:  Electrocardiogram Personally Reviewed:    COORDINATION OF CARE:  Care Discussed with Consultants/Other Providers [Y/N]:  Prior or Outpatient Records Reviewed [Y/N]:

## 2021-05-24 NOTE — PROGRESS NOTE ADULT - PROBLEM SELECTOR PLAN 1
Found on CT imaging, two stones, obstructing, hx of nephrolithiasis   -IR to take patient for Left PCN and abscess drainage  - I and O   - monitor output from nephrostomy tube Found on CT imaging, two stones, obstructing, hx of nephrolithiasis   - s/p Left PCN and abscess drainage with IR  - I and O   - monitor output from nephrostomy tube

## 2021-05-24 NOTE — PROGRESS NOTE ADULT - PROBLEM SELECTOR PLAN 4
Found on CT, patient with left upper quad pain for the last 6 months  - will start AC (heparin drip) after imaging   - monitor pain   - monitor platelets (270) Found on CT, patient with left upper quad pain for the last 6 months  - will start full AC (heparin drip) after imaging   - monitor pain   - monitor platelets (270)

## 2021-05-24 NOTE — PROVIDER CONTACT NOTE (OTHER) - SITUATION
pt aptt result is 45.5, dose to be increased to 20ml/hr and 4000 unit bolus to be given if ordered, no prn boluses ordered

## 2021-05-24 NOTE — CHART NOTE - NSCHARTNOTEFT_GEN_A_CORE
Patient is now s/p nephrostomy tube placement and remains afebrile and clinically stable.  Urology will sign off at this time, patient will need follow up with his outpatient urologist after discharge.  Please call with further questions or clinical changes.

## 2021-05-25 DIAGNOSIS — A41.01 SEPSIS DUE TO METHICILLIN SUSCEPTIBLE STAPHYLOCOCCUS AUREUS: ICD-10-CM

## 2021-05-25 DIAGNOSIS — N10 ACUTE PYELONEPHRITIS: ICD-10-CM

## 2021-05-25 DIAGNOSIS — R78.81 BACTEREMIA: ICD-10-CM

## 2021-05-25 DIAGNOSIS — D72.829 ELEVATED WHITE BLOOD CELL COUNT, UNSPECIFIED: ICD-10-CM

## 2021-05-25 LAB
-  AMPICILLIN/SULBACTAM: SIGNIFICANT CHANGE UP
-  CEFAZOLIN: SIGNIFICANT CHANGE UP
-  CLINDAMYCIN: SIGNIFICANT CHANGE UP
-  ERYTHROMYCIN: SIGNIFICANT CHANGE UP
-  GENTAMICIN: SIGNIFICANT CHANGE UP
-  OXACILLIN: SIGNIFICANT CHANGE UP
-  PENICILLIN: SIGNIFICANT CHANGE UP
-  RIFAMPIN: SIGNIFICANT CHANGE UP
-  TETRACYCLINE: SIGNIFICANT CHANGE UP
-  TRIMETHOPRIM/SULFAMETHOXAZOLE: SIGNIFICANT CHANGE UP
-  VANCOMYCIN: SIGNIFICANT CHANGE UP
ALBUMIN SERPL ELPH-MCNC: 3 G/DL — LOW (ref 3.3–5)
ALP SERPL-CCNC: 114 U/L — SIGNIFICANT CHANGE UP (ref 40–120)
ALT FLD-CCNC: 69 U/L — HIGH (ref 10–45)
ANION GAP SERPL CALC-SCNC: 15 MMOL/L — SIGNIFICANT CHANGE UP (ref 5–17)
APTT BLD: 49.5 SEC — HIGH (ref 27.5–35.5)
APTT BLD: 83.9 SEC — HIGH (ref 27.5–35.5)
APTT BLD: 94 SEC — HIGH (ref 27.5–35.5)
AST SERPL-CCNC: 47 U/L — HIGH (ref 10–40)
BASOPHILS # BLD AUTO: 0.08 K/UL — SIGNIFICANT CHANGE UP (ref 0–0.2)
BASOPHILS NFR BLD AUTO: 0.6 % — SIGNIFICANT CHANGE UP (ref 0–2)
BILIRUB SERPL-MCNC: 0.7 MG/DL — SIGNIFICANT CHANGE UP (ref 0.2–1.2)
BUN SERPL-MCNC: 9 MG/DL — SIGNIFICANT CHANGE UP (ref 7–23)
CALCIUM SERPL-MCNC: 8.7 MG/DL — SIGNIFICANT CHANGE UP (ref 8.4–10.5)
CHLORIDE SERPL-SCNC: 95 MMOL/L — LOW (ref 96–108)
CO2 SERPL-SCNC: 23 MMOL/L — SIGNIFICANT CHANGE UP (ref 22–31)
CREAT SERPL-MCNC: 0.9 MG/DL — SIGNIFICANT CHANGE UP (ref 0.5–1.3)
CULTURE RESULTS: SIGNIFICANT CHANGE UP
EOSINOPHIL # BLD AUTO: 0.31 K/UL — SIGNIFICANT CHANGE UP (ref 0–0.5)
EOSINOPHIL NFR BLD AUTO: 2.3 % — SIGNIFICANT CHANGE UP (ref 0–6)
GLUCOSE BLDC GLUCOMTR-MCNC: 128 MG/DL — HIGH (ref 70–99)
GLUCOSE BLDC GLUCOMTR-MCNC: 183 MG/DL — HIGH (ref 70–99)
GLUCOSE BLDC GLUCOMTR-MCNC: 187 MG/DL — HIGH (ref 70–99)
GLUCOSE BLDC GLUCOMTR-MCNC: 256 MG/DL — HIGH (ref 70–99)
GLUCOSE SERPL-MCNC: 248 MG/DL — HIGH (ref 70–99)
HCT VFR BLD CALC: 38.4 % — LOW (ref 39–50)
HGB BLD-MCNC: 12.6 G/DL — LOW (ref 13–17)
IMM GRANULOCYTES NFR BLD AUTO: 7.2 % — HIGH (ref 0–1.5)
LYMPHOCYTES # BLD AUTO: 1.91 K/UL — SIGNIFICANT CHANGE UP (ref 1–3.3)
LYMPHOCYTES # BLD AUTO: 14.2 % — SIGNIFICANT CHANGE UP (ref 13–44)
MAGNESIUM SERPL-MCNC: 1.7 MG/DL — SIGNIFICANT CHANGE UP (ref 1.6–2.6)
MCHC RBC-ENTMCNC: 26.9 PG — LOW (ref 27–34)
MCHC RBC-ENTMCNC: 32.8 GM/DL — SIGNIFICANT CHANGE UP (ref 32–36)
MCV RBC AUTO: 82.1 FL — SIGNIFICANT CHANGE UP (ref 80–100)
METHOD TYPE: SIGNIFICANT CHANGE UP
MONOCYTES # BLD AUTO: 0.71 K/UL — SIGNIFICANT CHANGE UP (ref 0–0.9)
MONOCYTES NFR BLD AUTO: 5.3 % — SIGNIFICANT CHANGE UP (ref 2–14)
MSSA DNA SPEC QL NAA+PROBE: SIGNIFICANT CHANGE UP
NEUTROPHILS # BLD AUTO: 9.43 K/UL — HIGH (ref 1.8–7.4)
NEUTROPHILS NFR BLD AUTO: 70.4 % — SIGNIFICANT CHANGE UP (ref 43–77)
NRBC # BLD: 0 /100 WBCS — SIGNIFICANT CHANGE UP (ref 0–0)
ORGANISM # SPEC MICROSCOPIC CNT: SIGNIFICANT CHANGE UP
PHOSPHATE SERPL-MCNC: 2.5 MG/DL — SIGNIFICANT CHANGE UP (ref 2.5–4.5)
PLATELET # BLD AUTO: 319 K/UL — SIGNIFICANT CHANGE UP (ref 150–400)
POTASSIUM SERPL-MCNC: 3 MMOL/L — LOW (ref 3.5–5.3)
POTASSIUM SERPL-SCNC: 3 MMOL/L — LOW (ref 3.5–5.3)
PROT SERPL-MCNC: 7.3 G/DL — SIGNIFICANT CHANGE UP (ref 6–8.3)
RBC # BLD: 4.68 M/UL — SIGNIFICANT CHANGE UP (ref 4.2–5.8)
RBC # FLD: 14.5 % — SIGNIFICANT CHANGE UP (ref 10.3–14.5)
SODIUM SERPL-SCNC: 133 MMOL/L — LOW (ref 135–145)
SPECIMEN SOURCE: SIGNIFICANT CHANGE UP
WBC # BLD: 13.41 K/UL — HIGH (ref 3.8–10.5)
WBC # FLD AUTO: 13.41 K/UL — HIGH (ref 3.8–10.5)

## 2021-05-25 PROCEDURE — 99232 SBSQ HOSP IP/OBS MODERATE 35: CPT

## 2021-05-25 PROCEDURE — 93306 TTE W/DOPPLER COMPLETE: CPT | Mod: 26

## 2021-05-25 PROCEDURE — 99233 SBSQ HOSP IP/OBS HIGH 50: CPT | Mod: GC

## 2021-05-25 PROCEDURE — 99222 1ST HOSP IP/OBS MODERATE 55: CPT

## 2021-05-25 PROCEDURE — 99232 SBSQ HOSP IP/OBS MODERATE 35: CPT | Mod: GC

## 2021-05-25 RX ORDER — POTASSIUM CHLORIDE 20 MEQ
40 PACKET (EA) ORAL ONCE
Refills: 0 | Status: COMPLETED | OUTPATIENT
Start: 2021-05-25 | End: 2021-05-25

## 2021-05-25 RX ORDER — SODIUM,POTASSIUM PHOSPHATES 278-250MG
1 POWDER IN PACKET (EA) ORAL ONCE
Refills: 0 | Status: COMPLETED | OUTPATIENT
Start: 2021-05-25 | End: 2021-05-25

## 2021-05-25 RX ORDER — CEFAZOLIN SODIUM 1 G
2000 VIAL (EA) INJECTION EVERY 8 HOURS
Refills: 0 | Status: DISCONTINUED | OUTPATIENT
Start: 2021-05-25 | End: 2021-05-28

## 2021-05-25 RX ADMIN — Medication 100 MILLIGRAM(S): at 13:05

## 2021-05-25 RX ADMIN — Medication 100 MILLIGRAM(S): at 21:34

## 2021-05-25 RX ADMIN — PIPERACILLIN AND TAZOBACTAM 25 GRAM(S): 4; .5 INJECTION, POWDER, LYOPHILIZED, FOR SOLUTION INTRAVENOUS at 02:44

## 2021-05-25 RX ADMIN — HEPARIN SODIUM 2200 UNIT(S)/HR: 5000 INJECTION INTRAVENOUS; SUBCUTANEOUS at 18:05

## 2021-05-25 RX ADMIN — Medication 40 MILLIEQUIVALENT(S): at 19:32

## 2021-05-25 RX ADMIN — Medication 300 MILLIGRAM(S): at 06:54

## 2021-05-25 RX ADMIN — HEPARIN SODIUM 2200 UNIT(S)/HR: 5000 INJECTION INTRAVENOUS; SUBCUTANEOUS at 02:49

## 2021-05-25 RX ADMIN — Medication 1 PACKET(S): at 19:32

## 2021-05-25 RX ADMIN — HEPARIN SODIUM 2200 UNIT(S)/HR: 5000 INJECTION INTRAVENOUS; SUBCUTANEOUS at 10:47

## 2021-05-25 RX ADMIN — Medication 1: at 13:01

## 2021-05-25 RX ADMIN — Medication 3: at 08:54

## 2021-05-25 NOTE — PROGRESS NOTE ADULT - SUBJECTIVE AND OBJECTIVE BOX
Interventional Radiology Follow-Up Note    This is a 65y Male s/p left nephrostomy placement on 5/23 in Interventional Radiology with Dr. Berman.     S: Patient seen and examined @ bedside ~0715. Hebrew phone  Raza utilized ID #[id number]. Patient denies abdominal pain, fever, chills, n/v.     Medication:   heparin  Infusion.: (05-24)  piperacillin/tazobactam IVPB.: (05-23)  piperacillin/tazobactam IVPB..: (05-25)  vancomycin  IVPB: (05-24)  vancomycin  IVPB: (05-25)    Vitals:  T(F): 98.3, Max: 98.4 (09:00)  HR: 81  BP: 137/90  RR: 18  SpO2: 94%    Physical Exam:  General: Nontoxic, in NAD, A&O x3.  Abdomen: soft, NTND, no peritoneal signs. Left PCN intact.   Drain Device: Drain intact attached to bag with clear yellow slightly pink tinge urine. Dressing clean, dry, intact.    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 Berman.     -Slight increase in wbc 9.79-->11.11, continue to trend  -cx + for staph a.   -abx per team  -trend vs/labs  -flush drain with 10cc NS daily forward only; DO NOT aspirate  -change dressing q3 days or when dressing is saturated  -Urology f/u for stone management.   -regarding outpatient follow up with IR, if the patient is d/c home with drainage catheter they can make an appointment with IR by calling the IR booking office at [phone]. Patient to f/u with urology for further stone management. IR f/u per urology recs. If drain remains in place long term drain will need to be exchanged in 3 months or sooner if there are any issues.  -they will benefit from VNS service to help with drainage catheter care; they should continue same drainage catheter care as an outpatient.  -continue global management per primary team   -Discussed f/u info and drain management at length with patient via Hebrew phone  Raza utilized ID #[id number].   -Will continue to follow.   -Please call IR at extension [phone] with any questions, concerns, or issues regarding above.      Beth Paredes PA-C  Spectra link #[id number]

## 2021-05-25 NOTE — CONSULT NOTE ADULT - SUBJECTIVE AND OBJECTIVE BOX
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 Patient with weight loss of 26 pounds last 1-2 months, without night sweats. 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 Surgical Oncology consulted for possible resection.     PAST MEDICAL & SURGICAL HISTORY:  Diabetes  Kidney stone on left side    FAMILY HISTORY:  Denies family history of cancer    SOCIAL HISTORY:   Denies smoking, EtOH use    ALLERGIES: No Known Allergies    HOME MEDICATIONS:     CURRENT MEDICATIONS  MEDICATIONS (STANDING): ceFAZolin   IVPB 2000 milliGRAM(s) IV Intermittent every 8 hours  dextrose 40% Gel 15 Gram(s) Oral once  dextrose 5%. 1000 milliLiter(s) IV Continuous <Continuous>  dextrose 5%. 1000 milliLiter(s) IV Continuous <Continuous>  dextrose 50% Injectable 25 Gram(s) IV Push once  dextrose 50% Injectable 12.5 Gram(s) IV Push once  dextrose 50% Injectable 25 Gram(s) IV Push once  glucagon  Injectable 1 milliGRAM(s) IntraMuscular once  heparin  Infusion.  Unit(s)/Hr IV Continuous <Continuous>  insulin lispro (ADMELOG) corrective regimen sliding scale   SubCutaneous three times a day before meals  insulin lispro (ADMELOG) corrective regimen sliding scale   SubCutaneous at bedtime  polyethylene glycol 3350 17 Gram(s) Oral daily    MEDICATIONS (PRN):  --------------------------------------------------------------------------------------------    Vitals:   T(C): 36.7 (05-25-21 @ 13:25), Max: 36.8 (05-24-21 @ 18:41)  HR: 81 (05-25-21 @ 13:25) (78 - 88)  BP: 156/88 (05-25-21 @ 13:25) (123/76 - 158/78)  RR: 18 (05-25-21 @ 13:25) (18 - 19)  SpO2: 95% (05-25-21 @ 13:25) (94% - 97%)  CAPILLARY BLOOD GLUCOSE      POCT Blood Glucose.: 187 mg/dL (25 May 2021 12:49)  POCT Blood Glucose.: 256 mg/dL (25 May 2021 08:09)  POCT Blood Glucose.: 211 mg/dL (24 May 2021 21:38)  POCT Blood Glucose.: 163 mg/dL (24 May 2021 17:10)      05-24 @ 07:01  -  05-25 @ 07:00  --------------------------------------------------------  IN:    Heparin Infusion: 108 mL    IV PiggyBack: 100 mL    IV PiggyBack: 500 mL    Oral Fluid: 360 mL  Total IN: 1068 mL    OUT:    Indwelling Catheter - Urethral (mL): 200 mL    Nephrostomy Tube (mL): 1525 mL    Voided (mL): 725 mL  Total OUT: 2450 mL    Total NET: -1382 mL      05-25 @ 07:01  -  05-25 @ 15:10  --------------------------------------------------------  IN:    Oral Fluid: 720 mL  Total IN: 720 mL    OUT:    Nephrostomy Tube (mL): 300 mL  Total OUT: 300 mL    Total NET: 420 mL        Height (cm): 177.8 (05-23 @ 12:04)  Weight (kg): 104.3 (05-23 @ 12:04)  BMI (kg/m2): 33 (05-23 @ 12:04)  BSA (m2): 2.21 (05-23 @ 12:04)      PHYSICAL EXAM:   General: NAD, Lying in bed comfortably  Neuro: A+Ox3  HEENT: NC/AT, EOMI  Cardio: RRR, nml S1/S2  Resp: Good effort, CTA b/l  GI/Abd: Soft, NT/ND, no rebound/guarding, no masses palpated  Musculoskeletal: All 4 extremities moving spontaneously, no limitations  --------------------------------------------------------------------------------------------    LABS  CBC (05-25 @ 10:01)                              12.6<L>                         13.41<H>  )----------------(  319        70.4  % Neutrophils, 14.2  % Lymphocytes, ANC: 9.43<H>                              38.4<L>  CBC (05-24 @ 19:07)                              12.3<L>                         11.11<H>  )----------------(  246        --    % Neutrophils, --    % Lymphocytes, ANC: --                                  37.5<L>    BMP (05-25 @ 10:01)             133<L>  |  95<L>   |  9     		Ca++ --      Ca 8.7                ---------------------------------( 248<H>		Mg 1.7                3.0<L>  |  23      |  0.90  			Ph 2.5     BMP (05-24 @ 07:12)             136     |  97      |  10    		Ca++ --      Ca 8.8                ---------------------------------( 184<H>		Mg 1.8                3.2<L>  |  25      |  0.89  			Ph 3.4       LFTs (05-25 @ 10:01)      TPro 7.3 / Alb 3.0<L> / TBili 0.7 / DBili -- / AST 47<H> / ALT 69<H> / AlkPhos 114  LFTs (05-24 @ 07:12)      TPro 6.4 / Alb 2.6<L> / TBili 0.8 / DBili -- / AST 50<H> / ALT 67<H> / AlkPhos 106    Coags (05-25 @ 10:01)  aPTT 94.0<H> / INR -- / PT --  Coags (05-25 @ 02:20)  aPTT 49.5<H> / INR -- / PT --          --------------------------------------------------------------------------------------------    MICROBIOLOGY  Urinalysis (05-22 @ 21:19):     Color: Yellow / Appearance: Turbid<!> / SG: >1.050<!> / pH: 6.5 / Gluc: Negative / Ketones: Negative / Bili: Negative / Urobili: Negative / Protein :100 mg/dL<!> / Nitrites: Negative / Leuk.Est: Large<!> / RBC: 28<H> / WBC: 3137<H> / Sq Epi:  / Non Sq Epi: 1 / Bacteria Few<!>       -> .Urine Nephrostomy - Left Culture (05-23 @ 18:13)     NG    NG    >100,000 CFU/ml Staphylococcus aureus    -> .Blood Blood-Peripheral Culture (05-23 @ 01:34)       Growth in aerobic bottle: Gram Positive Cocci in Clusters    Blood Culture PCR  Gram Positive Cocci in Clusters    Growth in aerobic bottle: Staphylococcus aureus  Please note change in PCR report:  CNS= not detected  ***Blood Panel PCR results on this specimen are available  approximately 3 hours after the Gram stain result.***  Gram stain, PCR, and/or cultureresults may not always  correspond due to difference in methodologies.  ************************************************************  This PCR assay was performed by multiplex PCR. This  Assay tests for 66 bacterial and resistance gene targets.  Please refer to the [facility] test directory  at [email or web address] for details.    -> .Urine Clean Catch (Midstream) Culture (05-23 @ 00:51)     NG    Staphylococcus aureus    >100,000 CFU/ml Staphylococcus aureus      --------------------------------------------------------------------------------------------    IMAGING  < from: MR MRCP w/wo IV Cont (05.24.21 @ 16:16) >  IMPRESSION:  Infiltrative pancreatic body mass which encases the splenic artery however does not appear to involve the celiac axis, common hepatic artery or SMA. No definite evidence of venous involvement.    Status post interval left percutaneous gastrostomy tube with interval decompression of left hydronephrosis. Collection in the left renal pelvis may represent an abscess or urinoma decreased from recent CT. CT urogram would prove useful for further characterization.      < end of copied text >      --------------------------------------------------------------------------------------------

## 2021-05-25 NOTE — CONSULT NOTE ADULT - ASSESSMENT
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 Bolden (resident)

## 2021-05-25 NOTE — PROGRESS NOTE ADULT - ATTENDING COMMENTS
discussed above plan with resident on rounds. patient seen and examined. no acute complaints. denies abd pain.    labs and imaging reviewed.  I agree with the above findings, assessment, and plan with the following additions and exceptions:  65M with hx of nephrolithiasis, DM presents with decreased urine output and L flank pain. (+) poor appetite, 20lb weight loss, hematuria, fever at home. VS: , UA grossly positive, WBC 14.4, Lactate 3. CTAP with 2cm L proximal ureteral stone with severe hydro and pancreatic mass with splenic vein thrombosis.    # sepsis due to UTI/pyelonephritis found to have severe L hydro 2/2 ureteral stone s/p NT by IR, c/w zosyn. UCx with staph aureus - start vancomycin. bcx 1/4 bottles CNS - suspect contaminant   repeat bcx   ID consult  Urology f/u      # pancreatic mass - CEA, Ca19-9 elevated - GI following. 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 monitor FS - ISS for coverage discussed above plan with resident on rounds. patient seen and examined. no acute complaints. denies abd pain.    labs and imaging reviewed.  I agree with the above findings, assessment, and plan with the following additions and exceptions:  65M with hx of nephrolithiasis, DM presents with decreased urine output and L flank pain. CTAP with 2cm L proximal ureteral stone with severe hydro and pancreatic mass with splenic vein thrombosis.    # sepsis due to UTI/pyelonephritis found to have severe L hydro 2/2 ureteral stone s/p NT by IR, c/w zosyn. UCx with staph aureus - started vancomycin. bcx 1/4 bottles CNS - suspect contaminant   repeat bcx pending  ID consult  Urology f/u      # pancreatic mass - CEA, Ca19-9 elevated and MRCP concerning for malignancy - patient will likely need EUS with biopsy. 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 monitor FS - ISS for coverage discussed above plan with resident on rounds. patient seen and examined. no acute complaints. denies abd pain.    labs and imaging reviewed.  I agree with the above findings, assessment, and plan with the following additions and exceptions:  65M with hx of nephrolithiasis, DM presents with decreased urine output and L flank pain. CTAP with 2cm L proximal ureteral stone with severe hydro and pancreatic mass with splenic vein thrombosis.    # sepsis due to UTI/pyelonephritis found to have severe L hydro 2/2 ureteral stone s/p NT by IR, c/w zosyn. UCx with staph aureus - started vancomycin. bcx 1/4 bottles CNS - suspect contaminant   repeat bcx pending  ID consult  Urology f/u      # pancreatic mass - CEA, Ca19-9 elevated and MRCP concerning for malignancy - patient will likely need EUS with biopsy. 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 monitor FS - ISS for coverage discussed above plan with resident on rounds. patient seen and examined. no acute complaints. denies abd pain.    labs and imaging reviewed.  I agree with the above findings, assessment, and plan with the following additions and exceptions:  65M with hx of nephrolithiasis, DM presents with decreased urine output and L flank pain. CTAP with 2cm L proximal ureteral stone with severe hydro and pancreatic mass with splenic vein thrombosis.    # sepsis due to UTI/pyelonephritis found to have severe L hydro 2/2 ureteral stone s/p NT by IR, c/w zosyn. UCx with staph aureus - started vancomycin. bcx 1/4 bottles CNS - suspect contaminant   repeat bcx pending  ID consult  Urology f/u      # pancreatic mass - CEA, Ca19-9 elevated and MRCP highly suspicious for malignancy - patient will likely need EUS with biopsy. surg/onc consult appreciated - suspect patient will need chemo prior to surgery. 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     # DM II - a1c 7.9. monitor FS - ISS for coverage discussed above plan with resident on rounds. patient seen and examined. no acute complaints. denies abd pain.    labs and imaging reviewed.  I agree with the above findings, assessment, and plan with the following additions and exceptions:  65M with hx of nephrolithiasis, DM presents with decreased urine output and L flank pain. CTAP with 2cm L proximal ureteral stone with severe hydro and pancreatic mass with splenic vein thrombosis.    # sepsis due to UTI/pyelonephritis found to have severe L hydro 2/2 ureteral stone s/p NT by IR, c/w zosyn. UCx with staph aureus (?no previous/recent instrumentation)- started vancomycin. bcx 1/4 bottles CNS - suspect contaminant but now with MSSA bacteremia (1/4 bottles) - will need to continue to monitor bcx   repeat bcx 5/24 NTD  ID consult appreciated - agree with renal us and TTE - abx changed to cefazolin  Urology f/u      # pancreatic mass - CEA, Ca19-9 elevated and MRCP highly suspicious for malignancy - patient will likely need EUS with biopsy. surg/onc consult appreciated - suspect patient will need chemo prior to surgery. 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     # DM II - a1c 7.9. monitor FS - ISS for coverage

## 2021-05-25 NOTE — PROGRESS NOTE ADULT - PROBLEM SELECTOR PLAN 3
Incidental finding of pancreatic mass on Ct, in setting of weight loss   -  benign vs malignant - cyst vs adenocarcinoma vs lymphoma vs NET  - MRCP showing tumor without involvement of vessels but with encasement of splenic artery   - GI recs appreciated   - Will likely need tissue biopsy  -  and CEA elevated   - surg onc consult  - CT chest with contrast and head without contrast to evaluate for workup, found to have adrenal mass, bone islands and left thyroid enlargement--> will send for TSH/free T4 for AM labs tomorrow to further assess

## 2021-05-25 NOTE — PROGRESS NOTE ADULT - ASSESSMENT
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 aureus

## 2021-05-25 NOTE — PROGRESS NOTE ADULT - SUBJECTIVE AND OBJECTIVE BOX
PROGRESS NOTE:   Authored by Dr. Nancy Bolden, MARLA pager [phone]    Patient is a 65y old  Male who presents with a chief complaint of hydronephrosis (25 May 2021 08:48)      SUBJECTIVE / OVERNIGHT EVENTS: patient examined at bedside. He appeared well. I discussed the pancreatic mass findings with both him and his daughter. he understands that this is very possibly cancer and that his tumor markers also came back elevated. Patient was quet without many questions. He was also informed about the thyroid enlargement. He denies issues with swallowing, talking, breathing and denies a hx of thyroid issues in him or family.     MEDICATIONS  (STANDING):  ceFAZolin   IVPB 2000 milliGRAM(s) IV Intermittent every 8 hours  dextrose 40% Gel 15 Gram(s) Oral once  dextrose 5%. 1000 milliLiter(s) (50 mL/Hr) IV Continuous <Continuous>  dextrose 5%. 1000 milliLiter(s) (100 mL/Hr) IV Continuous <Continuous>  dextrose 50% Injectable 25 Gram(s) IV Push once  dextrose 50% Injectable 12.5 Gram(s) IV Push once  dextrose 50% Injectable 25 Gram(s) IV Push once  glucagon  Injectable 1 milliGRAM(s) IntraMuscular once  heparin  Infusion.  Unit(s)/Hr (18 mL/Hr) IV Continuous <Continuous>  insulin lispro (ADMELOG) corrective regimen sliding scale   SubCutaneous three times a day before meals  insulin lispro (ADMELOG) corrective regimen sliding scale   SubCutaneous at bedtime  polyethylene glycol 3350 17 Gram(s) Oral daily    MEDICATIONS  (PRN):      CAPILLARY BLOOD GLUCOSE      POCT Blood Glucose.: 256 mg/dL (25 May 2021 08:09)  POCT Blood Glucose.: 211 mg/dL (24 May 2021 21:38)  POCT Blood Glucose.: 163 mg/dL (24 May 2021 17:10)  POCT Blood Glucose.: 132 mg/dL (24 May 2021 12:45)    I&O's Summary    24 May 2021 07:01  -  25 May 2021 07:00  --------------------------------------------------------  IN: 1068 mL / OUT: 2450 mL / NET: -1382 mL        PHYSICAL EXAM:  Vital Signs Last 24 Hrs  T(C): 36.8 (25 May 2021 09:00), Max: 36.8 (24 May 2021 18:41)  T(F): 98.2 (25 May 2021 09:00), Max: 98.3 (24 May 2021 18:41)  HR: 85 (25 May 2021 09:00) (78 - 88)  BP: 123/76 (25 May 2021 09:00) (123/76 - 158/78)  BP(mean): --  RR: 18 (25 May 2021 09:00) (18 - 19)  SpO2: 94% (25 May 2021 09:00) (94% - 97%)    GENERAL: No acute distress, well-developed  HEAD:  Atraumatic, Normocephalic  EYES: EOMI, PERRLA, conjunctiva and sclera clear  NECK: Supple, no lymphadenopathy, no JVD  CHEST/LUNG: CTAB; No wheezes, rales, or rhonchi  HEART: Regular rate and rhythm; No murmurs, rubs, or gallops  ABDOMEN: Soft, non-tender, non-distended; normal bowel sounds, no organomegaly, left NT with good yellow/ clear output, some blood   EXTREMITIES:  2+ peripheral pulses b/l, No clubbing, cyanosis, or edema  NEUROLOGY: A&O x 3, no focal deficits  SKIN: No rashes or lesions    LABS:                        12.6   13.41 )-----------( 319      ( 25 May 2021 10:01 )             38.4     05-25    133<L>  |  95<L>  |  9   ----------------------------<  248<H>  3.0<L>   |  23  |  0.90    Ca    8.7      25 May 2021 10:01  Phos  2.5     05-25  Mg     1.7     05-25    TPro  7.3  /  Alb  3.0<L>  /  TBili  0.7  /  DBili  x   /  AST  47<H>  /  ALT  69<H>  /  AlkPhos  114  05-25    PTT - ( 25 May 2021 10:01 )  PTT:94.0 sec          Culture - Urine (collected 23 May 2021 18:13)  Source: .Urine Nephrostomy - Left  Preliminary Report (24 May 2021 16:11):    >100,000 CFU/ml Staphylococcus aureus    Culture - Blood (collected 23 May 2021 01:34)  Source: .Blood Blood-Peripheral  Preliminary Report (24 May 2021 02:01):    No growth to date.    Culture - Blood (collected 23 May 2021 01:34)  Source: .Blood Blood-Peripheral  Gram Stain (23 May 2021 18:31):    Growth in aerobic bottle: Gram Positive Cocci in Clusters  Preliminary Report (24 May 2021 19:57):    Growth in aerobic bottle: Staphylococcus aureus    Susceptibility to follow.    ***Blood Panel PCR results on this specimen are available    approximately 3 hours after the Gram stain result.***    Gram stain, PCR, and/or culture results may not always    correspond due to difference in methodologies.    ************************************************************    This PCR assay was performed by multiplex PCR. This    Assay tests for 66 bacterial and resistance gene targets.    Please refer to the [facility] test directory    at [email or web address] for details.  Organism: Blood Culture PCR (23 May 2021 20:39)  Organism: Blood Culture PCR (23 May 2021 20:39)    Culture - Urine (collected 23 May 2021 00:51)  Source: .Urine Clean Catch (Midstream)  Final Report (25 May 2021 10:38):    >100,000 CFU/ml Staphylococcus aureus  Organism: Staphylococcus aureus (25 May 2021 10:38)  Organism: Staphylococcus aureus (25 May 2021 10:38)        RADIOLOGY & ADDITIONAL TESTS:  Results Reviewed:   Imaging Personally Reviewed:  Electrocardiogram Personally Reviewed:    COORDINATION OF CARE:  Care Discussed with Consultants/Other Providers [Y/N]:  Prior or Outpatient Records Reviewed [Y/N]:

## 2021-05-25 NOTE — PROGRESS NOTE ADULT - PROBLEM SELECTOR PLAN 6
Diet:  regular diet CC  DVT: will be on full AC with heparin   Consults: urology (signed off 5/24), IR and GI, surg onc

## 2021-05-25 NOTE — PROGRESS NOTE ADULT - PROBLEM SELECTOR PLAN 1
Found on CT imaging, two stones, obstructing, hx of nephrolithiasis   - s/p Left PCN and abscess drainage with IR  - I and O   - monitor output from nephrostomy tube  - f/u with US to assess status s/p NT

## 2021-05-25 NOTE — CONSULT NOTE ADULT - SUBJECTIVE AND OBJECTIVE BOX
TSERING SCANLON 65y Male  MRN-[id number]    Patient is a 65y old  Male who presents with a chief complaint of hydronephrosis (25 May 2021 11:50)      HPI:   65 year old male with pmhx of DM on metformin and nephrolithiasis (urologist in [place]) presents to ED with left lower quadrant pain x 20 days, worsened 1 week ago and decreased urinary output. Pt endorses mild constant left lower quadrant pain without radiation, unrelieved with Tylenol accompanied with weakness, fatigue, decreased appetite with vomiting and nausea however states has been able to keep food down for the past few days, 26 pound weight loss in past 1-2  month, dysuria x 1 week and hematuria 1 week ago which has resolved now. Pt also endorsing fever at home 4 days ago with highest temp of 102. Pt last saw urologist a few months ago and had US which was benign, pt with history of several stones in left kidney and spontaneously passed all the stones without intervention, last time he passed a stone was 4 months ago. Denies flank pain, chest pain, SOB. Patient also notes some pain in his upper left quardant of abdomen. he states that it started about 6 months ago and that he feels it at night. the patient denies n/v/d/c, blood in stool, changes in bowel habits, stool. He denies changes in diet but states that he fasted for Ramadan the last month which is why he believes he lost so much weight.  (23 May 2021 11:41)    S/p left side NT 5/23    No fever. Feels better     PAST MEDICAL & SURGICAL HISTORY:  Diabetes    Kidney stone on left side        Allergies    No Known Allergies    Intolerances        ANTIMICROBIALS:  ceFAZolin   IVPB 2000 every 8 hours      MEDICATIONS  (STANDING):  ceFAZolin   IVPB 2000 milliGRAM(s) IV Intermittent every 8 hours  dextrose 40% Gel 15 Gram(s) Oral once  dextrose 5%. 1000 milliLiter(s) (50 mL/Hr) IV Continuous <Continuous>  dextrose 5%. 1000 milliLiter(s) (100 mL/Hr) IV Continuous <Continuous>  dextrose 50% Injectable 25 Gram(s) IV Push once  dextrose 50% Injectable 12.5 Gram(s) IV Push once  dextrose 50% Injectable 25 Gram(s) IV Push once  glucagon  Injectable 1 milliGRAM(s) IntraMuscular once  heparin  Infusion.  Unit(s)/Hr (18 mL/Hr) IV Continuous <Continuous>  insulin lispro (ADMELOG) corrective regimen sliding scale   SubCutaneous three times a day before meals  insulin lispro (ADMELOG) corrective regimen sliding scale   SubCutaneous at bedtime  polyethylene glycol 3350 17 Gram(s) Oral daily      Social History  Smoking: no  Etoh: no  Drug use: no      FAMILY HISTORY: No h/o UTI       Vital Signs Last 24 Hrs  T(C): 36.8 (25 May 2021 09:00), Max: 36.8 (24 May 2021 18:41)  T(F): 98.2 (25 May 2021 09:00), Max: 98.3 (24 May 2021 18:41)  HR: 85 (25 May 2021 09:00) (78 - 88)  BP: 123/76 (25 May 2021 09:00) (123/76 - 158/78)  BP(mean): --  RR: 18 (25 May 2021 09:00) (18 - 19)  SpO2: 94% (25 May 2021 09:00) (94% - 97%)    CBC Full  -  ( 25 May 2021 10:01 )  WBC Count : 13.41 K/uL  RBC Count : 4.68 M/uL  Hemoglobin : 12.6 g/dL  Hematocrit : 38.4 %  Platelet Count - Automated : 319 K/uL  Mean Cell Volume : 82.1 fl  Mean Cell Hemoglobin : 26.9 pg  Mean Cell Hemoglobin Concentration : 32.8 gm/dL  Auto Neutrophil # : 9.43 K/uL  Auto Lymphocyte # : 1.91 K/uL  Auto Monocyte # : 0.71 K/uL  Auto Eosinophil # : 0.31 K/uL  Auto Basophil # : 0.08 K/uL  Auto Neutrophil % : 70.4 %  Auto Lymphocyte % : 14.2 %  Auto Monocyte % : 5.3 %  Auto Eosinophil % : 2.3 %  Auto Basophil % : 0.6 %    05-25    133<L>  |  95<L>  |  9   ----------------------------<  248<H>  3.0<L>   |  23  |  0.90    Ca    8.7      25 May 2021 10:01  Phos  2.5     05-25  Mg     1.7     05-25    TPro  7.3  /  Alb  3.0<L>  /  TBili  0.7  /  DBili  x   /  AST  47<H>  /  ALT  69<H>  /  AlkPhos  114  05-25    LIVER FUNCTIONS - ( 25 May 2021 10:01 )  Alb: 3.0 g/dL / Pro: 7.3 g/dL / ALK PHOS: 114 U/L / ALT: 69 U/L / AST: 47 U/L / GGT: x               MICROBIOLOGY:  .Urine Nephrostomy - Left  05-23-21   >100,000 CFU/ml Staphylococcus aureus  --  --      .Blood Blood-Peripheral  05-23-21   Growth in aerobic bottle: Staphylococcus aureus  Susceptibility to follow.  ***Blood Panel PCR results on this specimen are available  approximately 3 hours after the Gram stain result.***  Gram stain, PCR, and/or culture results may not always  correspond due to difference in methodologies.  ************************************************************  This PCR assay was performed by multiplex PCR. This  Assay tests for 66 bacterial and resistance gene targets.  Please refer to the [employer] test directory  at [email or web address] for details.  --  Blood Culture PCR      .Urine Clean Catch (Midstream)  05-23-21   >100,000 CFU/ml Staphylococcus aureus  --  Staphylococcus aureus        RADIOLOGY  rom: CT Chest w/ IV Cont (05.24.21 @ 10:29) >  No lung nodules.  Heterogeneous enlarged left thyroid lobe; if clinically warranted, further evaluation with dedicated thyroid sonography can be performed.  2 tiny sclerotic foci within the upper thoracic spine may represent bone islands. Bone scan can be performed for further evaluation as clinically warranted.     from: MR MRCP w/wo IV Cont (05.24.21 @ 16:16) >  Infiltrative pancreatic body mass which encases the splenic artery however does not appear to involve the celiac axis, common hepatic artery or SMA. No definite evidence of venous involvement.    Status post interval left percutaneous gastrostomy tube with interval decompression of left hydronephrosis. Collection in the left renal pelvis may represent an abscess or urinoma decreased from recent CT. CT urogram would prove useful for further characterization.      < end of copied text >

## 2021-05-25 NOTE — PROGRESS NOTE ADULT - ATTENDING COMMENTS
65M with hydronephrosis and incidental finding of pancreatic body mass.  Will tentatively plan for EUS/FNB tomorrow tentatively. NPO after midnight. Trend CBC, CMP, INR.  Further care per primary team, neprhology/urology.    Thank you for this interesting consult.  Please call the advanced GI service with any questions or concerns.

## 2021-05-25 NOTE — PROGRESS NOTE ADULT - PROBLEM SELECTOR PLAN 4
Found on CT, patient with left upper quad pain for the last 6 months  - c/w full AC (heparin drip)  - monitor pain   - monitor platelets (270)

## 2021-05-25 NOTE — PROGRESS NOTE ADULT - PROBLEM SELECTOR PLAN 2
Meets SIRs criteria, tachy with elevated WBC, given 2-3L fluid in ED  - lactate went from 3--> 1.5   - given ceftriaxone in ED   - UA positive for WBC, LE  - s/p zosyn   - UCx growing s. aureus, no prior instrumentation noted, patient switched to ancf 2g q8 per ID recs   - f/u BCx, one culture growing gram +, likely contaminant

## 2021-05-25 NOTE — PROGRESS NOTE ADULT - SUBJECTIVE AND OBJECTIVE BOX
Chief Complaint:  Patient is a 65y old  Male who presents with a chief complaint of hydronephrosis (25 May 2021 08:48)      Interval Events:   No acute overnight events    Allergies:  No Known Allergies      Hospital Medications:  ceFAZolin   IVPB 2000 milliGRAM(s) IV Intermittent every 8 hours  dextrose 40% Gel 15 Gram(s) Oral once  dextrose 5%. 1000 milliLiter(s) IV Continuous <Continuous>  dextrose 5%. 1000 milliLiter(s) IV Continuous <Continuous>  dextrose 50% Injectable 25 Gram(s) IV Push once  dextrose 50% Injectable 12.5 Gram(s) IV Push once  dextrose 50% Injectable 25 Gram(s) IV Push once  glucagon  Injectable 1 milliGRAM(s) IntraMuscular once  heparin  Infusion.  Unit(s)/Hr IV Continuous <Continuous>  insulin lispro (ADMELOG) corrective regimen sliding scale   SubCutaneous three times a day before meals  insulin lispro (ADMELOG) corrective regimen sliding scale   SubCutaneous at bedtime  polyethylene glycol 3350 17 Gram(s) Oral daily      PMHX/PSHX:  Diabetes    Kidney stone on left side        ROS:   General:  No fevers, chills or night sweats  ENT:  No sore throat or dysphagia  CV:  No pain or palpitations  Resp:  No dyspnea, cough or  wheezing  GI:  No pain, No nausea, No vomiting, No diarrhea, No rectal bleeding, No tarry stools,  Skin:  No rash or edema    PHYSICAL EXAM:   Vital Signs:  Vital Signs Last 24 Hrs  T(C): 36.8 (25 May 2021 09:00), Max: 36.8 (24 May 2021 18:41)  T(F): 98.2 (25 May 2021 09:00), Max: 98.3 (24 May 2021 18:41)  HR: 85 (25 May 2021 09:00) (78 - 88)  BP: 123/76 (25 May 2021 09:00) (123/76 - 158/78)  BP(mean): --  RR: 18 (25 May 2021 09:00) (18 - 19)  SpO2: 94% (25 May 2021 09:00) (94% - 97%)  Daily     Daily     GENERAL:  NAD, Appears stated age  HEENT:  NC/AT,  conjunctivae clear and pink  CHEST:  Normal Effort, Normal rate  HEART:  RRR, S1 + S2  ABDOMEN:  Soft, non-tender, non-distended, BS+  EXTEREMITIES:  no cyanosis  SKIN:  Warm & Dry.  NEURO:  Alert, oriented    LABS:                        12.6   13.41 )-----------( 319      ( 25 May 2021 10:01 )             38.4     Mean Cell Volume: 82.1 fl (05-25-21 @ 10:01)    05-25    133<L>  |  95<L>  |  9   ----------------------------<  248<H>  3.0<L>   |  23  |  0.90    Ca    8.7      25 May 2021 10:01  Phos  2.5     05-25  Mg     1.7     05-25    TPro  7.3  /  Alb  3.0<L>  /  TBili  0.7  /  DBili  x   /  AST  47<H>  /  ALT  69<H>  /  AlkPhos  114  05-25    LIVER FUNCTIONS - ( 25 May 2021 10:01 )  Alb: 3.0 g/dL / Pro: 7.3 g/dL / ALK PHOS: 114 U/L / ALT: 69 U/L / AST: 47 U/L / GGT: x           PTT - ( 25 May 2021 10:01 )  PTT:94.0 sec                            12.6   13.41 )-----------( 319      ( 25 May 2021 10:01 )             38.4                         12.3   11.11 )-----------( 246      ( 24 May 2021 19:07 )             37.5                         11.5   9.79  )-----------( 236      ( 24 May 2021 07:15 )             34.4                         11.6   13.78 )-----------( 270      ( 23 May 2021 11:30 )             35.3                         12.8   14.42 )-----------( 279      ( 22 May 2021 19:13 )             38.6       Imaging:           Chief Complaint:  Patient is a 65y old  Male who presents with a chief complaint of hydronephrosis (25 May 2021 08:48)      Interval Events:   No acute overnight events    Allergies:  No Known Allergies      Hospital Medications:  ceFAZolin   IVPB 2000 milliGRAM(s) IV Intermittent every 8 hours  dextrose 40% Gel 15 Gram(s) Oral once  dextrose 5%. 1000 milliLiter(s) IV Continuous <Continuous>  dextrose 5%. 1000 milliLiter(s) IV Continuous <Continuous>  dextrose 50% Injectable 25 Gram(s) IV Push once  dextrose 50% Injectable 12.5 Gram(s) IV Push once  dextrose 50% Injectable 25 Gram(s) IV Push once  glucagon  Injectable 1 milliGRAM(s) IntraMuscular once  heparin  Infusion.  Unit(s)/Hr IV Continuous <Continuous>  insulin lispro (ADMELOG) corrective regimen sliding scale   SubCutaneous three times a day before meals  insulin lispro (ADMELOG) corrective regimen sliding scale   SubCutaneous at bedtime  polyethylene glycol 3350 17 Gram(s) Oral daily      PMHX/PSHX:  Diabetes    Kidney stone on left side        ROS:   General:  No fevers, chills or night sweats  ENT:  No sore throat or dysphagia  CV:  No pain or palpitations  Resp:  No dyspnea, cough or  wheezing  GI:  No pain, No nausea, No vomiting, No diarrhea, No rectal bleeding, No tarry stools,  Skin:  No rash or edema    PHYSICAL EXAM:   Vital Signs:  Vital Signs Last 24 Hrs  T(C): 36.8 (25 May 2021 09:00), Max: 36.8 (24 May 2021 18:41)  T(F): 98.2 (25 May 2021 09:00), Max: 98.3 (24 May 2021 18:41)  HR: 85 (25 May 2021 09:00) (78 - 88)  BP: 123/76 (25 May 2021 09:00) (123/76 - 158/78)  BP(mean): --  RR: 18 (25 May 2021 09:00) (18 - 19)  SpO2: 94% (25 May 2021 09:00) (94% - 97%)  Daily     Daily     GENERAL:  NAD, Appears stated age  HEENT:  NC/AT,  conjunctivae clear and pink  CHEST:  Normal Effort, Normal rate  HEART:  RRR, S1 + S2  ABDOMEN:  Soft, non-tender, non-distended, BS+  EXTEREMITIES:  no cyanosis  SKIN:  Warm & Dry.  NEURO:  Alert, oriented    LABS:                        12.6   13.41 )-----------( 319      ( 25 May 2021 10:01 )             38.4     Mean Cell Volume: 82.1 fl (05-25-21 @ 10:01)    05-25    133<L>  |  95<L>  |  9   ----------------------------<  248<H>  3.0<L>   |  23  |  0.90    Ca    8.7      25 May 2021 10:01  Phos  2.5     05-25  Mg     1.7     05-25    TPro  7.3  /  Alb  3.0<L>  /  TBili  0.7  /  DBili  x   /  AST  47<H>  /  ALT  69<H>  /  AlkPhos  114  05-25    LIVER FUNCTIONS - ( 25 May 2021 10:01 )  Alb: 3.0 g/dL / Pro: 7.3 g/dL / ALK PHOS: 114 U/L / ALT: 69 U/L / AST: 47 U/L / GGT: x           PTT - ( 25 May 2021 10:01 )  PTT:94.0 sec                            12.6   13.41 )-----------( 319      ( 25 May 2021 10:01 )             38.4                         12.3   11.11 )-----------( 246      ( 24 May 2021 19:07 )             37.5                         11.5   9.79  )-----------( 236      ( 24 May 2021 07:15 )             34.4                         11.6   13.78 )-----------( 270      ( 23 May 2021 11:30 )             35.3                         12.8   14.42 )-----------( 279      ( 22 May 2021 19:13 )             38.6       Imaging:  < from: MR MRCP w/wo IV Cont (05.24.21 @ 16:16) >    EXAM:  MR MRCP WAW IC                            PROCEDURE DATE:  05/24/2021            INTERPRETATION:  CLINICAL INFORMATION: Newly diagnosed pancreatic mass.    COMPARISON: CT 5/24/2021    CONTRAST/COMPLICATIONS:  IV Contrast: Gadavist  10 cc administered   0 cc discarded  Oral Contrast: NONE  Complications: None reported at time of study completion    PROCEDURE:  MRI of the abdomen was performed.  MRCP was performed.    FINDINGS:  LOWER CHEST: Within normal limits.    LIVER: Normal morphology. No focal lesion.  BILE DUCTS: Normal caliber.  GALLBLADDER: Cholelithiasis.  SPLEEN: Splenomegaly.  PANCREAS: Pancreatic body infiltrative mass concerning for pancreatic adenocarcinoma.    Location: Body  Size: 5.6 x 3.8.Series/Image: 8:35  VariantHepatic Arterial Anatomy: YES. Replaced right hepatic artery from the SMA  Arterial Involvement (< 180, > 180): NO. Tumor does not involve the celiac axis, common hepatic artery or SMA however tumor encases the splenic artery.  Venous Involvement (<180, > 180, vessel deformity, vessel occlusion): NO.    ADRENALS: A 1.5 cm left adrenal lesion with questionable focus of microscopic fat is indeterminate  KIDNEYS/URETERS: Interval placement of a left percutaneous nephrostomy tube with interval decompression of left hydronephrosis. Stone within the left renal pelvis better evaluated at recent CT. A fluid collection measuring 4.0 x 2.3 cm in the left renal pelvis may represent an abscess or urinoma. This is decreased from prior CT 5/22/2021. CT urogram would prove useful for further characterization.    VISUALIZED PORTIONS:  BOWEL: Within normal limits.  PERITONEUM: No ascites.  VESSELS: As above.  RETROPERITONEUM/LYMPH NODES: No lymphadenopathy.  ABDOMINAL WALL: Within normal limits.  BONES: Within normal limits.    IMPRESSION:  Infiltrative pancreatic body mass which encases the splenic artery however does not appear to involve the celiac axis, common hepatic artery or SMA. No definite evidence of venous involvement.    Status post interval left percutaneous gastrostomy tube with interval decompression of left hydronephrosis. Collection in the left renal pelvis may represent an abscess or urinoma decreased from recent CT. CT urogram would prove useful for further characterization.      ETHEL VALENZUELA MD; Attending Radiologist  This document has been electronically signed. May 24 2021  5:30PM    < end of copied text >

## 2021-05-25 NOTE — PROGRESS NOTE ADULT - ASSESSMENT
65 year old male with pmhx of DM on metformin and nephrolithiasis (urologist in [place]) presents to ED with left lower quadrant pain x 20 days, worsened 1 week ago and decreased urinary output found to have an obstructing stone causing L hydronephrosis taken for urgent NT placement with Ir and likely pyelonephritis started on zosyn with finding of pancreatic mass on CT.     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

## 2021-05-25 NOTE — CONSULT NOTE ADULT - ASSESSMENT
65 year old male with pmhx of DM on metformin and nephrolithiasis presents to ED with urosepsis secondary to left nephrolithiases s/p IR placement of percutaneous nephrostomy tube, who incidentally found to have pancreatic body mass, CEA 37.7 and CA 19-9 1577, MRCP showing 5 x 4cm pancreatic body mass encasing the splenic artery and left adrenal lesion, concern for pancreatic adenocarcinoma.    PLAN:    - No surgical intervention at this time  - Will follow up GI for eventual EUS/tissue biopsy  - Plan discussed with Attending, Dr. Miles Saavedra MD  General Surgery, PGY 2

## 2021-05-26 ENCOUNTER — RESULT REVIEW (OUTPATIENT)
Age: 66
End: 2021-05-26

## 2021-05-26 DIAGNOSIS — N17.9 ACUTE KIDNEY FAILURE, UNSPECIFIED: ICD-10-CM

## 2021-05-26 LAB
ALBUMIN SERPL ELPH-MCNC: 3.1 G/DL — LOW (ref 3.3–5)
ALP SERPL-CCNC: 107 U/L — SIGNIFICANT CHANGE UP (ref 40–120)
ALT FLD-CCNC: 54 U/L — HIGH (ref 10–45)
ANION GAP SERPL CALC-SCNC: 16 MMOL/L — SIGNIFICANT CHANGE UP (ref 5–17)
ANION GAP SERPL CALC-SCNC: 18 MMOL/L — HIGH (ref 5–17)
ANISOCYTOSIS BLD QL: SLIGHT — SIGNIFICANT CHANGE UP
APTT BLD: 39 SEC — HIGH (ref 27.5–35.5)
AST SERPL-CCNC: 41 U/L — HIGH (ref 10–40)
BASOPHILS # BLD AUTO: 0 K/UL — SIGNIFICANT CHANGE UP (ref 0–0.2)
BASOPHILS NFR BLD AUTO: 0 % — SIGNIFICANT CHANGE UP (ref 0–2)
BILIRUB SERPL-MCNC: 0.6 MG/DL — SIGNIFICANT CHANGE UP (ref 0.2–1.2)
BUN SERPL-MCNC: 10 MG/DL — SIGNIFICANT CHANGE UP (ref 7–23)
BUN SERPL-MCNC: 13 MG/DL — SIGNIFICANT CHANGE UP (ref 7–23)
BURR CELLS BLD QL SMEAR: PRESENT — SIGNIFICANT CHANGE UP
CALCIUM SERPL-MCNC: 8.4 MG/DL — SIGNIFICANT CHANGE UP (ref 8.4–10.5)
CALCIUM SERPL-MCNC: 8.8 MG/DL — SIGNIFICANT CHANGE UP (ref 8.4–10.5)
CHLORIDE SERPL-SCNC: 100 MMOL/L — SIGNIFICANT CHANGE UP (ref 96–108)
CHLORIDE SERPL-SCNC: 96 MMOL/L — SIGNIFICANT CHANGE UP (ref 96–108)
CO2 SERPL-SCNC: 22 MMOL/L — SIGNIFICANT CHANGE UP (ref 22–31)
CO2 SERPL-SCNC: 22 MMOL/L — SIGNIFICANT CHANGE UP (ref 22–31)
CREAT SERPL-MCNC: 1.38 MG/DL — HIGH (ref 0.5–1.3)
CREAT SERPL-MCNC: 1.47 MG/DL — HIGH (ref 0.5–1.3)
DACRYOCYTES BLD QL SMEAR: SLIGHT — SIGNIFICANT CHANGE UP
EOSINOPHIL # BLD AUTO: 0 K/UL — SIGNIFICANT CHANGE UP (ref 0–0.5)
EOSINOPHIL NFR BLD AUTO: 0 % — SIGNIFICANT CHANGE UP (ref 0–6)
GIANT PLATELETS BLD QL SMEAR: PRESENT — SIGNIFICANT CHANGE UP
GLUCOSE BLDC GLUCOMTR-MCNC: 142 MG/DL — HIGH (ref 70–99)
GLUCOSE BLDC GLUCOMTR-MCNC: 148 MG/DL — HIGH (ref 70–99)
GLUCOSE BLDC GLUCOMTR-MCNC: 177 MG/DL — HIGH (ref 70–99)
GLUCOSE BLDC GLUCOMTR-MCNC: 187 MG/DL — HIGH (ref 70–99)
GLUCOSE SERPL-MCNC: 176 MG/DL — HIGH (ref 70–99)
GLUCOSE SERPL-MCNC: 187 MG/DL — HIGH (ref 70–99)
HCT VFR BLD CALC: 37.8 % — LOW (ref 39–50)
HGB BLD-MCNC: 12.6 G/DL — LOW (ref 13–17)
LYMPHOCYTES # BLD AUTO: 1.75 K/UL — SIGNIFICANT CHANGE UP (ref 1–3.3)
LYMPHOCYTES # BLD AUTO: 12.9 % — LOW (ref 13–44)
MACROCYTES BLD QL: SLIGHT — SIGNIFICANT CHANGE UP
MAGNESIUM SERPL-MCNC: 1.6 MG/DL — SIGNIFICANT CHANGE UP (ref 1.6–2.6)
MANUAL SMEAR VERIFICATION: SIGNIFICANT CHANGE UP
MCHC RBC-ENTMCNC: 27.6 PG — SIGNIFICANT CHANGE UP (ref 27–34)
MCHC RBC-ENTMCNC: 33.3 GM/DL — SIGNIFICANT CHANGE UP (ref 32–36)
MCV RBC AUTO: 82.7 FL — SIGNIFICANT CHANGE UP (ref 80–100)
METAMYELOCYTES # FLD: 1.8 % — HIGH (ref 0–0)
MONOCYTES # BLD AUTO: 0.87 K/UL — SIGNIFICANT CHANGE UP (ref 0–0.9)
MONOCYTES NFR BLD AUTO: 6.4 % — SIGNIFICANT CHANGE UP (ref 2–14)
MYELOCYTES NFR BLD: 1.8 % — HIGH (ref 0–0)
NEUTROPHILS # BLD AUTO: 10.45 K/UL — HIGH (ref 1.8–7.4)
NEUTROPHILS NFR BLD AUTO: 75.2 % — SIGNIFICANT CHANGE UP (ref 43–77)
NEUTS BAND # BLD: 1.9 % — SIGNIFICANT CHANGE UP (ref 0–8)
PHOSPHATE SERPL-MCNC: 3.8 MG/DL — SIGNIFICANT CHANGE UP (ref 2.5–4.5)
PLAT MORPH BLD: ABNORMAL
PLATELET # BLD AUTO: 322 K/UL — SIGNIFICANT CHANGE UP (ref 150–400)
POIKILOCYTOSIS BLD QL AUTO: SLIGHT — SIGNIFICANT CHANGE UP
POTASSIUM SERPL-MCNC: 3.1 MMOL/L — LOW (ref 3.5–5.3)
POTASSIUM SERPL-MCNC: 3.3 MMOL/L — LOW (ref 3.5–5.3)
POTASSIUM SERPL-SCNC: 3.1 MMOL/L — LOW (ref 3.5–5.3)
POTASSIUM SERPL-SCNC: 3.3 MMOL/L — LOW (ref 3.5–5.3)
PROT SERPL-MCNC: 7.6 G/DL — SIGNIFICANT CHANGE UP (ref 6–8.3)
RBC # BLD: 4.57 M/UL — SIGNIFICANT CHANGE UP (ref 4.2–5.8)
RBC # FLD: 14.7 % — HIGH (ref 10.3–14.5)
RBC BLD AUTO: ABNORMAL
SODIUM SERPL-SCNC: 136 MMOL/L — SIGNIFICANT CHANGE UP (ref 135–145)
SODIUM SERPL-SCNC: 138 MMOL/L — SIGNIFICANT CHANGE UP (ref 135–145)
T4 FREE SERPL-MCNC: 1.4 NG/DL — SIGNIFICANT CHANGE UP (ref 0.9–1.8)
TARGETS BLD QL SMEAR: SLIGHT — SIGNIFICANT CHANGE UP
TSH SERPL-MCNC: 1.1 UIU/ML — SIGNIFICANT CHANGE UP (ref 0.27–4.2)
WBC # BLD: 13.56 K/UL — HIGH (ref 3.8–10.5)
WBC # FLD AUTO: 13.56 K/UL — HIGH (ref 3.8–10.5)

## 2021-05-26 PROCEDURE — 99232 SBSQ HOSP IP/OBS MODERATE 35: CPT

## 2021-05-26 PROCEDURE — 88307 TISSUE EXAM BY PATHOLOGIST: CPT | Mod: 26

## 2021-05-26 PROCEDURE — 43242 EGD US FINE NEEDLE BX/ASPIR: CPT | Mod: GC

## 2021-05-26 PROCEDURE — 99231 SBSQ HOSP IP/OBS SF/LOW 25: CPT

## 2021-05-26 PROCEDURE — 99233 SBSQ HOSP IP/OBS HIGH 50: CPT | Mod: GC

## 2021-05-26 PROCEDURE — 88305 TISSUE EXAM BY PATHOLOGIST: CPT | Mod: 26

## 2021-05-26 PROCEDURE — 76770 US EXAM ABDO BACK WALL COMP: CPT | Mod: 26

## 2021-05-26 RX ORDER — POTASSIUM CHLORIDE 20 MEQ
10 PACKET (EA) ORAL
Refills: 0 | Status: COMPLETED | OUTPATIENT
Start: 2021-05-26 | End: 2021-05-26

## 2021-05-26 RX ORDER — SODIUM CHLORIDE 9 MG/ML
1000 INJECTION, SOLUTION INTRAVENOUS
Refills: 0 | Status: DISCONTINUED | OUTPATIENT
Start: 2021-05-26 | End: 2021-05-27

## 2021-05-26 RX ORDER — SODIUM CHLORIDE 9 MG/ML
500 INJECTION INTRAMUSCULAR; INTRAVENOUS; SUBCUTANEOUS
Refills: 0 | Status: DISCONTINUED | OUTPATIENT
Start: 2021-05-26 | End: 2021-05-27

## 2021-05-26 RX ORDER — POTASSIUM CHLORIDE 20 MEQ
40 PACKET (EA) ORAL ONCE
Refills: 0 | Status: COMPLETED | OUTPATIENT
Start: 2021-05-26 | End: 2021-05-26

## 2021-05-26 RX ADMIN — Medication 1: at 08:43

## 2021-05-26 RX ADMIN — Medication 100 MILLIGRAM(S): at 06:05

## 2021-05-26 RX ADMIN — Medication 100 MILLIEQUIVALENT(S): at 09:34

## 2021-05-26 RX ADMIN — Medication 100 MILLIEQUIVALENT(S): at 12:54

## 2021-05-26 RX ADMIN — Medication 100 MILLIGRAM(S): at 21:28

## 2021-05-26 RX ADMIN — Medication 100 MILLIEQUIVALENT(S): at 08:43

## 2021-05-26 RX ADMIN — Medication 100 MILLIGRAM(S): at 13:54

## 2021-05-26 RX ADMIN — Medication 40 MILLIEQUIVALENT(S): at 23:46

## 2021-05-26 NOTE — PROGRESS NOTE ADULT - SUBJECTIVE AND OBJECTIVE BOX
PROGRESS NOTE:   Authored by Dr. Nancy Bolden, MARLA pager [phone]    Patient is a 65y old  Male who presents with a chief complaint of hydronephrosis (26 May 2021 10:05)      SUBJECTIVE / OVERNIGHT EVENTS: Patient examined at bedside. He appeared well and denied any acute pain or issues. He is still having adequate output from left NT. He denies n/v/d/c, SOB, chest pain, abd pain, dizziness.     MEDICATIONS  (STANDING):  ceFAZolin   IVPB 2000 milliGRAM(s) IV Intermittent every 8 hours  dextrose 40% Gel 15 Gram(s) Oral once  dextrose 5%. 1000 milliLiter(s) (50 mL/Hr) IV Continuous <Continuous>  dextrose 5%. 1000 milliLiter(s) (100 mL/Hr) IV Continuous <Continuous>  dextrose 50% Injectable 25 Gram(s) IV Push once  dextrose 50% Injectable 12.5 Gram(s) IV Push once  dextrose 50% Injectable 25 Gram(s) IV Push once  glucagon  Injectable 1 milliGRAM(s) IntraMuscular once  insulin lispro (ADMELOG) corrective regimen sliding scale   SubCutaneous three times a day before meals  insulin lispro (ADMELOG) corrective regimen sliding scale   SubCutaneous at bedtime  lactated ringers. 1000 milliLiter(s) (75 mL/Hr) IV Continuous <Continuous>  polyethylene glycol 3350 17 Gram(s) Oral daily  potassium chloride  10 mEq/100 mL IVPB 10 milliEquivalent(s) IV Intermittent every 1 hour    MEDICATIONS  (PRN):      CAPILLARY BLOOD GLUCOSE      POCT Blood Glucose.: 187 mg/dL (26 May 2021 08:26)  POCT Blood Glucose.: 183 mg/dL (25 May 2021 21:15)  POCT Blood Glucose.: 128 mg/dL (25 May 2021 17:32)  POCT Blood Glucose.: 187 mg/dL (25 May 2021 12:49)    I&O's Summary    25 May 2021 07:01  -  26 May 2021 07:00  --------------------------------------------------------  IN: 1274 mL / OUT: 1825 mL / NET: -551 mL        PHYSICAL EXAM:  Vital Signs Last 24 Hrs  T(C): 36.8 (26 May 2021 08:30), Max: 36.9 (26 May 2021 05:09)  T(F): 98.2 (26 May 2021 08:30), Max: 98.4 (26 May 2021 05:09)  HR: 86 (26 May 2021 08:30) (81 - 86)  BP: 144/79 (26 May 2021 08:30) (132/84 - 156/88)  BP(mean): --  RR: 18 (26 May 2021 08:30) (18 - 20)  SpO2: 94% (26 May 2021 08:30) (94% - 97%)    GENERAL: No acute distress, well-developed  HEAD:  Atraumatic, Normocephalic  EYES: EOMI, PERRLA, conjunctiva and sclera clear  NECK: Supple, no lymphadenopathy, no JVD  CHEST/LUNG: CTAB; No wheezes, rales, or rhonchi  HEART: Regular rate and rhythm; No murmurs, rubs, or gallops  ABDOMEN: Soft, non-tender, non-distended; normal bowel sounds, no organomegaly, left NT with good output, yellowish/clear   EXTREMITIES:  2+ peripheral pulses b/l, No clubbing, cyanosis, or edema  NEUROLOGY: A&O x 3, no focal deficits  SKIN: No rashes or lesions    LABS:                        12.6   13.56 )-----------( 322      ( 26 May 2021 07:16 )             37.8     05-26    136  |  96  |  10  ----------------------------<  176<H>  3.1<L>   |  22  |  1.38<H>    Ca    8.8      26 May 2021 07:06  Phos  3.8     05-26  Mg     1.6     05-26    TPro  7.6  /  Alb  3.1<L>  /  TBili  0.6  /  DBili  x   /  AST  41<H>  /  ALT  54<H>  /  AlkPhos  107  05-26    PTT - ( 26 May 2021 07:06 )  PTT:39.0 sec          Culture - Blood (collected 24 May 2021 17:26)  Source: .Blood Blood-Peripheral  Preliminary Report (25 May 2021 18:01):    No growth to date.    Culture - Blood (collected 24 May 2021 17:26)  Source: .Blood Blood-Peripheral  Preliminary Report (25 May 2021 18:01):    No growth to date.    Culture - Urine (collected 23 May 2021 18:13)  Source: .Urine Nephrostomy - Left  Final Report (25 May 2021 15:14):    >100,000 CFU/ml Staphylococcus aureus  Organism: Staphylococcus aureus (25 May 2021 15:14)  Organism: Staphylococcus aureus (25 May 2021 15:14)        RADIOLOGY & ADDITIONAL TESTS:  Results Reviewed:   Imaging Personally Reviewed:  Electrocardiogram Personally Reviewed:    COORDINATION OF CARE:  Care Discussed with Consultants/Other Providers [Y/N]:  Prior or Outpatient Records Reviewed [Y/N]:

## 2021-05-26 NOTE — PRE PROCEDURE NOTE - PROCEDURE SERVICE
Endoscopy
Vascular and Interventional Radiology
Patient requests all Lab and Radiology Results on their Discharge Instructions

## 2021-05-26 NOTE — PROGRESS NOTE ADULT - ASSESSMENT
65 year old male with pmhx of DM on metformin and nephrolithiasis presents to ED with urosepsis secondary to left nephrolithiases s/p IR placement of percutaneous nephrostomy tube, who incidentally found to have pancreatic body mass, CEA 37.7 and CA 19-9 1577, MRCP showing 5 x 4cm pancreatic body mass encasing the splenic artery and left adrenal lesion, concern for pancreatic adenocarcinoma.    PLAN:    - No surgical intervention at this time  - Will follow up GI for eventual EUS/tissue biopsy today    Guerrero Saavedra MD  General Surgery, PGY 2

## 2021-05-26 NOTE — PROGRESS NOTE ADULT - ASSESSMENT
65 year old male with pmhx of DM on metformin and nephrolithiasis (urologist in [place]) presents to ED with left lower quadrant pain x 20 days, worsened 1 week ago and decreased urinary output found to have an obstructing stone causing L hydronephrosis taken for urgent NT placement with Ir and likely pyelonephritis started on zosyn now switched to ancef after getting UCx growing S. aureus

## 2021-05-26 NOTE — PROGRESS NOTE ADULT - SUBJECTIVE AND OBJECTIVE BOX
Surgery Progress Note    S: Patient seen and examined. No acute events overnight.     O:  Physical Exam:  Gen: Laying in bed, NAD  HEENT: atrumatic, EMOI  Resp: Unlabored breathing  Abd: soft, nontender, nondistended, no rebound or guarding.    Ext: Moves 4 extremities spontaneously    Vital Signs Last 24 Hrs  T(C): 36.8 (26 May 2021 08:30), Max: 36.9 (26 May 2021 05:09)  T(F): 98.2 (26 May 2021 08:30), Max: 98.4 (26 May 2021 05:09)  HR: 86 (26 May 2021 08:30) (81 - 86)  BP: 144/79 (26 May 2021 08:30) (132/84 - 156/88)  BP(mean): --  RR: 18 (26 May 2021 08:30) (18 - 20)  SpO2: 94% (26 May 2021 08:30) (94% - 97%)    I&O's Detail    25 May 2021 07:01  -  26 May 2021 07:00  --------------------------------------------------------  IN:    Heparin Infusion: 264 mL    IV PiggyBack: 50 mL    Oral Fluid: 960 mL  Total IN: 1274 mL    OUT:    Nephrostomy Tube (mL): 1575 mL    Voided (mL): 250 mL  Total OUT: 1825 mL    Total NET: -551 mL                                12.6   13.56 )-----------( 322      ( 26 May 2021 07:16 )             37.8       05-26    136  |  96  |  10  ----------------------------<  176<H>  3.1<L>   |  22  |  1.38<H>    Ca    8.8      26 May 2021 07:06  Phos  3.8     05-26  Mg     1.6     05-26    TPro  7.6  /  Alb  3.1<L>  /  TBili  0.6  /  DBili  x   /  AST  41<H>  /  ALT  54<H>  /  AlkPhos  107  05-26

## 2021-05-26 NOTE — PROGRESS NOTE ADULT - ATTENDING COMMENTS
discussed above plan with resident on rounds. patient seen and examined. no acute complaints. denies abd pain.    labs and imaging reviewed.  I agree with the above findings, assessment, and plan with the following additions and exceptions:  65M with hx of nephrolithiasis, DM presents with decreased urine output and L flank pain. CTAP with 2cm L proximal ureteral stone with severe hydro and pancreatic mass with splenic vein thrombosis.    # sepsis due to UTI/pyelonephritis found to have severe L hydro 2/2 ureteral stone s/p NT by IR, UCx with staph aureus (no previous/recent instrumentation). bcx 1/4 bottles CNS - suspect contaminant but now with MSSA bacteremia (1/4 bottles)   repeat bcx 5/24 NTD  initially on vancomycin now changed to ancef  renal us without abscess and improved hydro and TTE without endocarditis  ID f/u appreciated  Urology f/u      # pancreatic mass - CEA, Ca19-9 elevated and MRCP highly suspicious for malignancy - EUS with biopsy today. 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 surg/onc feels likely does not need a/c for splenic v thrombosis. will discuss with hematology and GI regarding a/c     # DM II - a1c 7.9. monitor FS - ISS for coverage

## 2021-05-26 NOTE — PROGRESS NOTE ADULT - PROBLEM SELECTOR PLAN 2
Meets SIRs criteria, tachy with elevated WBC, given 2-3L fluid in ED  - lactate went from 3--> 1.5   - given ceftriaxone in ED   - UA positive for WBC, LE  - s/p zosyn   - UCx growing s. aureus, no prior instrumentation noted  - one bcx tube culture growing gram +, likely contaminant, repeat BCx NGTD  - c/w ancf 2g q8 per ID recs, will likely need for extended time   - f/u TTE

## 2021-05-26 NOTE — PRE PROCEDURE NOTE - PRE PROCEDURE EVALUATION
Attending Physician:   Dr. Su                         Procedure:   EUS    Indication for Procedure:   Pancreatic Mass  ________________________________________________________  PAST MEDICAL & SURGICAL HISTORY:    Diabetes  Pancreatic Mass  Kidney stone on left side      ALLERGIES:  No Known Allergies    HOME MEDICATIONS:  metFORMIN 500 mg oral tablet: 1 tab(s) orally 2 times a day    AICD/PPM: [ ] yes   [X ] no    PERTINENT LAB DATA:                        12.6   13.56 )-----------( 322      ( 26 May 2021 07:16 )             37.8     05-26    136  |  96  |  10  ----------------------------<  176<H>  3.1<L>   |  22  |  1.38<H>    Ca    8.8      26 May 2021 07:06  Phos  3.8     05-26  Mg     1.6     05-26  TPro  7.6  /  Alb  3.1<L>  /  TBili  0.6  /  DBili  x   /  AST  41<H>  /  ALT  54<H>  /  AlkPhos  107  05-26  PTT - ( 26 May 2021 07:06 )  PTT:39.0 sec    PHYSICAL EXAMINATION:    T(C): 36.7  HR: 80  BP: 145/84  RR: 18  SpO2: 94%    Constitutional: NAD    Neck:  No JVD  Respiratory: CTAB/L  Cardiovascular: S1 and S2  Gastrointestinal: BS+, soft, NT/ND  Extremities: No peripheral edema  Neurological: A/O x 4        COMMENTS:    The patient is a suitable candidate for the planned procedure unless box checked [ ]  No, explain:

## 2021-05-26 NOTE — PROGRESS NOTE ADULT - ASSESSMENT
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

## 2021-05-26 NOTE — PROGRESS NOTE ADULT - SUBJECTIVE AND OBJECTIVE BOX
Interventional Radiology Follow-Up Note.     Patient seen and examined @ bedside between 8AM and 9 Am.    This is a 65y Male s/p Left PCN on 5/23 in Interventional Radiology with Dr. Berman.   Denies abdominal pain, flank pain.  Planning for EUS with biopsy for pancreatic mass.    Medication:   ceFAZolin   IVPB: (05-26)  heparin  Infusion.: (05-25)  piperacillin/tazobactam IVPB..: (05-25)  vancomycin  IVPB: (05-24)  vancomycin  IVPB: (05-25)    Vitals:   T(F): 98.2, Max: 98.4 (05:09)  HR: 86  BP: 144/79  RR: 18  SpO2: 94%    Physical Exam:  General: Nontoxic, in NAD.  Abdomen: soft, NTND.   left flank Drain Device: Drain intact attached to leg bag.  Flushed with 5cc NS w/o difficulty. Dressing clean, dry, intact. Clear yellow urine draining.  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 Berman. culture (+) staph aureus.  Pt also found to have pancreatic mass for EUS biopsy today.    - IV ancef.  - Flush drain with 5cc NS daily forward only; DO NOT aspirate  - Change dressing q3 days or when dressing is saturated.  -  Monitor h/h; transfuse as needed  - Trend vs/labs  - Continue global management per primary team  -Urology f/u for stone management.   -regarding outpatient follow up with IR, if the patient is d/c home with drainage catheter they can make an appointment with IR by calling the IR booking office at [phone]. Patient to f/u with urology for further stone management. IR f/u per urology recs. If drain remains in place long term drain will need to be exchanged in 3 months or sooner if there are any issues.  -they will benefit from VNS service to help with drainage catheter care; they should continue same drainage catheter care as an outpatient.     Please [place] at [phone] with any questions, concerns, or issues regarding above.

## 2021-05-26 NOTE — PROGRESS NOTE ADULT - PROBLEM SELECTOR PLAN 3
most likely contrast induced  - started fluids at 75ml/hr  - repeat BMP in PM   - monitor creatinine     Hypokalemia   - replete as necessary   - repeat BMP in PM

## 2021-05-26 NOTE — PROGRESS NOTE ADULT - PROBLEM SELECTOR PLAN 4
Incidental finding of pancreatic mass on Ct, in setting of weight loss   -  benign vs malignant - cyst vs adenocarcinoma vs lymphoma vs NET  - MRCP showing tumor without involvement of vessels but with encasement of splenic artery   - GI recs appreciated   -  and CEA elevated   - surg onc recs appreciated   - CT chest with contrast and head without contrast to evaluate for workup, found to have adrenal mass, bone islands and left thyroid enlargement--> will send for TSH/free T4 for AM labs tomorrow to further assess  - EUS planned for today with adv GI

## 2021-05-26 NOTE — PRE-ANESTHESIA EVALUATION ADULT - NSANTHOSAYNRD_GEN_A_CORE
No. LAWANDA screening performed.  STOP BANG Legend: 0-2 = LOW Risk; 3-4 = INTERMEDIATE Risk; 5-8 = HIGH Risk

## 2021-05-26 NOTE — PROGRESS NOTE ADULT - SUBJECTIVE AND OBJECTIVE BOX
TSERING SCANLON 65y [id number]    Patient is a 65y old  Male who presents with a chief complaint of hydronephrosis (26 May 2021 10:17)      Follow Up/CC:  ID following for bacteremia    Interval History/ROS: no fever    Allergies    No Known Allergies    Intolerances        ANTIMICROBIALS:  ceFAZolin   IVPB 2000 every 8 hours      MEDICATIONS  (STANDING):  ceFAZolin   IVPB 2000 milliGRAM(s) IV Intermittent every 8 hours  dextrose 40% Gel 15 Gram(s) Oral once  dextrose 5%. 1000 milliLiter(s) (50 mL/Hr) IV Continuous <Continuous>  dextrose 5%. 1000 milliLiter(s) (100 mL/Hr) IV Continuous <Continuous>  dextrose 50% Injectable 25 Gram(s) IV Push once  dextrose 50% Injectable 12.5 Gram(s) IV Push once  dextrose 50% Injectable 25 Gram(s) IV Push once  glucagon  Injectable 1 milliGRAM(s) IntraMuscular once  insulin lispro (ADMELOG) corrective regimen sliding scale   SubCutaneous three times a day before meals  insulin lispro (ADMELOG) corrective regimen sliding scale   SubCutaneous at bedtime  lactated ringers. 1000 milliLiter(s) (75 mL/Hr) IV Continuous <Continuous>  polyethylene glycol 3350 17 Gram(s) Oral daily  sodium chloride 0.9%. 500 milliLiter(s) (30 mL/Hr) IV Continuous <Continuous>    MEDICATIONS  (PRN):        Vital Signs Last 24 Hrs  T(C): 36.4 (26 May 2021 16:58), Max: 36.9 (26 May 2021 05:09)  T(F): 97.6 (26 May 2021 14:55), Max: 98.4 (26 May 2021 05:09)  HR: 84 (26 May 2021 16:58) (80 - 86)  BP: 139/71 (26 May 2021 16:58) (132/84 - 145/84)  BP(mean): --  RR: 19 (26 May 2021 16:58) (18 - 20)  SpO2: 96% (26 May 2021 16:58) (93% - 97%)    CBC Full  -  ( 26 May 2021 07:16 )  WBC Count : 13.56 K/uL  RBC Count : 4.57 M/uL  Hemoglobin : 12.6 g/dL  Hematocrit : 37.8 %  Platelet Count - Automated : 322 K/uL  Mean Cell Volume : 82.7 fl  Mean Cell Hemoglobin : 27.6 pg  Mean Cell Hemoglobin Concentration : 33.3 gm/dL  Auto Neutrophil # : 10.45 K/uL  Auto Lymphocyte # : 1.75 K/uL  Auto Monocyte # : 0.87 K/uL  Auto Eosinophil # : 0.00 K/uL  Auto Basophil # : 0.00 K/uL  Auto Neutrophil % : 75.2 %  Auto Lymphocyte % : 12.9 %  Auto Monocyte % : 6.4 %  Auto Eosinophil % : 0.0 %  Auto Basophil % : 0.0 %    05-26    136  |  96  |  10  ----------------------------<  176<H>  3.1<L>   |  22  |  1.38<H>    Ca    8.8      26 May 2021 07:06  Phos  3.8     05-26  Mg     1.6     05-26    TPro  7.6  /  Alb  3.1<L>  /  TBili  0.6  /  DBili  x   /  AST  41<H>  /  ALT  54<H>  /  AlkPhos  107  05-26    LIVER FUNCTIONS - ( 26 May 2021 07:06 )  Alb: 3.1 g/dL / Pro: 7.6 g/dL / ALK PHOS: 107 U/L / ALT: 54 U/L / AST: 41 U/L / GGT: x               MICROBIOLOGY:  .Blood Blood-Peripheral  05-24-21   No growth to date.  --  --      .Urine Nephrostomy - Left  05-23-21   >100,000 CFU/ml Staphylococcus aureus  --  Staphylococcus aureus      .Blood Blood-Peripheral  05-23-21   Growth in aerobic bottle: Staphylococcus aureus  Please note change in PCR report:  CNS= not detected  ***Blood Panel PCR results on this specimen are available  approximately 3 hours after the Gram stain result.***  Gram stain, PCR, and/or cultureresults may not always  correspond due to difference in methodologies.  ************************************************************  This PCR assay was performed by multiplex PCR. This  Assay tests for 66 bacterial and resistance gene targets.  Please refer to the [facility] test directory  at [email or web address] for details.  --  Blood Culture PCR  Gram Positive Cocci in Clusters      .Urine Clean Catch (Midstream)  05-23-21   >100,000 CFU/ml Staphylococcus aureus  --  Staphylococcus aureus        RADIOLOGY    < from: US Kidney and Bladder (05.26.21 @ 11:28) >  Mild left hydronephrosis, decreased since 5/22/2021. Low-level echoes are seen within the dilated left renal collecting system. Correlation with urinalysis is suggested to assess for possible infection.    A 1.6 cm calculus is seen in the left renal pelvis and a 0.8 cm calculus is in the proximal left ureter.    < end of copied text >

## 2021-05-27 ENCOUNTER — TRANSCRIPTION ENCOUNTER (OUTPATIENT)
Age: 66
End: 2021-05-27

## 2021-05-27 LAB
ALBUMIN SERPL ELPH-MCNC: 2.6 G/DL — LOW (ref 3.3–5)
ALP SERPL-CCNC: 99 U/L — SIGNIFICANT CHANGE UP (ref 40–120)
ALT FLD-CCNC: 30 U/L — SIGNIFICANT CHANGE UP (ref 10–45)
ANION GAP SERPL CALC-SCNC: 14 MMOL/L — SIGNIFICANT CHANGE UP (ref 5–17)
ANION GAP SERPL CALC-SCNC: 14 MMOL/L — SIGNIFICANT CHANGE UP (ref 5–17)
AST SERPL-CCNC: 35 U/L — SIGNIFICANT CHANGE UP (ref 10–40)
BASOPHILS # BLD AUTO: 0.06 K/UL — SIGNIFICANT CHANGE UP (ref 0–0.2)
BASOPHILS NFR BLD AUTO: 0.5 % — SIGNIFICANT CHANGE UP (ref 0–2)
BILIRUB SERPL-MCNC: 0.4 MG/DL — SIGNIFICANT CHANGE UP (ref 0.2–1.2)
BUN SERPL-MCNC: 14 MG/DL — SIGNIFICANT CHANGE UP (ref 7–23)
BUN SERPL-MCNC: 14 MG/DL — SIGNIFICANT CHANGE UP (ref 7–23)
CALCIUM SERPL-MCNC: 8.4 MG/DL — SIGNIFICANT CHANGE UP (ref 8.4–10.5)
CALCIUM SERPL-MCNC: 8.5 MG/DL — SIGNIFICANT CHANGE UP (ref 8.4–10.5)
CHLORIDE SERPL-SCNC: 98 MMOL/L — SIGNIFICANT CHANGE UP (ref 96–108)
CHLORIDE SERPL-SCNC: 98 MMOL/L — SIGNIFICANT CHANGE UP (ref 96–108)
CO2 SERPL-SCNC: 22 MMOL/L — SIGNIFICANT CHANGE UP (ref 22–31)
CO2 SERPL-SCNC: 22 MMOL/L — SIGNIFICANT CHANGE UP (ref 22–31)
CREAT SERPL-MCNC: 1.39 MG/DL — HIGH (ref 0.5–1.3)
CREAT SERPL-MCNC: 1.51 MG/DL — HIGH (ref 0.5–1.3)
EOSINOPHIL # BLD AUTO: 0.25 K/UL — SIGNIFICANT CHANGE UP (ref 0–0.5)
EOSINOPHIL NFR BLD AUTO: 2.1 % — SIGNIFICANT CHANGE UP (ref 0–6)
GLUCOSE BLDC GLUCOMTR-MCNC: 150 MG/DL — HIGH (ref 70–99)
GLUCOSE BLDC GLUCOMTR-MCNC: 152 MG/DL — HIGH (ref 70–99)
GLUCOSE BLDC GLUCOMTR-MCNC: 176 MG/DL — HIGH (ref 70–99)
GLUCOSE BLDC GLUCOMTR-MCNC: 177 MG/DL — HIGH (ref 70–99)
GLUCOSE SERPL-MCNC: 156 MG/DL — HIGH (ref 70–99)
GLUCOSE SERPL-MCNC: 182 MG/DL — HIGH (ref 70–99)
HAV IGM SER-ACNC: SIGNIFICANT CHANGE UP
HBV CORE AB SER-ACNC: SIGNIFICANT CHANGE UP
HBV SURFACE AB SER-ACNC: SIGNIFICANT CHANGE UP
HBV SURFACE AG SER-ACNC: SIGNIFICANT CHANGE UP
HCT VFR BLD CALC: 36.9 % — LOW (ref 39–50)
HGB BLD-MCNC: 11.7 G/DL — LOW (ref 13–17)
HIV 1+2 AB+HIV1 P24 AG SERPL QL IA: SIGNIFICANT CHANGE UP
IMM GRANULOCYTES NFR BLD AUTO: 3 % — HIGH (ref 0–1.5)
LYMPHOCYTES # BLD AUTO: 1.52 K/UL — SIGNIFICANT CHANGE UP (ref 1–3.3)
LYMPHOCYTES # BLD AUTO: 12.9 % — LOW (ref 13–44)
MAGNESIUM SERPL-MCNC: 1.7 MG/DL — SIGNIFICANT CHANGE UP (ref 1.6–2.6)
MCHC RBC-ENTMCNC: 26.8 PG — LOW (ref 27–34)
MCHC RBC-ENTMCNC: 31.7 GM/DL — LOW (ref 32–36)
MCV RBC AUTO: 84.6 FL — SIGNIFICANT CHANGE UP (ref 80–100)
MONOCYTES # BLD AUTO: 0.83 K/UL — SIGNIFICANT CHANGE UP (ref 0–0.9)
MONOCYTES NFR BLD AUTO: 7.1 % — SIGNIFICANT CHANGE UP (ref 2–14)
NEUTROPHILS # BLD AUTO: 8.75 K/UL — HIGH (ref 1.8–7.4)
NEUTROPHILS NFR BLD AUTO: 74.4 % — SIGNIFICANT CHANGE UP (ref 43–77)
NRBC # BLD: 0 /100 WBCS — SIGNIFICANT CHANGE UP (ref 0–0)
OSMOLALITY SERPL: 289 MOSMOL/KG — SIGNIFICANT CHANGE UP (ref 280–301)
PHOSPHATE SERPL-MCNC: 3.5 MG/DL — SIGNIFICANT CHANGE UP (ref 2.5–4.5)
PLATELET # BLD AUTO: 320 K/UL — SIGNIFICANT CHANGE UP (ref 150–400)
POTASSIUM SERPL-MCNC: 3.4 MMOL/L — LOW (ref 3.5–5.3)
POTASSIUM SERPL-MCNC: 3.7 MMOL/L — SIGNIFICANT CHANGE UP (ref 3.5–5.3)
POTASSIUM SERPL-SCNC: 3.4 MMOL/L — LOW (ref 3.5–5.3)
POTASSIUM SERPL-SCNC: 3.7 MMOL/L — SIGNIFICANT CHANGE UP (ref 3.5–5.3)
PROT SERPL-MCNC: 7.1 G/DL — SIGNIFICANT CHANGE UP (ref 6–8.3)
RBC # BLD: 4.36 M/UL — SIGNIFICANT CHANGE UP (ref 4.2–5.8)
RBC # FLD: 14.9 % — HIGH (ref 10.3–14.5)
SODIUM SERPL-SCNC: 134 MMOL/L — LOW (ref 135–145)
SODIUM SERPL-SCNC: 134 MMOL/L — LOW (ref 135–145)
WBC # BLD: 11.76 K/UL — HIGH (ref 3.8–10.5)
WBC # FLD AUTO: 11.76 K/UL — HIGH (ref 3.8–10.5)

## 2021-05-27 PROCEDURE — 99232 SBSQ HOSP IP/OBS MODERATE 35: CPT | Mod: GC

## 2021-05-27 PROCEDURE — 99231 SBSQ HOSP IP/OBS SF/LOW 25: CPT

## 2021-05-27 PROCEDURE — 99233 SBSQ HOSP IP/OBS HIGH 50: CPT | Mod: GC

## 2021-05-27 PROCEDURE — 99232 SBSQ HOSP IP/OBS MODERATE 35: CPT

## 2021-05-27 PROCEDURE — 71045 X-RAY EXAM CHEST 1 VIEW: CPT | Mod: 26

## 2021-05-27 RX ORDER — SODIUM CHLORIDE 9 MG/ML
1000 INJECTION INTRAMUSCULAR; INTRAVENOUS; SUBCUTANEOUS
Refills: 0 | Status: DISCONTINUED | OUTPATIENT
Start: 2021-05-27 | End: 2021-05-28

## 2021-05-27 RX ORDER — HEPARIN SODIUM 5000 [USP'U]/ML
5000 INJECTION INTRAVENOUS; SUBCUTANEOUS EVERY 8 HOURS
Refills: 0 | Status: DISCONTINUED | OUTPATIENT
Start: 2021-05-27 | End: 2021-05-28

## 2021-05-27 RX ORDER — POTASSIUM CHLORIDE 20 MEQ
40 PACKET (EA) ORAL ONCE
Refills: 0 | Status: COMPLETED | OUTPATIENT
Start: 2021-05-27 | End: 2021-05-27

## 2021-05-27 RX ORDER — CEFAZOLIN SODIUM 1 G
2000 VIAL (EA) INJECTION
Qty: 150000 | Refills: 0
Start: 2021-05-27 | End: 2021-06-20

## 2021-05-27 RX ADMIN — Medication 1: at 13:05

## 2021-05-27 RX ADMIN — Medication 40 MILLIEQUIVALENT(S): at 20:21

## 2021-05-27 RX ADMIN — SODIUM CHLORIDE 100 MILLILITER(S): 9 INJECTION INTRAMUSCULAR; INTRAVENOUS; SUBCUTANEOUS at 13:13

## 2021-05-27 RX ADMIN — Medication 100 MILLIGRAM(S): at 22:57

## 2021-05-27 RX ADMIN — HEPARIN SODIUM 5000 UNIT(S): 5000 INJECTION INTRAVENOUS; SUBCUTANEOUS at 22:56

## 2021-05-27 RX ADMIN — SODIUM CHLORIDE 100 MILLILITER(S): 9 INJECTION INTRAMUSCULAR; INTRAVENOUS; SUBCUTANEOUS at 20:23

## 2021-05-27 RX ADMIN — Medication 0: at 18:18

## 2021-05-27 RX ADMIN — Medication 100 MILLIGRAM(S): at 14:45

## 2021-05-27 RX ADMIN — Medication 100 MILLIGRAM(S): at 05:09

## 2021-05-27 NOTE — DISCHARGE NOTE PROVIDER - NSDCFUSCHEDAPPT_GEN_ALL_CORE_FT
TSERING SCANLON ; 06/01/2021 ; NPP Multid[facility] [address] [facility]  TSERING SCANLON ; 06/01/2021 ; NPP Multid[facility] [address] [facility] TSERING SCANLON ; 06/07/2021 ; NPP Urology [address]  TSERING SCANLON ; 06/28/2021 ; NPP Med Nephro [address]

## 2021-05-27 NOTE — DISCHARGE NOTE PROVIDER - NSDCFUADDINST_GEN_ALL_CORE_FT
- Flush drain with 5cc NS daily forward only; DO NOT aspirate  - Change dressing q3 days or when dressing is saturated.    2cm Left proximal ureteral stone with severe hydro now s/p left nephrostomy placement on 5/23 in Interventional Radiology with Dr. Berman.

## 2021-05-27 NOTE — DISCHARGE NOTE PROVIDER - PROVIDER TOKENS
PROVIDER:[TOKEN:[[id number]],FOLLOWUP:[2 weeks],ESTABLISHEDPATIENT:[T]],PROVIDER:[TOKEN:[[id number]],FOLLOWUP:[1 week],ESTABLISHEDPATIENT:[T]]

## 2021-05-27 NOTE — PROGRESS NOTE ADULT - SUBJECTIVE AND OBJECTIVE BOX
Surgery Progress Note    S: Patient seen and examined. No acute events overnight. Reports tolerating diet without nausea, vomiting, passing flatus, having bowel movements, voiding without issues, have been ambulating and out of bed. Denies fever, chills, SOB, chest pain.     O:  Physical Exam:  Gen: Laying in bed, NAD  HEENT: atrumatic, EMOI  Resp: Unlabored breathing  Abd: soft, nontender, nondistended, no rebound or guarding.    Ext: Moves 4 extremities spontaneously    Vital Signs Last 24 Hrs  T(C): 36.6 (27 May 2021 14:05), Max: 36.8 (27 May 2021 05:49)  T(F): 97.8 (27 May 2021 14:05), Max: 98.3 (27 May 2021 09:13)  HR: 81 (27 May 2021 14:05) (77 - 96)  BP: 159/84 (27 May 2021 14:05) (120/75 - 159/84)  BP(mean): --  RR: 18 (27 May 2021 14:05) (18 - 20)  SpO2: 95% (27 May 2021 14:05) (92% - 96%)    I&O's Detail    26 May 2021 07:01  -  27 May 2021 07:00  --------------------------------------------------------  IN:  Total IN: 0 mL    OUT:    Nephrostomy Tube (mL): 650 mL    Voided (mL): 250 mL  Total OUT: 900 mL    Total NET: -900 mL      27 May 2021 07:01  -  27 May 2021 16:09  --------------------------------------------------------  IN:  Total IN: 0 mL    OUT:    Nephrostomy Tube (mL): 300 mL    Voided (mL): 200 mL  Total OUT: 500 mL    Total NET: -500 mL                                11.7   11.76 )-----------( 320      ( 27 May 2021 07:02 )             36.9       05-27    134<L>  |  98  |  14  ----------------------------<  182<H>  3.7   |  22  |  1.51<H>    Ca    8.5      27 May 2021 07:00  Phos  3.5     05-27  Mg     1.7     05-27    TPro  7.1  /  Alb  2.6<L>  /  TBili  0.4  /  DBili  x   /  AST  35  /  ALT  30  /  AlkPhos  99  05-27

## 2021-05-27 NOTE — PROGRESS NOTE ADULT - SUBJECTIVE AND OBJECTIVE BOX
Chief Complaint:  Patient is a 65y old  Male who presents with a chief complaint of hydronephrosis (27 May 2021 08:28)    Interval Events:   No acute overnight events  s/p EUS-FNB    Allergies:  No Known Allergies      Hospital Medications:  ceFAZolin   IVPB 2000 milliGRAM(s) IV Intermittent every 8 hours  dextrose 40% Gel 15 Gram(s) Oral once  dextrose 5%. 1000 milliLiter(s) IV Continuous <Continuous>  dextrose 5%. 1000 milliLiter(s) IV Continuous <Continuous>  dextrose 50% Injectable 25 Gram(s) IV Push once  dextrose 50% Injectable 12.5 Gram(s) IV Push once  dextrose 50% Injectable 25 Gram(s) IV Push once  glucagon  Injectable 1 milliGRAM(s) IntraMuscular once  insulin lispro (ADMELOG) corrective regimen sliding scale   SubCutaneous three times a day before meals  insulin lispro (ADMELOG) corrective regimen sliding scale   SubCutaneous at bedtime  lactated ringers. 1000 milliLiter(s) IV Continuous <Continuous>  polyethylene glycol 3350 17 Gram(s) Oral daily  sodium chloride 0.9%. 500 milliLiter(s) IV Continuous <Continuous>      PMHX/PSHX:  Diabetes    Kidney stone on left side        ROS:   General:  No fevers, chills or night sweats  ENT:  No sore throat or dysphagia  CV:  No pain or palpitations  Resp:  No dyspnea, cough or  wheezing  GI:  No pain, No nausea, No vomiting, No diarrhea, No rectal bleeding, No tarry stools,  Skin:  No rash or edema    PHYSICAL EXAM:   Vital Signs:  Vital Signs Last 24 Hrs  T(C): 36.8 (27 May 2021 09:13), Max: 36.8 (27 May 2021 05:49)  T(F): 98.3 (27 May 2021 09:13), Max: 98.3 (27 May 2021 09:13)  HR: 77 (27 May 2021 09:13) (77 - 96)  BP: 144/80 (27 May 2021 09:13) (120/75 - 145/84)  BP(mean): --  RR: 18 (27 May 2021 09:13) (18 - 20)  SpO2: 93% (27 May 2021 09:13) (92% - 96%)  Daily Height in cm: 177.8 (26 May 2021 16:58)    Daily     GENERAL:  NAD, Appears stated age  HEENT:  NC/AT,  conjunctivae clear and pink  CHEST:  Normal Effort, Normal rate  HEART:  RRR, S1 + S2  ABDOMEN:  Soft, non-tender, non-distended, BS+  EXTEREMITIES:  no cyanosis  SKIN:  Warm & Dry.  NEURO:  Alert, oriented    LABS:                        11.7   11.76 )-----------( 320      ( 27 May 2021 07:02 )             36.9     Mean Cell Volume: 84.6 fl (05-27-21 @ 07:02)    05-27    134<L>  |  98  |  14  ----------------------------<  182<H>  3.7   |  22  |  1.51<H>    Ca    8.5      27 May 2021 07:00  Phos  3.5     05-27  Mg     1.7     05-27    TPro  7.1  /  Alb  2.6<L>  /  TBili  0.4  /  DBili  x   /  AST  35  /  ALT  30  /  AlkPhos  99  05-27    LIVER FUNCTIONS - ( 27 May 2021 07:00 )  Alb: 2.6 g/dL / Pro: 7.1 g/dL / ALK PHOS: 99 U/L / ALT: 30 U/L / AST: 35 U/L / GGT: x           PTT - ( 26 May 2021 07:06 )  PTT:39.0 sec                            11.7   11.76 )-----------( 320      ( 27 May 2021 07:02 )             36.9                         12.6   13.56 )-----------( 322      ( 26 May 2021 07:16 )             37.8                         12.6   13.41 )-----------( 319      ( 25 May 2021 10:01 )             38.4                         12.3   11.11 )-----------( 246      ( 24 May 2021 19:07 )             37.5       Imaging:

## 2021-05-27 NOTE — DISCHARGE NOTE PROVIDER - NSDCCPCAREPLAN_GEN_ALL_CORE_FT
PRINCIPAL DISCHARGE DIAGNOSIS  Diagnosis: Pyelonephritis  Assessment and Plan of Treatment: You were found to have a kidney infection and back up due to a large kidney stone. IR placed a nephrostomy tube connected to  the left kidney. Please continue to flush and drain the tube daily. If there is no output or you beleive that your drain is clogged please call your doctor. Please continue your IV antibiotics as there was bacteria in your urine as well as your blood (MSSA bacteria). If you begin spiking fevers or experiencing severe abdominal pain please go to your closest emergency room. Please follow up with Interventional radiology (IR) in 3 months. Please make an appointment with IR by calling the IR booking office at [phone]; recommend IR follow in 3 months for tube evaluation.      SECONDARY DISCHARGE DIAGNOSES  Diagnosis: ELIESER (acute kidney injury)  Assessment and Plan of Treatment: You were found to have an increase in your createnine. Please continue to stay hydrated. We will perform weekly blood draws to monitor your createnine and kidney function. Please follow up with Dr. Franklin and your PCP in 1- 2 weeks for further management.    Diagnosis: Pancreatic mass  Assessment and Plan of Treatment: You were incidentally found to have a pancreatic body mass, CEA 37.7 and CA 19-9 1577 and  MRCP showing 5 x 4cm pancreatic body mass encasing the splenic artery and left adrenal lesion, concern for pancreatic adenocarcinoma. GI was able to perform an endoscopic ultrasound and biopsy the lesion. Please follow up with GI and surgical oncology for your results and further management.     PRINCIPAL DISCHARGE DIAGNOSIS  Diagnosis: Pyelonephritis  Assessment and Plan of Treatment: You were found to have a kidney infection and back up due to a large kidney stone. IR placed a nephrostomy tube connected to  the left kidney. Please continue to flush and drain the tube daily. If there is no output or you beleive that your drain is clogged please call your doctor. Please continue your IV antibiotics as there was bacteria in your urine as well as your blood (MSSA bacteria). If you begin spiking fevers or experiencing severe abdominal pain please go to your closest emergency room. Please follow up with Interventional radiology (IR) in 3 months. Please make an appointment with IR by calling the IR booking office at [phone]; recommend IR follow in 3 months for tube evaluation.      SECONDARY DISCHARGE DIAGNOSES  Diagnosis: ELIESER (acute kidney injury)  Assessment and Plan of Treatment: You were found to have an increase in your createnine. Please continue to stay hydrated. We will perform weekly blood draws to monitor your createnine and kidney function. Please follow up with Dr. Franklin and your PCP in 1- 2 weeks for further management. The nephrologists at the hospital assessed you while you were here, you can follow up with them in about 2 weeks for further management and reassurance of resolution of your acute kidney injury.    Diagnosis: Pancreatic mass  Assessment and Plan of Treatment: You were incidentally found to have a pancreatic body mass, CEA 37.7 and CA 19-9 1577 and  MRCP showing 5 x 4cm pancreatic body mass encasing the splenic artery and left adrenal lesion, concern for pancreatic adenocarcinoma. GI was able to perform an endoscopic ultrasound and biopsy the lesion. Please follow up with GI and surgical oncology for your results and further management.     PRINCIPAL DISCHARGE DIAGNOSIS  Diagnosis: Pyelonephritis  Assessment and Plan of Treatment: You were found to have a kidney infection and back up due to a large kidney stone. IR placed a nephrostomy tube connected to  the left kidney. Please continue to flush and drain the tube daily. If there is no output or you beleive that your drain is clogged please call your doctor. Please continue your IV antibiotics as there was bacteria in your urine as well as your blood (MSSA bacteria). If you begin spiking fevers or experiencing severe abdominal pain please go to your closest emergency room. Please follow up with Interventional radiology (IR) in 3 months. Please make an appointment with IR by calling the IR booking office at [phone]; recommend IR follow in 3 months for tube evaluation. Please flush the drain with 5cc normal saline daily forward only. Do not aspirate. Change dressing q3 days or when dressing is saturated. Please follow up with your outpatient urologist      SECONDARY DISCHARGE DIAGNOSES  Diagnosis: Pancreatic mass  Assessment and Plan of Treatment: You were incidentally found to have a pancreatic body mass, CEA 37.7 and CA 19-9 1577 and  MRCP showing 5 x 4cm pancreatic body mass encasing the splenic artery and left adrenal lesion, concern for pancreatic adenocarcinoma. GI was able to perform an endoscopic ultrasound and biopsy the lesion. Please follow up with GI and surgical oncology for your results and further management. You will need oncology follow up outpatient    Diagnosis: ELIESER (acute kidney injury)  Assessment and Plan of Treatment: You were found to have an increase in your createnine. Please continue to stay hydrated. We will perform weekly blood draws to monitor your createnine and kidney function. Please follow up with Dr. Franklin and your PCP in 1- 2 weeks for further management. The nephrologists at the hospital assessed you while you were here, you can follow up with them in about 2 weeks for further management and reassurance of resolution of your acute kidney injury.

## 2021-05-27 NOTE — DISCHARGE NOTE PROVIDER - NSDCFUADDAPPT_GEN_ALL_CORE_FT
Please follow up with your outpatient urologist for your kidney stone  Follow up with your PCP after discharge  Follow up with gastroenterology, surgical oncology, and oncology for your biopsy results and if you need treatment for the pancreatic mass    If your nephrostomy drain remains in place long term, follow up with interventional radiology since the drain will need to be exchanged in 3 months or sooner if there are any issues. Make an appointment with IR by calling the IR booking office at [phone].

## 2021-05-27 NOTE — PROGRESS NOTE ADULT - ATTENDING COMMENTS
discussed above plan with resident on rounds. patient seen and examined. no acute complaints. denies abd pain post procedure    labs and imaging reviewed.  I agree with the above findings, assessment, and plan with the following additions and exceptions:  65M with hx of nephrolithiasis, DM presents with decreased urine output and L flank pain. CTAP with 2cm L proximal ureteral stone with severe hydro and pancreatic mass with splenic vein thrombosis.    # sepsis due to UTI/pyelonephritis found to have severe L hydro 2/2 ureteral stone s/p NT by IR, UCx with staph aureus (no previous/recent instrumentation). bcx 1/4 bottles CNS - suspect contaminant but now with MSSA bacteremia (1/4 bottles)   repeat bcx 5/24 NTD  initially on vancomycin now changed to ancef - picc line placement today  renal us without abscess and improved hydro  TTE without endocarditis  ID f/u appreciated  Urology f/u      # pancreatic mass - CEA, Ca19-9 elevated and MRCP highly suspicious for malignancy - EUS with biopsy 5/26. 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      # DM II - a1c 7.9. monitor FS - ISS for coverage discussed above plan with resident on rounds. patient seen and examined. no acute complaints. denies abd pain post procedure    labs and imaging reviewed.  I agree with the above findings, assessment, and plan with the following additions and exceptions:  65M with hx of nephrolithiasis, DM presents with decreased urine output and L flank pain. CTAP with 2cm L proximal ureteral stone with severe hydro and pancreatic mass with splenic vein thrombosis.    # sepsis due to UTI/pyelonephritis found to have severe L hydro 2/2 ureteral stone s/p NT by IR, UCx with staph aureus (no previous/recent instrumentation). bcx 1/4 bottles CNS - suspect contaminant but now with MSSA bacteremia (1/4 bottles)   repeat bcx 5/24 NTD  initially on vancomycin now changed to ancef - picc line placement today  renal us without abscess and improved hydro  TTE without endocarditis  ID f/u appreciated  Urology f/u    #ELIESER - persistent despite fluids - will continue ivf hydration       # pancreatic mass - CEA, Ca19-9 elevated and MRCP highly suspicious for malignancy - EUS with biopsy 5/26. 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      # DM II - a1c 7.9. monitor FS - ISS for coverage

## 2021-05-27 NOTE — PROGRESS NOTE ADULT - SUBJECTIVE AND OBJECTIVE BOX
Interventional Radiology Follow-Up Note.        This is a 65y Male s/p Left PCN on 5/23 in Interventional Radiology with Dr. Berman.   Denies abdominal pain, flank pain.      Medication:   ceFAZolin   IVPB: (05-27)  heparin  Infusion.: (05-25)    Vitals:   T(F): 98.2, Max: 98.2 (08:30)  HR: 88  BP: 132/91  RR: 18  SpO2: 95%    Physical Exam:  General: Nontoxic, in NAD.  Abdomen: soft, NTND.   Left flank nephrostomy Drain Device: Drain intact attached to gravity drain bag.  Flushed with 5cc NS w/o difficulty. Dressing clean, dry, intact.   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 Berman. culture (+) staph aureus.  - IV abx.  - Flush drain with 5cc NS daily forward only; DO NOT aspirate  - Change dressing q3 days or when dressing is saturated.  -  Monitor h/h; transfuse as needed  - Trend vs/labs  - Continue global management per primary team  - If the patient is d/c home with drainage catheter, pt can make an appointment with IR by calling the IR booking office at [phone]; recommend IR follow in 3 months for tube evaluation.  - Pt will benefit from VNS service to help with drainage catheter care; Pt should continue same drainage catheter care as an outpatient.     Please call IR at [phone] with any questions, concerns, or issues regarding above.

## 2021-05-27 NOTE — PROGRESS NOTE ADULT - ATTENDING COMMENTS
65M with DM, nephrolithiasis and incidental finding of pancreatic mass, now s/p EUS FNB. Doing ok today.  Follow up pathology, further care per surg/onc.    Thank you for this interesting consult.  Please call the advanced GI service with any questions or concerns.

## 2021-05-27 NOTE — PROGRESS NOTE ADULT - PROBLEM SELECTOR PLAN 1
Found on CT imaging, two stones, obstructing, hx of nephrolithiasis   - s/p Left PCN and abscess drainage with IR  - I and O   - monitor output from nephrostomy tube  - f/u with US to assess status s/p NT  - Flush drain with 5cc NS daily forward only; DO NOT aspirate  - Change dressing q3 days or when dressing is saturated.  - Urology f/u for stone management.

## 2021-05-27 NOTE — PROGRESS NOTE ADULT - ASSESSMENT
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     Impression:  # Pancreatic mass s/p EUS-FNB of mass and peripancreatic lymph node  # Splenic vein thrombosis in setting of panc mass  # Obstructing ureteral stone with hydro s/p perc NT placement  # Pyelonephritis    Recommendation:  - Follow up pathology  - Continue Abx  - Supportive care per primary team    Leeanne Blank MD  Gastroenterology Fellow  [phone]  Available on Microsoft Teams  Please page on call fellow on weekends and after 5pm on weekdays: [phone]

## 2021-05-27 NOTE — PROGRESS NOTE ADULT - PROBLEM SELECTOR PLAN 3
most likely contrast induced  - fluids at 100ml/hr  - repeat BMP in PM   - monitor creatinine     Hypokalemia   - replete as necessary   - repeat BMP in PM

## 2021-05-27 NOTE — DISCHARGE NOTE PROVIDER - NSDCMRMEDTOKEN_GEN_ALL_CORE_FT
ceFAZolin 2 g/50 mL-NaCl 0.9% intravenous solution: 2000 milliliter(s) intravenous every 8 hours   metFORMIN 500 mg oral tablet: 1 tab(s) orally 2 times a day  Weekly CBC and BUN/Cr : Weekly CBC and BUN/Cr blood draws, fax results to [phone] (Dr. Franklin, infectious disease)

## 2021-05-27 NOTE — DISCHARGE NOTE PROVIDER - CARE PROVIDERS DIRECT ADDRESSES
[email or web address]

## 2021-05-27 NOTE — PROGRESS NOTE ADULT - SUBJECTIVE AND OBJECTIVE BOX
TSERING SCANLON 65y MRN-[id number]    Patient is a 65y old  Male who presents with a chief complaint of hydronephrosis (27 May 2021 09:50)      Follow Up/CC:  ID following for MSSA    Interval History/ROS:    Allergies    No Known Allergies    Intolerances        ANTIMICROBIALS:  ceFAZolin   IVPB 2000 every 8 hours      MEDICATIONS  (STANDING):  ceFAZolin   IVPB 2000 milliGRAM(s) IV Intermittent every 8 hours  dextrose 40% Gel 15 Gram(s) Oral once  dextrose 5%. 1000 milliLiter(s) (50 mL/Hr) IV Continuous <Continuous>  dextrose 5%. 1000 milliLiter(s) (100 mL/Hr) IV Continuous <Continuous>  dextrose 50% Injectable 25 Gram(s) IV Push once  dextrose 50% Injectable 12.5 Gram(s) IV Push once  dextrose 50% Injectable 25 Gram(s) IV Push once  glucagon  Injectable 1 milliGRAM(s) IntraMuscular once  insulin lispro (ADMELOG) corrective regimen sliding scale   SubCutaneous three times a day before meals  insulin lispro (ADMELOG) corrective regimen sliding scale   SubCutaneous at bedtime  polyethylene glycol 3350 17 Gram(s) Oral daily  sodium chloride 0.9%. 1000 milliLiter(s) (100 mL/Hr) IV Continuous <Continuous>    MEDICATIONS  (PRN):        Vital Signs Last 24 Hrs  T(C): 36.6 (27 May 2021 14:05), Max: 36.8 (27 May 2021 05:49)  T(F): 97.8 (27 May 2021 14:05), Max: 98.3 (27 May 2021 09:13)  HR: 81 (27 May 2021 14:05) (77 - 96)  BP: 159/84 (27 May 2021 14:05) (120/75 - 159/84)  BP(mean): --  RR: 18 (27 May 2021 14:05) (18 - 20)  SpO2: 95% (27 May 2021 14:05) (92% - 96%)    CBC Full  -  ( 27 May 2021 07:02 )  WBC Count : 11.76 K/uL  RBC Count : 4.36 M/uL  Hemoglobin : 11.7 g/dL  Hematocrit : 36.9 %  Platelet Count - Automated : 320 K/uL  Mean Cell Volume : 84.6 fl  Mean Cell Hemoglobin : 26.8 pg  Mean Cell Hemoglobin Concentration : 31.7 gm/dL  Auto Neutrophil # : 8.75 K/uL  Auto Lymphocyte # : 1.52 K/uL  Auto Monocyte # : 0.83 K/uL  Auto Eosinophil # : 0.25 K/uL  Auto Basophil # : 0.06 K/uL  Auto Neutrophil % : 74.4 %  Auto Lymphocyte % : 12.9 %  Auto Monocyte % : 7.1 %  Auto Eosinophil % : 2.1 %  Auto Basophil % : 0.5 %    05-27    134<L>  |  98  |  14  ----------------------------<  182<H>  3.7   |  22  |  1.51<H>    Ca    8.5      27 May 2021 07:00  Phos  3.5     05-27  Mg     1.7     05-27    TPro  7.1  /  Alb  2.6<L>  /  TBili  0.4  /  DBili  x   /  AST  35  /  ALT  30  /  AlkPhos  99  05-27    LIVER FUNCTIONS - ( 27 May 2021 07:00 )  Alb: 2.6 g/dL / Pro: 7.1 g/dL / ALK PHOS: 99 U/L / ALT: 30 U/L / AST: 35 U/L / GGT: x               MICROBIOLOGY:  .Blood Blood-Peripheral  05-24-21   No growth to date.  --  --      .Urine Nephrostomy - Left  05-23-21   >100,000 CFU/ml Staphylococcus aureus  --  Staphylococcus aureus      .Blood Blood-Peripheral  05-23-21   Growth in aerobic bottle: Staphylococcus aureus  Please note change in PCR report:  CNS= not detected  ***Blood Panel PCR results on this specimen are available  approximately 3 hours after the Gram stain result.***  Gram stain, PCR, and/or cultureresults may not always  correspond due to difference in methodologies.  ************************************************************  This PCR assay was performed by multiplex PCR. This  Assay tests for 66 bacterial and resistance gene targets.  Please refer to the [employer] test directory  at [email or web address] for details.  --  Blood Culture PCR  Gram Positive Cocci in Clusters      .Urine Clean Catch (Midstream)  05-23-21   >100,000 CFU/ml Staphylococcus aureus  --  Staphylococcus aureus              v            RADIOLOGY     TSERING SCANLON 65y [id number]    Patient is a 65y old  Male who presents with a chief complaint of hydronephrosis (27 May 2021 09:50)      Follow Up/CC:  ID following for MSSA    Interval History/ROS: no fever    Allergies    No Known Allergies    Intolerances        ANTIMICROBIALS:  ceFAZolin   IVPB 2000 every 8 hours      MEDICATIONS  (STANDING):  ceFAZolin   IVPB 2000 milliGRAM(s) IV Intermittent every 8 hours  dextrose 40% Gel 15 Gram(s) Oral once  dextrose 5%. 1000 milliLiter(s) (50 mL/Hr) IV Continuous <Continuous>  dextrose 5%. 1000 milliLiter(s) (100 mL/Hr) IV Continuous <Continuous>  dextrose 50% Injectable 25 Gram(s) IV Push once  dextrose 50% Injectable 12.5 Gram(s) IV Push once  dextrose 50% Injectable 25 Gram(s) IV Push once  glucagon  Injectable 1 milliGRAM(s) IntraMuscular once  insulin lispro (ADMELOG) corrective regimen sliding scale   SubCutaneous three times a day before meals  insulin lispro (ADMELOG) corrective regimen sliding scale   SubCutaneous at bedtime  polyethylene glycol 3350 17 Gram(s) Oral daily  sodium chloride 0.9%. 1000 milliLiter(s) (100 mL/Hr) IV Continuous <Continuous>    MEDICATIONS  (PRN):        Vital Signs Last 24 Hrs  T(C): 36.6 (27 May 2021 14:05), Max: 36.8 (27 May 2021 05:49)  T(F): 97.8 (27 May 2021 14:05), Max: 98.3 (27 May 2021 09:13)  HR: 81 (27 May 2021 14:05) (77 - 96)  BP: 159/84 (27 May 2021 14:05) (120/75 - 159/84)  BP(mean): --  RR: 18 (27 May 2021 14:05) (18 - 20)  SpO2: 95% (27 May 2021 14:05) (92% - 96%)    CBC Full  -  ( 27 May 2021 07:02 )  WBC Count : 11.76 K/uL  RBC Count : 4.36 M/uL  Hemoglobin : 11.7 g/dL  Hematocrit : 36.9 %  Platelet Count - Automated : 320 K/uL  Mean Cell Volume : 84.6 fl  Mean Cell Hemoglobin : 26.8 pg  Mean Cell Hemoglobin Concentration : 31.7 gm/dL  Auto Neutrophil # : 8.75 K/uL  Auto Lymphocyte # : 1.52 K/uL  Auto Monocyte # : 0.83 K/uL  Auto Eosinophil # : 0.25 K/uL  Auto Basophil # : 0.06 K/uL  Auto Neutrophil % : 74.4 %  Auto Lymphocyte % : 12.9 %  Auto Monocyte % : 7.1 %  Auto Eosinophil % : 2.1 %  Auto Basophil % : 0.5 %    05-27    134<L>  |  98  |  14  ----------------------------<  182<H>  3.7   |  22  |  1.51<H>    Ca    8.5      27 May 2021 07:00  Phos  3.5     05-27  Mg     1.7     05-27    TPro  7.1  /  Alb  2.6<L>  /  TBili  0.4  /  DBili  x   /  AST  35  /  ALT  30  /  AlkPhos  99  05-27    LIVER FUNCTIONS - ( 27 May 2021 07:00 )  Alb: 2.6 g/dL / Pro: 7.1 g/dL / ALK PHOS: 99 U/L / ALT: 30 U/L / AST: 35 U/L / GGT: x               MICROBIOLOGY:  .Blood Blood-Peripheral  05-24-21   No growth to date.  --  --      .Urine Nephrostomy - Left  05-23-21   >100,000 CFU/ml Staphylococcus aureus  --  Staphylococcus aureus      .Blood Blood-Peripheral  05-23-21   Growth in aerobic bottle: Staphylococcus aureus  Please note change in PCR report:  CNS= not detected  ***Blood Panel PCR results on this specimen are available  approximately 3 hours after the Gram stain result.***  Gram stain, PCR, and/or cultureresults may not always  correspond due to difference in methodologies.  ************************************************************  This PCR assay was performed by multiplex PCR. This  Assay tests for 66 bacterial and resistance gene targets.  Please refer to the [facility] [employer] test directory  at [email or web address] for details.  --  Blood Culture PCR  Gram Positive Cocci in Clusters      .Urine Clean Catch (Midstream)  05-23-21   >100,000 CFU/ml Staphylococcus aureus  --  Staphylococcus aureus              v            RADIOLOGY     TSERING SCANLON 65y [id number]    Patient is a 65y old  Male who presents with a chief complaint of hydronephrosis (27 May 2021 09:50)      Follow Up/CC:  ID following for MSSA    Interval History/ROS: no fever, feels ok     Allergies    No Known Allergies    Intolerances        ANTIMICROBIALS:  ceFAZolin   IVPB 2000 every 8 hours      MEDICATIONS  (STANDING):  ceFAZolin   IVPB 2000 milliGRAM(s) IV Intermittent every 8 hours  dextrose 40% Gel 15 Gram(s) Oral once  dextrose 5%. 1000 milliLiter(s) (50 mL/Hr) IV Continuous <Continuous>  dextrose 5%. 1000 milliLiter(s) (100 mL/Hr) IV Continuous <Continuous>  dextrose 50% Injectable 25 Gram(s) IV Push once  dextrose 50% Injectable 12.5 Gram(s) IV Push once  dextrose 50% Injectable 25 Gram(s) IV Push once  glucagon  Injectable 1 milliGRAM(s) IntraMuscular once  insulin lispro (ADMELOG) corrective regimen sliding scale   SubCutaneous three times a day before meals  insulin lispro (ADMELOG) corrective regimen sliding scale   SubCutaneous at bedtime  polyethylene glycol 3350 17 Gram(s) Oral daily  sodium chloride 0.9%. 1000 milliLiter(s) (100 mL/Hr) IV Continuous <Continuous>    MEDICATIONS  (PRN):        Vital Signs Last 24 Hrs  T(C): 36.6 (27 May 2021 14:05), Max: 36.8 (27 May 2021 05:49)  T(F): 97.8 (27 May 2021 14:05), Max: 98.3 (27 May 2021 09:13)  HR: 81 (27 May 2021 14:05) (77 - 96)  BP: 159/84 (27 May 2021 14:05) (120/75 - 159/84)  BP(mean): --  RR: 18 (27 May 2021 14:05) (18 - 20)  SpO2: 95% (27 May 2021 14:05) (92% - 96%)    CBC Full  -  ( 27 May 2021 07:02 )  WBC Count : 11.76 K/uL  RBC Count : 4.36 M/uL  Hemoglobin : 11.7 g/dL  Hematocrit : 36.9 %  Platelet Count - Automated : 320 K/uL  Mean Cell Volume : 84.6 fl  Mean Cell Hemoglobin : 26.8 pg  Mean Cell Hemoglobin Concentration : 31.7 gm/dL  Auto Neutrophil # : 8.75 K/uL  Auto Lymphocyte # : 1.52 K/uL  Auto Monocyte # : 0.83 K/uL  Auto Eosinophil # : 0.25 K/uL  Auto Basophil # : 0.06 K/uL  Auto Neutrophil % : 74.4 %  Auto Lymphocyte % : 12.9 %  Auto Monocyte % : 7.1 %  Auto Eosinophil % : 2.1 %  Auto Basophil % : 0.5 %    05-27    134<L>  |  98  |  14  ----------------------------<  182<H>  3.7   |  22  |  1.51<H>    Ca    8.5      27 May 2021 07:00  Phos  3.5     05-27  Mg     1.7     05-27    TPro  7.1  /  Alb  2.6<L>  /  TBili  0.4  /  DBili  x   /  AST  35  /  ALT  30  /  AlkPhos  99  05-27    LIVER FUNCTIONS - ( 27 May 2021 07:00 )  Alb: 2.6 g/dL / Pro: 7.1 g/dL / ALK PHOS: 99 U/L / ALT: 30 U/L / AST: 35 U/L / GGT: x               MICROBIOLOGY:  .Blood Blood-Peripheral  05-24-21   No growth to date.  --  --      .Urine Nephrostomy - Left  05-23-21   >100,000 CFU/ml Staphylococcus aureus  --  Staphylococcus aureus      .Blood Blood-Peripheral  05-23-21   Growth in aerobic bottle: Staphylococcus aureus  Please note change in PCR report:  CNS= not detected  ***Blood Panel PCR results on this specimen are available  approximately 3 hours after the Gram stain result.***  Gram stain, PCR, and/or cultureresults may not always  correspond due to difference in methodologies.  ************************************************************  This PCR assay was performed by multiplex PCR. This  Assay tests for 66 bacterial and resistance gene targets.  Please refer to the [employer] test directory  at [email or web address] for details.  --  Blood Culture PCR  Gram Positive Cocci in Clusters      .Urine Clean Catch (Midstream)  05-23-21   >100,000 CFU/ml Staphylococcus aureus  --  Staphylococcus aureus              v            RADIOLOGY

## 2021-05-27 NOTE — DISCHARGE NOTE PROVIDER - HOSPITAL COURSE
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 Ct imaging showed L hydronephrosis and incidental pancreatic mass and splenic vein thrombosis. Patient was taken for urgent NT placement with IR. Pt was started on zosyn now switched to ancef after getting UCx growing S. aureus. One tube of BCx also growing MSSA so patient to c/w ancef for 4 weeks and will continue with outpatient abx. PICC line placed in right arm by PICC team nurses. Repeat renal US showed decrease of the hydronephrosis. Pancreatic mass was worked up. CEA was 37.7 and CA 19-9  was 1577. MRCP showing 5 x 4cm pancreatic body mass encasing the splenic artery and left adrenal lesion, concern for pancreatic adenocarcinoma. GI was able to perform an endoscopic ultrasound and biopsy the lesion. surgery onc was consulted and will follow up with patient in outpatient setting following pathology results. Pt also underwent CT chest and head which did not show evidence of metastatic disease. His splenic vein thrombosis was initially treated with full AC heparin but was stopped due to the asymptomatic nature and per GI/IR recs. Patients course was c/b ELIESER most likely 2/2 contrast. Patients Cr starting to improve but will need follow up labwork and visit to nephrologist/PCP.     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 Ct imaging showed L hydronephrosis and incidental pancreatic mass and splenic vein thrombosis. Patient was taken for urgent NT placement with IR. Pt was started on zosyn now switched to ancef after getting UCx growing S. aureus. One tube of BCx also growing MSSA so patient to c/w ancef for 4 weeks and will continue with outpatient abx. PICC line placed in right arm by PICC team nurses. Repeat renal US showed decrease of the hydronephrosis. Pancreatic mass was worked up. CEA was 37.7 and CA 19-9  was 1577. MRCP showing 5 x 4cm pancreatic body mass encasing the splenic artery and left adrenal lesion, concern for pancreatic adenocarcinoma. GI was able to perform an endoscopic ultrasound and biopsy the lesion. surgery onc was consulted and will follow up with patient in outpatient setting following pathology results. Pt also underwent CT chest and head which did not show evidence of metastatic disease. His splenic vein thrombosis was initially treated with full AC heparin but was stopped due to the asymptomatic nature and per GI/IR recs. Patients course was c/b ELIESER most likely 2/2 contrast. Patients Cr starting stabilized but will need follow up labwork and visit to nephrologist/PCP (evaluated by nephrology inpatient).     patient medically optimized for discharge

## 2021-05-27 NOTE — PROGRESS NOTE ADULT - PROBLEM SELECTOR PLAN 3
-from UTI  -cont ancef  -repeat bcx negative  -midline/picc line  -weekly cbc, BUN/creatinine - fax [phone]

## 2021-05-27 NOTE — PROGRESS NOTE ADULT - PROBLEM SELECTOR PLAN 2
Meets SIRs criteria, tachy with elevated WBC, given 2-3L fluid in ED  - lactate went from 3--> 1.5   - given ceftriaxone in ED   - UA positive for WBC, LE  - s/p zosyn   - UCx growing s. aureus, no prior instrumentation noted  - BCx growing MSSA  - repeat BCx NGTD  - c/w ancf 2g q8 per ID recs, will likely need for extended time   - TTE showing no vegetation   - RN to give PICC line for home abx

## 2021-05-27 NOTE — PROGRESS NOTE ADULT - PROBLEM SELECTOR PLAN 4
Incidental finding of pancreatic mass on Ct, in setting of weight loss   -  benign vs malignant - cyst vs adenocarcinoma vs lymphoma vs NET  - MRCP showing tumor without involvement of vessels but with encasement of splenic artery   - GI recs appreciated   -  and CEA elevated   - surg onc recs appreciated   - CT chest with contrast and head without contrast to evaluate for workup, found to have adrenal mass, bone islands and left thyroid enlargement--> will send for TSH/free T4 for AM labs tomorrow to further assess  - s/p EUS/ bx with adv GI

## 2021-05-27 NOTE — PROGRESS NOTE ADULT - SUBJECTIVE AND OBJECTIVE BOX
PROGRESS NOTE:   Authored by Dr. Nancy Bolden, MARLA pager [phone]    Patient is a 65y old  Male who presents with a chief complaint of hydronephrosis (27 May 2021 09:36)      SUBJECTIVE / OVERNIGHT EVENTS: Patient examined at bedside, he appeared well and denied any fevers, chills, SOB, chest pain, abd pain, issues or burning with urination, bloating, n/v/d/c.      MEDICATIONS  (STANDING):  ceFAZolin   IVPB 2000 milliGRAM(s) IV Intermittent every 8 hours  dextrose 40% Gel 15 Gram(s) Oral once  dextrose 5%. 1000 milliLiter(s) (50 mL/Hr) IV Continuous <Continuous>  dextrose 5%. 1000 milliLiter(s) (100 mL/Hr) IV Continuous <Continuous>  dextrose 50% Injectable 25 Gram(s) IV Push once  dextrose 50% Injectable 12.5 Gram(s) IV Push once  dextrose 50% Injectable 25 Gram(s) IV Push once  glucagon  Injectable 1 milliGRAM(s) IntraMuscular once  insulin lispro (ADMELOG) corrective regimen sliding scale   SubCutaneous three times a day before meals  insulin lispro (ADMELOG) corrective regimen sliding scale   SubCutaneous at bedtime  polyethylene glycol 3350 17 Gram(s) Oral daily  sodium chloride 0.9%. 1000 milliLiter(s) (100 mL/Hr) IV Continuous <Continuous>    MEDICATIONS  (PRN):      CAPILLARY BLOOD GLUCOSE      POCT Blood Glucose.: 150 mg/dL (27 May 2021 08:26)  POCT Blood Glucose.: 177 mg/dL (26 May 2021 21:09)  POCT Blood Glucose.: 142 mg/dL (26 May 2021 19:17)  POCT Blood Glucose.: 148 mg/dL (26 May 2021 12:26)    I&O's Summary    26 May 2021 07:01  -  27 May 2021 07:00  --------------------------------------------------------  IN: 0 mL / OUT: 900 mL / NET: -900 mL        PHYSICAL EXAM:  Vital Signs Last 24 Hrs  T(C): 36.8 (27 May 2021 09:13), Max: 36.8 (27 May 2021 05:49)  T(F): 98.3 (27 May 2021 09:13), Max: 98.3 (27 May 2021 09:13)  HR: 77 (27 May 2021 09:13) (77 - 96)  BP: 144/80 (27 May 2021 09:13) (120/75 - 145/84)  BP(mean): --  RR: 18 (27 May 2021 09:13) (18 - 20)  SpO2: 93% (27 May 2021 09:13) (92% - 96%)    GENERAL: No acute distress, well-developed  HEAD:  Atraumatic, Normocephalic  EYES: EOMI, PERRLA, conjunctiva and sclera clear  NECK: Supple, no lymphadenopathy, no JVD  CHEST/LUNG: CTAB; No wheezes, rales, or rhonchi  HEART: Regular rate and rhythm; No murmurs, rubs, or gallops  ABDOMEN: Soft, non-tender, non-distended; normal bowel sounds, no organomegaly, left nephrostomy tube with good output of yellow urine   EXTREMITIES:  2+ peripheral pulses b/l, No clubbing, cyanosis, or edema  NEUROLOGY: A&O x 3, no focal deficits  SKIN: No rashes or lesions    LABS:                        11.7   11.76 )-----------( 320      ( 27 May 2021 07:02 )             36.9     05-27    134<L>  |  98  |  14  ----------------------------<  182<H>  3.7   |  22  |  1.51<H>    Ca    8.5      27 May 2021 07:00  Phos  3.5     05-27  Mg     1.7     05-27    TPro  7.1  /  Alb  2.6<L>  /  TBili  0.4  /  DBili  x   /  AST  35  /  ALT  30  /  AlkPhos  99  05-27    PTT - ( 26 May 2021 07:06 )  PTT:39.0 sec          Culture - Blood (collected 24 May 2021 17:26)  Source: .Blood Blood-Peripheral  Preliminary Report (25 May 2021 18:01):    No growth to date.    Culture - Blood (collected 24 May 2021 17:26)  Source: .Blood Blood-Peripheral  Preliminary Report (25 May 2021 18:01):    No growth to date.        RADIOLOGY & ADDITIONAL TESTS:  Results Reviewed:   Imaging Personally Reviewed:  Electrocardiogram Personally Reviewed:    COORDINATION OF CARE:  Care Discussed with Consultants/Other Providers [Y/N]:  Prior or Outpatient Records Reviewed [Y/N]:

## 2021-05-27 NOTE — DISCHARGE NOTE PROVIDER - NSDCHHNEEDSERVICE_GEN_ALL_CORE
Medication teaching and assessment/Wound care and assessment Catheter insertion/Medication teaching and assessment/Teaching and training/Wound care and assessment

## 2021-05-27 NOTE — DISCHARGE NOTE PROVIDER - CARE PROVIDER_API CALL
Roberto Franklin; MBBS)  Infectious Disease; Internal Medicine  [address] [place] [address]  Phone: [phone]  Fax: [phone]  Established Patient  Follow Up Time: 2 weeks    Raudel Aquino)  Complex General Surgical Oncology; Surgery; Surgical Oncology  [address] [place] [address]  Phone: [phone]  Fax: [phone]  Established Patient  Follow Up Time: 1 week

## 2021-05-27 NOTE — PROGRESS NOTE ADULT - ASSESSMENT
65 year old male with pmhx of DM on metformin and nephrolithiasis (urologist in [place]) presents to ED with left lower quadrant pain x 20 days, worsened 1 week ago and decreased urinary output found to have an obstructing stone causing L hydronephrosis taken for urgent NT placement with Ir and likely pyelonephritis started on zosyn now switched to ancef after getting UCx growing S. aureus

## 2021-05-27 NOTE — DISCHARGE NOTE PROVIDER - NSFOLLOWUPCLINICS_GEN_ALL_ED_FT
Gastroenterology at [facility]  Gastroenterology  [address] [place] [address]  Phone: [phone]  Fax:   Follow Up Time: 1 week    [facility]  Nephrology  [address] [place] [address]  Phone: [phone]  Fax:   Follow Up Time: 2 weeks    [facility] - Urology  Urology  [address], 3rd & [address]th [place] [address]  Phone: [phone]  Fax:   Follow Up Time: 1 month

## 2021-05-27 NOTE — PROGRESS NOTE ADULT - ASSESSMENT
65 year old male with pmhx of DM on metformin and nephrolithiasis presents to ED with urosepsis secondary to left nephrolithiases s/p IR placement of percutaneous nephrostomy tube, who incidentally found to have pancreatic body mass, CEA 37.7 and CA 19-9 1577, MRCP showing 5 x 4cm pancreatic body mass encasing the splenic artery and left adrenal lesion, concern for pancreatic adenocarcinoma.    PLAN:    - Will follow up EUS/tissue pathology  - Follow up with Dr. Aquino for surgical planning    Guerrero Saavedra MD  General Surgery, PGY 2

## 2021-05-28 ENCOUNTER — TRANSCRIPTION ENCOUNTER (OUTPATIENT)
Age: 66
End: 2021-05-28

## 2021-05-28 VITALS
HEART RATE: 68 BPM | DIASTOLIC BLOOD PRESSURE: 84 MMHG | SYSTOLIC BLOOD PRESSURE: 134 MMHG | TEMPERATURE: 98 F | OXYGEN SATURATION: 98 %

## 2021-05-28 PROBLEM — Z00.00 ENCOUNTER FOR PREVENTIVE HEALTH EXAMINATION: Status: ACTIVE | Noted: 2021-05-28

## 2021-05-28 LAB
ALBUMIN SERPL ELPH-MCNC: 3.1 G/DL — LOW (ref 3.3–5)
ALP SERPL-CCNC: 88 U/L — SIGNIFICANT CHANGE UP (ref 40–120)
ALT FLD-CCNC: 21 U/L — SIGNIFICANT CHANGE UP (ref 10–45)
ANION GAP SERPL CALC-SCNC: 15 MMOL/L — SIGNIFICANT CHANGE UP (ref 5–17)
AST SERPL-CCNC: 34 U/L — SIGNIFICANT CHANGE UP (ref 10–40)
BASOPHILS # BLD AUTO: 0.03 K/UL — SIGNIFICANT CHANGE UP (ref 0–0.2)
BASOPHILS NFR BLD AUTO: 0.3 % — SIGNIFICANT CHANGE UP (ref 0–2)
BILIRUB SERPL-MCNC: 0.5 MG/DL — SIGNIFICANT CHANGE UP (ref 0.2–1.2)
BUN SERPL-MCNC: 13 MG/DL — SIGNIFICANT CHANGE UP (ref 7–23)
CALCIUM SERPL-MCNC: 8.7 MG/DL — SIGNIFICANT CHANGE UP (ref 8.4–10.5)
CHLORIDE SERPL-SCNC: 100 MMOL/L — SIGNIFICANT CHANGE UP (ref 96–108)
CO2 SERPL-SCNC: 21 MMOL/L — LOW (ref 22–31)
CREAT SERPL-MCNC: 1.43 MG/DL — HIGH (ref 0.5–1.3)
CULTURE RESULTS: SIGNIFICANT CHANGE UP
EOSINOPHIL # BLD AUTO: 0.22 K/UL — SIGNIFICANT CHANGE UP (ref 0–0.5)
EOSINOPHIL NFR BLD AUTO: 2.2 % — SIGNIFICANT CHANGE UP (ref 0–6)
GLUCOSE BLDC GLUCOMTR-MCNC: 139 MG/DL — HIGH (ref 70–99)
GLUCOSE BLDC GLUCOMTR-MCNC: 168 MG/DL — HIGH (ref 70–99)
GLUCOSE SERPL-MCNC: 172 MG/DL — HIGH (ref 70–99)
HCT VFR BLD CALC: 35.4 % — LOW (ref 39–50)
HGB BLD-MCNC: 11.4 G/DL — LOW (ref 13–17)
IMM GRANULOCYTES NFR BLD AUTO: 2.3 % — HIGH (ref 0–1.5)
LYMPHOCYTES # BLD AUTO: 1.49 K/UL — SIGNIFICANT CHANGE UP (ref 1–3.3)
LYMPHOCYTES # BLD AUTO: 14.7 % — SIGNIFICANT CHANGE UP (ref 13–44)
MAGNESIUM SERPL-MCNC: 1.6 MG/DL — SIGNIFICANT CHANGE UP (ref 1.6–2.6)
MCHC RBC-ENTMCNC: 27 PG — SIGNIFICANT CHANGE UP (ref 27–34)
MCHC RBC-ENTMCNC: 32.2 GM/DL — SIGNIFICANT CHANGE UP (ref 32–36)
MCV RBC AUTO: 83.7 FL — SIGNIFICANT CHANGE UP (ref 80–100)
MONOCYTES # BLD AUTO: 0.79 K/UL — SIGNIFICANT CHANGE UP (ref 0–0.9)
MONOCYTES NFR BLD AUTO: 7.8 % — SIGNIFICANT CHANGE UP (ref 2–14)
NEUTROPHILS # BLD AUTO: 7.41 K/UL — HIGH (ref 1.8–7.4)
NEUTROPHILS NFR BLD AUTO: 72.7 % — SIGNIFICANT CHANGE UP (ref 43–77)
NRBC # BLD: 0 /100 WBCS — SIGNIFICANT CHANGE UP (ref 0–0)
PHOSPHATE SERPL-MCNC: 2.8 MG/DL — SIGNIFICANT CHANGE UP (ref 2.5–4.5)
PLATELET # BLD AUTO: 296 K/UL — SIGNIFICANT CHANGE UP (ref 150–400)
POTASSIUM SERPL-MCNC: 3.7 MMOL/L — SIGNIFICANT CHANGE UP (ref 3.5–5.3)
POTASSIUM SERPL-SCNC: 3.7 MMOL/L — SIGNIFICANT CHANGE UP (ref 3.5–5.3)
PROT SERPL-MCNC: 7.6 G/DL — SIGNIFICANT CHANGE UP (ref 6–8.3)
RBC # BLD: 4.23 M/UL — SIGNIFICANT CHANGE UP (ref 4.2–5.8)
RBC # FLD: 14.7 % — HIGH (ref 10.3–14.5)
SARS-COV-2 RNA SPEC QL NAA+PROBE: SIGNIFICANT CHANGE UP
SODIUM SERPL-SCNC: 136 MMOL/L — SIGNIFICANT CHANGE UP (ref 135–145)
SPECIMEN SOURCE: SIGNIFICANT CHANGE UP
WBC # BLD: 10.17 K/UL — SIGNIFICANT CHANGE UP (ref 3.8–10.5)
WBC # FLD AUTO: 10.17 K/UL — SIGNIFICANT CHANGE UP (ref 3.8–10.5)

## 2021-05-28 PROCEDURE — 99232 SBSQ HOSP IP/OBS MODERATE 35: CPT

## 2021-05-28 PROCEDURE — 99239 HOSP IP/OBS DSCHRG MGMT >30: CPT

## 2021-05-28 PROCEDURE — 99222 1ST HOSP IP/OBS MODERATE 55: CPT | Mod: GC

## 2021-05-28 RX ADMIN — Medication 100 MILLIGRAM(S): at 15:07

## 2021-05-28 RX ADMIN — HEPARIN SODIUM 5000 UNIT(S): 5000 INJECTION INTRAVENOUS; SUBCUTANEOUS at 06:08

## 2021-05-28 RX ADMIN — Medication 100 MILLIGRAM(S): at 06:08

## 2021-05-28 RX ADMIN — HEPARIN SODIUM 5000 UNIT(S): 5000 INJECTION INTRAVENOUS; SUBCUTANEOUS at 13:38

## 2021-05-28 RX ADMIN — Medication 1: at 13:38

## 2021-05-28 NOTE — PROGRESS NOTE ADULT - PROBLEM SELECTOR PROBLEM 4
Pancreatic mass
Pancreatic mass
Splenic vein thrombosis
Splenic vein thrombosis
Pancreatic mass
Acute pyelonephritis

## 2021-05-28 NOTE — PROGRESS NOTE ADULT - PROBLEM SELECTOR PLAN 3
-from UTI  -cont ancef  -repeat bcx negative  -midline/picc line  -weekly cbc, BUN/creatinine - fax [phone]  -f/u in 1 month in ID office [phone]

## 2021-05-28 NOTE — DISCHARGE NOTE NURSING/CASE MANAGEMENT/SOCIAL WORK - NSSCNAMETXT_GEN_ALL_CORE
[facility] [place]- Visiting nurse for drain care teaching and reinforcement   [facility]- visiting nurse for IV antibiotics- [phone]

## 2021-05-28 NOTE — CONSULT NOTE ADULT - ATTENDING COMMENTS
Feeling improved, nephrostomy with free flowing urine, not obvious bloody  1.  ARF--2+ radiocontrast.  NON oliguric, no HD required  2.  Hydronephrosis--obstructing stone with PCN.  Appears [patent  3.  Nephrolithiasis--outpt stone workup, rx    discussed with med team
Impression:    #1. Consulted for 3 cm pancreatic body mass with CA 19-9 greater than 1000 and splenic vein thrombosis    #2. Admitted for urosepsis secondary to obstructing ureter stone, s/p drainage.    Recommendations:    #1.  MRI/MRCP to further characterize mass    #2.  Eventual endoscopic ultrasound with fine-needle biopsy of the pancreatic mass after urosepsis resolves.  Plan to perform as an inpatient to expedite care    #3. Management of urosepsis by antibiotics, status post percutaneous drainage.

## 2021-05-28 NOTE — CONSULT NOTE ADULT - CONSULT REASON
2cm left proximal ureteral stone with severe hydro
ELIESER
pancreatic mass
Pancreatic mass
Septic nephrotlithiasis
UTI

## 2021-05-28 NOTE — CONSULT NOTE ADULT - CONSULT REQUESTED DATE/TIME
23-May-2021 16:58
28-May-2021 09:00
22-May-2021 23:33
23-May-2021 11:28
25-May-2021 15:09
25-May-2021 13:04

## 2021-05-28 NOTE — DISCHARGE NOTE NURSING/CASE MANAGEMENT/SOCIAL WORK - PATIENT PORTAL LINK FT
You can access the FollowMyHealth Patient Portal offered by [facility] by registering at the following website: [email or web address]. By joining [facility]’s FollowMyHealth portal, you will also be able to view your health information using other applications (apps) compatible with our system.

## 2021-05-28 NOTE — PROGRESS NOTE ADULT - REASON FOR ADMISSION
hydronephrosis

## 2021-05-28 NOTE — PROGRESS NOTE ADULT - NSICDXPILOT_GEN_ALL_CORE
[place]

## 2021-05-28 NOTE — PROGRESS NOTE ADULT - ASSESSMENT
65 year old male with pmhx of DM on metformin and nephrolithiasis (urologist in [place]) presents to ED with left lower quadrant pain x 20 days, worsened 1 week ago and decreased urinary output, now s/p left PCN with MSSA bacteremia, UTI/pyelonephritis, leukocytosis, sepsis    Roberto Franklin  Attending Physician   Division of Infectious Disease  Pager #[phone]  Available on [employer] Teams also  After 5pm/weekend or no response, call #[phone]    D/w son at bedside

## 2021-05-28 NOTE — CONSULT NOTE ADULT - ASSESSMENT
65y M with nephrolithiasis admitted with pyleo, MSSA bacteremia and L kidney stone s/p PCN. Now with ELIESER    #ELIESER 65y M with nephrolithiasis admitted with pyleo, MSSA bacteremia and L kidney stone s/p PCN. Now with ELIESER    #ELIESER  - Likely multifactorial in setting of sepsis, MSSA bacteremia, hydronephrosis and recent contrast  - Scr stable at 1.4mg/dl, baseline ~.8-1mg/dl  - No objection for discharge from the nephrology standpoint. Patient needs repeat labs early next week with his PMD to assess his renal fxn. Can follow up outpt with Nephrology at [address] with the next 2 weeks. Please provide the patient with the contact information on his discharge paperwork

## 2021-05-28 NOTE — PROGRESS NOTE ADULT - ASSESSMENT
65 year old male with pmhx of DM on metformin and nephrolithiasis (urologist in [place]) presents to ED with left lower quadrant pain x 20 days, worsened 1 week ago and decreased urinary output found to have an obstructing stone causing L hydronephrosis taken for urgent NT placement with Ir and likely pyelonephritis started on zosyn now switched to ancef after getting UCx growing S. aureus

## 2021-05-28 NOTE — PROGRESS NOTE ADULT - PROBLEM SELECTOR PROBLEM 6
Verified Results  CT SINUSES WO CON [id number] 07:53AM KATHLEEN JOYNER   Ordering Provider: KATHLEEN JOYNER.    Reason For Study: _,_.     Test Name Result Flag Reference   CT SINUSES WO CON (Report)     Accession #    [id number]    EXAM: CT sinuses without contrast.    CLINICAL INDICATION: 52-year-old female with allergic rhinitis.    COMPARISON: None    TECHNIQUE: The study was acquired in direct coronal and axial planes with 2 mm section thickness.    FINDINGS: There is no evidence of mucosal thickening, air-fluid levels or aggressive features.   Bilateral Crissy cells.    There is minimal left-to-right deviation of the nasal septum.    The orbital structures appear symmetric and unremarkable.    Dystrophic calcification anterior to the adenoids. The mucosal outlines of the nasopharynx as   visualized appear unremarkable.    IMPRESSION:    Bilateral Crissy cells. Minimal left to right deviation of the nasal septum. Punctate   calcification anterior to the adenoids, left of midline. No mucosal thickening air-fluid   levels, or aggressive features.    IHUMZA MD, have reviewed the images and report and concur with these findings   interpreted by ANSHU PARISH MD.    **** F I N A L ****    Transcribed By: JED   12/22/17 9:03 am    Dictated By:      ANSHU TARANGO    Electronically Reviewed and Approved By:      JENNIFER, DUSTY HARDING 12/22/17 10:38 am       
Diabetes
Preventative health care
Preventative health care
Diabetes
Diabetes

## 2021-05-28 NOTE — PROGRESS NOTE ADULT - GASTROINTESTINAL DETAILS
soft/nontender/no distention/no guarding/no rigidity
soft/nontender/no distention/no guarding/no rigidity
soft/nontender/no distention/no rebound tenderness/no guarding

## 2021-05-28 NOTE — PROGRESS NOTE ADULT - PROVIDER SPECIALTY LIST ADULT
Gastroenterology
Intervent Radiology
Infectious Disease
Intervent Radiology
Gastroenterology
Intervent Radiology
Surgery
Intervent Radiology
Gastroenterology
Surgery
Internal Medicine
Internal Medicine
Infectious Disease
Internal Medicine
Infectious Disease

## 2021-05-28 NOTE — PROGRESS NOTE ADULT - ATTENDING COMMENTS
discussed above plan with resident on rounds. patient seen and examined. no acute complaints. denies abd pain post procedure    labs and imaging reviewed.  I agree with the above findings, assessment, and plan with the following additions and exceptions:  65M with hx of nephrolithiasis, DM presents with decreased urine output and L flank pain. CTAP with 2cm L proximal ureteral stone with severe hydro and pancreatic mass with splenic vein thrombosis.    # sepsis due to UTI/pyelonephritis found to have severe L hydro 2/2 ureteral stone s/p NT by IR, UCx with staph aureus (no previous/recent instrumentation). bcx 1/4 bottles CNS - suspect contaminant but now with MSSA bacteremia (1/4 bottles)   repeat bcx 5/24 NTD  initially on vancomycin now changed to ancef s/p picc line  renal us without abscess and improved hydro  TTE without endocarditis  ID f/u appreciated  Urology f/u    #ELIESER - persistent despite fluids - will continue ivf hydration       # pancreatic mass - CEA, Ca19-9 elevated and MRCP highly suspicious for malignancy - EUS with biopsy 5/26. 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      # DM II - a1c 7.9. monitor FS - ISS for coverage    discharge time - 40 minutes

## 2021-05-28 NOTE — PROGRESS NOTE ADULT - NEUROLOGICAL DETAILS
alert and oriented x 3/responds to verbal commands
responds to verbal commands
responds to verbal commands

## 2021-05-28 NOTE — PROGRESS NOTE ADULT - PROBLEM SELECTOR PLAN 5
Found on CT, patient with left upper quad pain for the last 6 months  - holding full AC (heparin drip) for EUS, stopped at 7:30AM on 5/26  - monitor pain   - monitor platelets (270)  - per surg onc, there is low likelihood of recovery of the splenic vein so heparin treatment is not indicated  - per adv GI no need for AC
Found on CT, patient with left upper quad pain for the last 6 months  - holding full AC (heparin drip) for EUS, stopped at 7:30AM on 5/26  - monitor pain   - monitor platelets (270)  - per surg onc, there is low likelihood of recovery of the splenic vein so heparin treatment is not indicated
Found on CT, patient with left upper quad pain for the last 6 months  - holding full AC (heparin drip) for EUS, stopped at 7:30AM on 5/26  - monitor pain   - monitor platelets (270)  - per surg onc, there is low likelihood of recovery of the splenic vein so heparin treatment is not indicated  - per adv GI no need for AC
On metformin at home  -  A1Ca 7.9  - low corrective sliding scale   - FS q6 when NPO
On metformin at home  - f/u A1C  - low corrective sliding scale   - FS q6 when NPO

## 2021-05-28 NOTE — PROGRESS NOTE ADULT - CONSTITUTIONAL DETAILS
Conjuntivae and eyelids appear normal , Sclerae : White without injection
no distress/obese

## 2021-05-28 NOTE — PROGRESS NOTE ADULT - SUBJECTIVE AND OBJECTIVE BOX
Chief Complaint:  Patient is a 65y old  Male who presents with a chief complaint of hydronephrosis (28 May 2021 09:00)    Interval Events:   No acute overnight events    Allergies:  No Known Allergies      Hospital Medications:  ceFAZolin   IVPB 2000 milliGRAM(s) IV Intermittent every 8 hours  dextrose 40% Gel 15 Gram(s) Oral once  dextrose 5%. 1000 milliLiter(s) IV Continuous <Continuous>  dextrose 5%. 1000 milliLiter(s) IV Continuous <Continuous>  dextrose 50% Injectable 25 Gram(s) IV Push once  dextrose 50% Injectable 12.5 Gram(s) IV Push once  dextrose 50% Injectable 25 Gram(s) IV Push once  glucagon  Injectable 1 milliGRAM(s) IntraMuscular once  heparin   Injectable 5000 Unit(s) SubCutaneous every 8 hours  insulin lispro (ADMELOG) corrective regimen sliding scale   SubCutaneous three times a day before meals  insulin lispro (ADMELOG) corrective regimen sliding scale   SubCutaneous at bedtime  polyethylene glycol 3350 17 Gram(s) Oral daily  sodium chloride 0.9%. 1000 milliLiter(s) IV Continuous <Continuous>      PMHX/PSHX:  Diabetes    Kidney stone on left side        ROS:   General:  No fevers, chills or night sweats  ENT:  No sore throat or dysphagia  CV:  No pain or palpitations  Resp:  No dyspnea, cough or  wheezing  GI:  No pain, No nausea, No vomiting, No diarrhea, No rectal bleeding, No tarry stools,  Skin:  No rash or edema    PHYSICAL EXAM:   Vital Signs:  Vital Signs Last 24 Hrs  T(C): 36.6 (28 May 2021 05:08), Max: 36.8 (27 May 2021 20:54)  T(F): 97.8 (28 May 2021 05:08), Max: 98.2 (27 May 2021 20:54)  HR: 83 (28 May 2021 05:08) (81 - 86)  BP: 147/79 (28 May 2021 05:08) (144/76 - 159/84)  BP(mean): --  RR: 18 (28 May 2021 05:08) (18 - 18)  SpO2: 94% (28 May 2021 05:08) (94% - 97%)  Daily     Daily     GENERAL:  NAD, Appears stated age  HEENT:  NC/AT,  conjunctivae clear and pink  CHEST:  Normal Effort, Normal rate  HEART:  RRR, S1 + S2  ABDOMEN:  Soft, non-tender, non-distended, BS+  EXTEREMITIES:  no cyanosis  SKIN:  Warm & Dry.  NEURO:  Alert, oriented    LABS:                        11.4   10.17 )-----------( 296      ( 28 May 2021 06:50 )             35.4     Mean Cell Volume: 83.7 fl (05-28-21 @ 06:50)    05-28    136  |  100  |  13  ----------------------------<  172<H>  3.7   |  21<L>  |  1.43<H>    Ca    8.7      28 May 2021 06:49  Phos  2.8     05-28  Mg     1.6     05-28    TPro  7.6  /  Alb  3.1<L>  /  TBili  0.5  /  DBili  x   /  AST  34  /  ALT  21  /  AlkPhos  88  05-28    LIVER FUNCTIONS - ( 28 May 2021 06:49 )  Alb: 3.1 g/dL / Pro: 7.6 g/dL / ALK PHOS: 88 U/L / ALT: 21 U/L / AST: 34 U/L / GGT: x                                       11.4   10.17 )-----------( 296      ( 28 May 2021 06:50 )             35.4                         11.7   11.76 )-----------( 320      ( 27 May 2021 07:02 )             36.9                         12.6   13.56 )-----------( 322      ( 26 May 2021 07:16 )             37.8       Imaging:

## 2021-05-28 NOTE — PROGRESS NOTE ADULT - PROBLEM SELECTOR PROBLEM 1
Hydronephrosis with obstructing calculus
Sepsis due to methicillin susceptible Staphylococcus aureus

## 2021-05-28 NOTE — PROGRESS NOTE ADULT - ASSESSMENT
65 year old male with pmhx of DM on metformin and nephrolithiasis (urologist in [place]) presents to ED with left lower quadrant pain x 20 days, worsened 1 week ago and decreased urinary output found to have an obstructing stone causing L hydronephrosis taken for urgent NT placement with Ir and pyelonephritis with finding of pancreatic mass on CT.     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

## 2021-05-28 NOTE — PROGRESS NOTE ADULT - SUBJECTIVE AND OBJECTIVE BOX
PROGRESS NOTE:   Authored by Dr. Nancy Bolden, MARLA pager [phone]    Patient is a 65y old  Male who presents with a chief complaint of hydronephrosis (28 May 2021 10:07)      SUBJECTIVE / OVERNIGHT EVENTS: patient examined at bedside. He appeared well and denied any acute complaints     MEDICATIONS  (STANDING):  ceFAZolin   IVPB 2000 milliGRAM(s) IV Intermittent every 8 hours  dextrose 40% Gel 15 Gram(s) Oral once  dextrose 5%. 1000 milliLiter(s) (50 mL/Hr) IV Continuous <Continuous>  dextrose 5%. 1000 milliLiter(s) (100 mL/Hr) IV Continuous <Continuous>  dextrose 50% Injectable 25 Gram(s) IV Push once  dextrose 50% Injectable 12.5 Gram(s) IV Push once  dextrose 50% Injectable 25 Gram(s) IV Push once  glucagon  Injectable 1 milliGRAM(s) IntraMuscular once  heparin   Injectable 5000 Unit(s) SubCutaneous every 8 hours  insulin lispro (ADMELOG) corrective regimen sliding scale   SubCutaneous three times a day before meals  insulin lispro (ADMELOG) corrective regimen sliding scale   SubCutaneous at bedtime  polyethylene glycol 3350 17 Gram(s) Oral daily  sodium chloride 0.9%. 1000 milliLiter(s) (100 mL/Hr) IV Continuous <Continuous>    MEDICATIONS  (PRN):      CAPILLARY BLOOD GLUCOSE      POCT Blood Glucose.: 139 mg/dL (28 May 2021 09:01)  POCT Blood Glucose.: 177 mg/dL (27 May 2021 22:02)  POCT Blood Glucose.: 152 mg/dL (27 May 2021 17:09)  POCT Blood Glucose.: 176 mg/dL (27 May 2021 12:37)    I&O's Summary    27 May 2021 07:01  -  28 May 2021 07:00  --------------------------------------------------------  IN: 200 mL / OUT: 800 mL / NET: -600 mL        PHYSICAL EXAM:  Vital Signs Last 24 Hrs  T(C): 36.6 (28 May 2021 05:08), Max: 36.8 (27 May 2021 20:54)  T(F): 97.8 (28 May 2021 05:08), Max: 98.2 (27 May 2021 20:54)  HR: 83 (28 May 2021 05:08) (81 - 86)  BP: 147/79 (28 May 2021 05:08) (144/76 - 159/84)  BP(mean): --  RR: 18 (28 May 2021 05:08) (18 - 18)  SpO2: 94% (28 May 2021 05:08) (94% - 97%)    GENERAL: No acute distress, well-developed  HEAD:  Atraumatic, Normocephalic  EYES: EOMI, PERRLA, conjunctiva and sclera clear  NECK: Supple, no lymphadenopathy, no JVD  CHEST/LUNG: CTAB; No wheezes, rales, or rhonchi  HEART: Regular rate and rhythm; No murmurs, rubs, or gallops  ABDOMEN: Soft, non-tender, non-distended; normal bowel sounds, no organomegaly, left nephrostomy tube   EXTREMITIES:  2+ peripheral pulses b/l, No clubbing, cyanosis, or edema  NEUROLOGY: A&O x 3, no focal deficits  SKIN: No rashes or lesions    LABS:                        11.4   10.17 )-----------( 296      ( 28 May 2021 06:50 )             35.4     05-28    136  |  100  |  13  ----------------------------<  172<H>  3.7   |  21<L>  |  1.43<H>    Ca    8.7      28 May 2021 06:49  Phos  2.8     05-28  Mg     1.6     05-28    TPro  7.6  /  Alb  3.1<L>  /  TBili  0.5  /  DBili  x   /  AST  34  /  ALT  21  /  AlkPhos  88  05-28                RADIOLOGY & ADDITIONAL TESTS:  Results Reviewed:   Imaging Personally Reviewed:  Electrocardiogram Personally Reviewed:    COORDINATION OF CARE:  Care Discussed with Consultants/Other Providers [Y/N]:  Prior or Outpatient Records Reviewed [Y/N]:

## 2021-05-28 NOTE — CONSULT NOTE ADULT - SUBJECTIVE AND OBJECTIVE BOX
[facility] DIVISION OF KIDNEY DISEASES AND HYPERTENSION -- INITIAL CONSULT NOTE  --------------------------------------------------------------------------------  Mil Trujillo   Nephrology Fellow  Pager NS: [phone]/ [facility]: [phone]  (After 5 pm or on weekends please page the on-call fellow, can view the schedule on [facility]. Login is katie rajan, schedule under [facility] medicine, psych, derm.    HPI: Patient is a 65 year old male with pmhx of DM on metformin and nephrolithiasis (urologist in [place]) presents to [facility] ED on 5/22 with left lower quadrant pain x 20 days, worsened 1 week ago and decreased urinary output. Found to have obstructing stone causing hydronephrosis, s/p L NT. Also c/b pyelonephritis and MSSA bacteremia on Cefazolin. In addition patient found to have a pancreatic mass s/p EUS + FNB of mass and peripancreatic lymph node. Nephrology is consulted for ELIESER, pt's baseline Cr is .8-1mg/dl, acutely aldo to 1.38mg/dl on 5/26 and has remained ~1.3-1.4mg/dl since then. Of note, patient received CT with IV contrast on 5/24. No hypotension noted. Pt endorsing fever but no chills. No CP/SOB, LE edema.        PAST HISTORY  --------------------------------------------------------------------------------  PAST MEDICAL & SURGICAL HISTORY:  Diabetes    Kidney stone on left side      FAMILY HISTORY:    PAST SOCIAL HISTORY:    ALLERGIES & MEDICATIONS  --------------------------------------------------------------------------------  Allergies    No Known Allergies    Intolerances      Standing Inpatient Medications  ceFAZolin   IVPB 2000 milliGRAM(s) IV Intermittent every 8 hours  dextrose 40% Gel 15 Gram(s) Oral once  dextrose 5%. 1000 milliLiter(s) IV Continuous <Continuous>  dextrose 5%. 1000 milliLiter(s) IV Continuous <Continuous>  dextrose 50% Injectable 25 Gram(s) IV Push once  dextrose 50% Injectable 12.5 Gram(s) IV Push once  dextrose 50% Injectable 25 Gram(s) IV Push once  glucagon  Injectable 1 milliGRAM(s) IntraMuscular once  heparin   Injectable 5000 Unit(s) SubCutaneous every 8 hours  insulin lispro (ADMELOG) corrective regimen sliding scale   SubCutaneous three times a day before meals  insulin lispro (ADMELOG) corrective regimen sliding scale   SubCutaneous at bedtime  polyethylene glycol 3350 17 Gram(s) Oral daily  sodium chloride 0.9%. 1000 milliLiter(s) IV Continuous <Continuous>    PRN Inpatient Medications      REVIEW OF SYSTEMS  --------------------------------------------------------------------------------  Gen: No fevers/chills  Skin: No rashes  Head/Eyes/Ears: Normal hearing, no difficulty seeing  Respiratory: No dyspnea, cough  CV: No chest pain  GI: No abdominal pain, diarrhea  : No dysuria, hematuria  MSK: No  edema      VITALS/PHYSICAL EXAM  --------------------------------------------------------------------------------  T(C): 36.6 (05-28-21 @ 05:08), Max: 36.8 (05-27-21 @ 09:13)  HR: 83 (05-28-21 @ 05:08) (77 - 86)  BP: 147/79 (05-28-21 @ 05:08) (144/76 - 159/84)  RR: 18 (05-28-21 @ 05:08) (18 - 18)  SpO2: 94% (05-28-21 @ 05:08) (93% - 97%)  Wt(kg): --  Height (cm): 177.8 (05-26-21 @ 16:58)  Weight (kg): 104.3 (05-26-21 @ 16:58)  BMI (kg/m2): 33 (05-26-21 @ 16:58)  BSA (m2): 2.21 (05-26-21 @ 16:58)      05-27-21 @ 07:01  -  05-28-21 @ 07:00  --------------------------------------------------------  IN: 200 mL / OUT: 800 mL / NET: -600 mL      Physical Exam:    	Gen: NAD  	HEENT: Anicteric  	Pulm: CTA B/L  	CV: S1S2  	Abd: Soft, +BS               : Left NT  	MSK: No LE edema B/L  	Neuro: Awake  	Skin: Warm and dry  	Vascular access:      LABS/STUDIES  --------------------------------------------------------------------------------              11.4   10.17 >-----------<  296      [05-28-21 @ 06:50]              35.4     136  |  100  |  13  ----------------------------<  172      [05-28-21 @ 06:49]  3.7   |  21  |  1.43        Ca     8.7     [05-28-21 @ 06:49]      Mg     1.6     [05-28-21 @ 06:49]      Phos  2.8     [05-28-21 @ 06:49]    TPro  7.6  /  Alb  3.1  /  TBili  0.5  /  DBili  x   /  AST  34  /  ALT  21  /  AlkPhos  88  [05-28-21 @ 06:49]        Serum Osmolality 289      [05-27-21 @ 17:41]    Creatinine Trend:  SCr 1.43 [05-28 @ 06:49]  SCr 1.39 [05-27 @ 17:41]  SCr 1.51 [05-27 @ 07:00]  SCr 1.47 [05-26 @ 21:27]  SCr 1.38 [05-26 @ 07:06]    Urinalysis - [05-22-21 @ 21:19]      Color Yellow / Appearance Turbid / SG >1.050 / pH 6.5      Gluc Negative / Ketone Negative  / Bili Negative / Urobili Negative       Blood Moderate / Protein 100 mg/dL / Leuk Est Large / Nitrite Negative      RBC 28 / WBC 3137 / Hyaline 0 / Gran  / Sq Epi  / Non Sq Epi 1 / Bacteria Few      TSH 1.10      [05-26-21 @ 09:23]  Lipid: chol 137, , HDL 19, LDL --      [05-24-21 @ 08:57]    HBsAb Nonreact      [05-27-21 @ 08:36]  HBsAg Nonreact      [05-27-21 @ 08:36]  HBcAb Nonreact      [05-27-21 @ 08:36]  HCV 0.16, Nonreact      [05-24-21 @ 10:30]  HIV Nonreact      [05-27-21 @ 08:19]

## 2021-05-28 NOTE — PROGRESS NOTE ADULT - SUBJECTIVE AND OBJECTIVE BOX
TSERING SCANLON 65y [id number]    Patient is a 65y old  Male who presents with a chief complaint of hydronephrosis (28 May 2021 11:25)      Follow Up/CC:  ID following for bacteremia    Interval History/ROS: no fever, planning for DC home     Allergies    No Known Allergies    Intolerances        ANTIMICROBIALS:  ceFAZolin   IVPB 2000 every 8 hours      MEDICATIONS  (STANDING):  ceFAZolin   IVPB 2000 milliGRAM(s) IV Intermittent every 8 hours  dextrose 40% Gel 15 Gram(s) Oral once  dextrose 5%. 1000 milliLiter(s) (50 mL/Hr) IV Continuous <Continuous>  dextrose 5%. 1000 milliLiter(s) (100 mL/Hr) IV Continuous <Continuous>  dextrose 50% Injectable 25 Gram(s) IV Push once  dextrose 50% Injectable 12.5 Gram(s) IV Push once  dextrose 50% Injectable 25 Gram(s) IV Push once  glucagon  Injectable 1 milliGRAM(s) IntraMuscular once  heparin   Injectable 5000 Unit(s) SubCutaneous every 8 hours  insulin lispro (ADMELOG) corrective regimen sliding scale   SubCutaneous three times a day before meals  insulin lispro (ADMELOG) corrective regimen sliding scale   SubCutaneous at bedtime  polyethylene glycol 3350 17 Gram(s) Oral daily  sodium chloride 0.9%. 1000 milliLiter(s) (100 mL/Hr) IV Continuous <Continuous>    MEDICATIONS  (PRN):        Vital Signs Last 24 Hrs  T(C): 36.6 (28 May 2021 14:29), Max: 36.8 (27 May 2021 20:54)  T(F): 97.9 (28 May 2021 14:29), Max: 98.2 (27 May 2021 20:54)  HR: 78 (28 May 2021 14:29) (78 - 86)  BP: 160/89 (28 May 2021 14:29) (144/76 - 160/89)  BP(mean): --  RR: 18 (28 May 2021 14:29) (18 - 18)  SpO2: 96% (28 May 2021 14:29) (94% - 97%)    CBC Full  -  ( 28 May 2021 06:50 )  WBC Count : 10.17 K/uL  RBC Count : 4.23 M/uL  Hemoglobin : 11.4 g/dL  Hematocrit : 35.4 %  Platelet Count - Automated : 296 K/uL  Mean Cell Volume : 83.7 fl  Mean Cell Hemoglobin : 27.0 pg  Mean Cell Hemoglobin Concentration : 32.2 gm/dL  Auto Neutrophil # : 7.41 K/uL  Auto Lymphocyte # : 1.49 K/uL  Auto Monocyte # : 0.79 K/uL  Auto Eosinophil # : 0.22 K/uL  Auto Basophil # : 0.03 K/uL  Auto Neutrophil % : 72.7 %  Auto Lymphocyte % : 14.7 %  Auto Monocyte % : 7.8 %  Auto Eosinophil % : 2.2 %  Auto Basophil % : 0.3 %    05-28    136  |  100  |  13  ----------------------------<  172<H>  3.7   |  21<L>  |  1.43<H>    Ca    8.7      28 May 2021 06:49  Phos  2.8     05-28  Mg     1.6     05-28    TPro  7.6  /  Alb  3.1<L>  /  TBili  0.5  /  DBili  x   /  AST  34  /  ALT  21  /  AlkPhos  88  05-28    LIVER FUNCTIONS - ( 28 May 2021 06:49 )  Alb: 3.1 g/dL / Pro: 7.6 g/dL / ALK PHOS: 88 U/L / ALT: 21 U/L / AST: 34 U/L / GGT: x               MICROBIOLOGY:  .Blood Blood-Peripheral  05-24-21   No growth to date.  --  --      .Urine Nephrostomy - Left  05-23-21   >100,000 CFU/ml Staphylococcus aureus  --  Staphylococcus aureus      .Blood Blood-Peripheral  05-23-21   Growth in aerobic bottle: Staphylococcus aureus  Please note change in PCR report:  CNS= not detected  ***Blood Panel PCR results on this specimen are available  approximately 3 hours after the Gram stain result.***  Gram stain, PCR, and/or cultureresults may not always  correspond due to difference in methodologies.  ************************************************************  This PCR assay was performed by multiplex PCR. This  Assay tests for 66 bacterial and resistance gene targets.  Please refer to the [facility] test directory  at [email or web address] for details.  --  Blood Culture PCR  Gram Positive Cocci in Clusters      .Urine Clean Catch (Midstream)  05-23-21   >100,000 CFU/ml Staphylococcus aureus  --  Staphylococcus aureus          RADIOLOGY    < from: Xray Chest 1 View AP/PA (05.27.21 @ 14:48) >  IMPRESSION:  PICC line in the SVC. No pneumothorax.    Linear atelectasis in both lungs.    < end of copied text >

## 2021-05-28 NOTE — PROGRESS NOTE ADULT - PROBLEM SELECTOR PROBLEM 3
Pancreatic mass
ELIESER (acute kidney injury)
Pancreatic mass
ELIESER (acute kidney injury)
ELIESER (acute kidney injury)
MSSA bacteremia

## 2021-05-28 NOTE — PROGRESS NOTE ADULT - RS GEN PE MLT RESP DETAILS PC
clear to auscultation bilaterally/no wheezes
respirations non-labored/clear to auscultation bilaterally/no wheezes
respirations non-labored/clear to auscultation bilaterally/no wheezes

## 2021-05-28 NOTE — PROGRESS NOTE ADULT - PROBLEM SELECTOR PLAN 7
Diet:  regular diet CC  DVT: will be on full AC with heparin but if stopped with switch to heparin sq  Consults: urology (signed off 5/24), IR and GI, surg onc

## 2021-05-29 LAB
CULTURE RESULTS: SIGNIFICANT CHANGE UP
CULTURE RESULTS: SIGNIFICANT CHANGE UP
SPECIMEN SOURCE: SIGNIFICANT CHANGE UP
SPECIMEN SOURCE: SIGNIFICANT CHANGE UP

## 2021-05-31 PROBLEM — Z78.9 DOES NOT USE TOBACCO: Status: ACTIVE | Noted: 2021-05-31

## 2021-05-31 PROBLEM — Z80.8 FAMILY HISTORY OF MALIGNANT NEOPLASM OF BRAIN: Status: ACTIVE | Noted: 2021-05-31

## 2021-05-31 PROBLEM — Z80.3 FAMILY HISTORY OF MALIGNANT NEOPLASM OF BREAST: Status: ACTIVE | Noted: 2021-05-31

## 2021-06-01 ENCOUNTER — OUTPATIENT (OUTPATIENT)
Dept: OUTPATIENT SERVICES | Facility: HOSPITAL | Age: 66
LOS: 1 days | Discharge: HOME | End: 2021-06-01

## 2021-06-01 ENCOUNTER — APPOINTMENT (OUTPATIENT)
Dept: MULTI SPECIALTY CLINIC | Facility: CLINIC | Age: 66
End: 2021-06-01
Payer: MEDICARE

## 2021-06-01 ENCOUNTER — NON-APPOINTMENT (OUTPATIENT)
Age: 66
End: 2021-06-01

## 2021-06-01 DIAGNOSIS — Z80.8 FAMILY HISTORY OF MALIGNANT NEOPLASM OF OTHER ORGANS OR SYSTEMS: ICD-10-CM

## 2021-06-01 DIAGNOSIS — M85.9 DISORDER OF BONE DENSITY AND STRUCTURE, UNSPECIFIED: ICD-10-CM

## 2021-06-01 DIAGNOSIS — Z80.3 FAMILY HISTORY OF MALIGNANT NEOPLASM OF BREAST: ICD-10-CM

## 2021-06-01 DIAGNOSIS — Z78.9 OTHER SPECIFIED HEALTH STATUS: ICD-10-CM

## 2021-06-01 DIAGNOSIS — E11.65 TYPE 2 DIABETES MELLITUS WITH HYPERGLYCEMIA: ICD-10-CM

## 2021-06-01 DIAGNOSIS — Z86.39 PERSONAL HISTORY OF OTHER ENDOCRINE, NUTRITIONAL AND METABOLIC DISEASE: ICD-10-CM

## 2021-06-01 PROCEDURE — 99213 OFFICE O/P EST LOW 20 MIN: CPT | Mod: 95

## 2021-06-01 PROCEDURE — 99214 OFFICE O/P EST MOD 30 MIN: CPT | Mod: 95

## 2021-06-01 NOTE — HISTORY OF PRESENT ILLNESS
[Incidental finding] : incidental finding [Abdominal Pain] : abdominal pain [EUS] : EUS [Biopsy] : biopsy performed [Was the patient reviewed at [facility]?] : The patient reviewed at [facility] [Before surgical resection] : before surgical resection [1] : 1 [Other: ___] : [unfilled] [Not seen outside] : not seen outside [Home] : at home, [unfilled] , at the time of the visit. [Medical Office: ([facility])___] : at the medical office located in  [de-identified] : Mr. TSERING SCANLON is a 65 year old male who presents for evaluation in our Pancreas Multidisciplinary Clinic.\par His PMH includes type 2 DM (dx ~10 yrs ago), nephrolithiasis, UTI. He initially presented to [facility] on 21 with LLQ pain x 20 days and decreased urinary output after having known obstructing renal stone recently. 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 \par During hospital course he had nephrostomy tube placement by IR and was on IV antibiotics (Zosyn, switched to Ancef post cultures). He has a PICC line for home antibiotics due to MSSA bacteremia. \par \par Patient underwent further workup for pancreas mass, including MRCP 21 showing 5 x 3.8 cm pancreatic body mass encasing the splenic artery and left adrenal lesion, concern for pancreatic adenocarcinoma.He is s/p EUS on 21 noting mass in body of pancreas and peripancreatic, celiac and portal LNs, one was bx. Pathology results are pending*.  CT chest negative for lung metastasis. Patients hospital course was c/b ELIESER suspected to be 2/2 contrast. Most recent Creatinine level 21 was 1.43. \par \par +Weight loss of 26 lbs over 1-2 months, which he attributes to having symptoms for urinary tract issues. Patient also notes some pain in his LUQ of abdomen since about 6 months ago. \par ECO\par \par Family hx:  Brain tumor- sister,  2 paternal aunts- breast cancer \par \par  [TextBox_6] : Left hydronephrosis [TextBox_4] : 5/23/21 [TextBox_39] : PENDING [TextBox_5] : 6/1/21 [TextBox_38] : 6540 [TextBox_40] : 5/22/21 [TextBox_74] : CEA: 37.7

## 2021-06-01 NOTE — ASSESSMENT
[FreeTextEntry1] : 65 year old man with likely resectable pancreatic adenocarcinoma, with involvement of the splenic artery and vein, elevated , along with active left kidney urolithiasis with urosepsis - makes him borderline surgical candidate and we would favor upfront chemotherapy\par \par Plan:\par 1) Medical oncology consultation\par 2) Urology consultation - timing of intervention for urolithiasis\par 3) Surgical resection of pancreatic mass after 4 months of chemotherapy\par 4) RTO in 2 months after first imaging

## 2021-06-02 ENCOUNTER — NON-APPOINTMENT (OUTPATIENT)
Age: 66
End: 2021-06-02

## 2021-06-02 PROBLEM — E11.9 TYPE 2 DIABETES MELLITUS WITHOUT COMPLICATIONS: Chronic | Status: ACTIVE | Noted: 2021-05-22

## 2021-06-02 PROBLEM — N20.0 CALCULUS OF KIDNEY: Chronic | Status: ACTIVE | Noted: 2021-05-22

## 2021-06-03 NOTE — HISTORY OF PRESENT ILLNESS
[Incidental finding] : incidental finding [Abdominal Pain] : abdominal pain [EUS] : EUS [Biopsy] : biopsy performed [Was the patient reviewed at [place]?] : The patient reviewed at [place] [Before surgical resection] : before surgical resection [1] : 1 [Not seen outside] : not seen outside [de-identified] : This is a non-billable note*\par \par \par Mr. TSERING SCANLON is a 65 year old male who presents for evaluation in our Pancreas Multidisciplinary Clinic.\par His PMH includes type 2 DM (dx ~10 yrs ago), nephrolithiasis, UTI. He initially presented to [facility] on 21 with LLQ pain x 20 days and decreased urinary output after having known obstructing renal stone recently. 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 \par During hospital course he had nephrostomy tube placement by IR and was on IV antibiotics (Zosyn, switched to Ancef post cultures). He has a PICC line for home antibiotics due to MSSA bacteremia. \par \par Patient underwent further workup for pancreas mass, including MRCP 21 showing 5 x 3.8 cm pancreatic body mass encasing the splenic artery and left adrenal lesion, concern for pancreatic adenocarcinoma.He is s/p EUS on 21 noting mass in body of pancreas and peripancreatic, celiac and portal LNs, one was bx. Pathology results are pending*.  CT chest negative for lung metastasis. Patients hospital course was c/b ELIESER suspected to be 2/2 contrast. Most recent Creatinine level 21 was 1.43. \par \par +Weight loss of 26 lbs over 1-2 months, which he attributes to having symptoms for urinary tract issues. Patient also notes some pain in his LUQ of abdomen since about 6 months ago. \par ECO\par \par Family hx:  Brain tumor- sister,  2 paternal aunts- breast cancer \par  [TextBox_4] : 5/23/21 [TextBox_6] : Left hydronephrosis [TextBox_16] : L [TextBox_39] : PENDING [Other: ___] : [unfilled] [Other: ____] : [unfilled] [Resectable] : resectable [body] : body [Chemotherapy] : chemotherapy [TextBox_5] : 6/1/21 [TextBox_38] : 0796 [TextBox_40] : 5/22/21 [TextBox_56] : 5 [TextBox_74] : CEA: 37.7.    Technically Resectable/ medically borderline [TextBox_7] : 6/1/21:   PDAC (bx pending results). Technically resectable. Pt medically borderline.\par  [TextBox_9] : 6/1/21:  Needs to f/u with urology for L hydro.     Neoadjuvant chemo- FFX vs gem/abrax. Needs antibiotics for urosepsis (currently on IV).\par -Repeat imaging 2 months into chemo and re-review \par \par *POST review*--  bx results c/w adenocarcinoma.   \par

## 2021-06-03 NOTE — ASSESSMENT
[FreeTextEntry1] : 6/1/21  Surg Onc Dr Aquino \par A/P\par \par 65 year old man with likely resectable pancreatic adenocarcinoma, with involvement of the splenic artery and vein, elevated , along with active left kidney urolithiasis with urosepsis - makes him borderline surgical candidate and we would favor upfront chemotherapy\par \par Plan:\par 1) Medical oncology consultation\par 2) Urology consultation - timing of intervention for urolithiasis\par 3) Surgical resection of pancreatic mass after 4 months of chemotherapy\par 4) RTO in 2 months after first imaging. \par \par \par -----------------------------------------------------\par 6/1/21  Med Onc Dr Sherman\par A/P\par Adenocarcinoma of pancreas (157.9) (C25.9)\par \par A discussion took place with the patient's son and daughter as the patient was not available to answer questions. It was explained that the findings during the admission indicate that the patient has what appears to be a tumor involving the pancreas with extension to the splenic artery associated with a left adrenal mass. The patient's treatment is complicated because of his recent episode of urosepsis from nephrolithiasis and left ureteral obstruction requiring nephrostomy tube. He is currently on home IV antibiotics.\par \par We discussed the fact that the patient's disease appears to be operable but surgery would not be indicated at this time. The patient would be a candidate for neoadjuvant chemotherapy. We discussed the fact that there are 2 regimens which would be considered including FOLFIRINOX versus gemcitabine/Abraxane treatment. It was recommended that the patient and his family make an appointment in 1 week to do follow-up since he will have had further IV antibiotic treatment for his urosepsis and urinary obstruction from his kidney stones. The patient was encouraged to continue his nutrition support and also attempt to do some physical activity to improve his stamina. At the next visit we will discuss which chemotherapy regimen would be best suited for his medical conditions. Once we have more information final recommendations will be made. We will also discuss scheduling him for a thyroid sonogram because of the left thyroid nodule and also possibly doing a PET/CT. \par

## 2021-06-07 ENCOUNTER — OUTPATIENT (OUTPATIENT)
Dept: OUTPATIENT SERVICES | Facility: HOSPITAL | Age: 66
LOS: 1 days | End: 2021-06-07
Payer: MEDICARE

## 2021-06-07 ENCOUNTER — APPOINTMENT (OUTPATIENT)
Dept: UROLOGY | Facility: HOSPITAL | Age: 66
End: 2021-06-07

## 2021-06-07 VITALS
WEIGHT: 207 LBS | BODY MASS INDEX: 28.98 KG/M2 | TEMPERATURE: 97.2 F | HEIGHT: 71 IN | RESPIRATION RATE: 18 BRPM | DIASTOLIC BLOOD PRESSURE: 82 MMHG | HEART RATE: 115 BPM | SYSTOLIC BLOOD PRESSURE: 118 MMHG

## 2021-06-07 DIAGNOSIS — Z87.442 PERSONAL HISTORY OF URINARY CALCULI: ICD-10-CM

## 2021-06-07 DIAGNOSIS — R30.0 DYSURIA: ICD-10-CM

## 2021-06-07 PROCEDURE — G0463: CPT

## 2021-06-07 NOTE — REVIEW OF SYSTEMS
[Fever] : no fever [Chills] : no chills [Chest Pain] : no chest pain [Shortness Of Breath] : no shortness of breath [Abdominal Pain] : no abdominal pain [Dysuria] : no dysuria [Incontinence] : no incontinence [Nocturia] : no nocturia [Testicular Pain] : no testicular pain

## 2021-06-07 NOTE — HISTORY OF PRESENT ILLNESS
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 Fevers and 2cm proximal left stone. Ct imaging also showed an incidental pancreatic mass and splenic vein thrombosis. Patient was taken for urgent NT placement with IR. Pt was started on zosyn then switched to ancef after getting UCx growing S. aureus. One tube of BCx also growing MSSA so patient to c/w ancef for 4 weeks via PICC. \par \par Pt seen today in clinic. NT draining yellow. no issues. denies fever/chills, abdominal.flank pain, or difficulty urinating. Pt to see ID next week. would like to schedule stone removal surgery. \par \par

## 2021-06-07 NOTE — ASSESSMENT
[FreeTextEntry1] : 66yo male with h/o septic stone, s/p 5/22 left NT placement with IR. Urine and blood cultures growing MSSA, now with PICC to complete 4 week course of ancef. \par - f/u with ID next week: to obtain clearance for left percutaneous stone removal\par - continue ancef via PICC per ID\par - return to Uro clinic in 3 weeks (1week after completion of abx) to repeat urine culture and set up surgery for stone removal. \par

## 2021-06-07 NOTE — PHYSICAL EXAM
[Normal Appearance] : normal appearance [General Appearance - In No Acute Distress] : no acute distress [] : no respiratory distress [Abdomen Soft] : soft [Abdomen Tenderness] : non-tender [Costovertebral Angle Tenderness] : no ~M costovertebral angle tenderness [Oriented To Time, Place, And Person] : oriented to person, place, and time [FreeTextEntry1] : left NT draining clear yellow urine

## 2021-06-10 ENCOUNTER — APPOINTMENT (OUTPATIENT)
Dept: HEMATOLOGY ONCOLOGY | Facility: CLINIC | Age: 66
End: 2021-06-10

## 2021-06-10 NOTE — HISTORY OF PRESENT ILLNESS
[Disease: _____________________] : Disease: [unfilled] [de-identified] : This is a 65-year-old man with history of pancreatic carcinoma.  The patient's history dates back to a recent admission on May 22, 2021 to [facility] because of left lower quadrant pain  which occurred over  3-weeks.  The patient was noticing decreased urine output and was found to have an elevated creatinine.  CAT scan revealed left hydronephrosis due to nephrolithiasis.  There was also a pancreatic mass noted which was associated with splenic vein thrombosis and a left adrenal nodule.  The patient was found to have bacteremia with MSSA and received antibiotic therapy with Zosyn and Ancef.  A nephrostomy tube was placed.  The patient also had a PICC line inserted for home IV antibiotics.\par \par On May 26, 2021 the patient underwent endoscopic ultrasound which revealed a 3.6 x 2.7 cm pancreatic mass involving the splenic vessels with thrombosis of the splenic artery.  There are multiple peripancreatic, celiac and portal lymph nodes noted.  Biopsies of the pancreatic mass and biopsy of a 2 cm portal lymph node were performed and the pathology is pending.  There was also a left adrenal mass measuring 1.5 cm.  A CAT scan of the chest revealed no metastases.  The patient's creatinine has come down to 1.38 after treatment. \par \par The patient is under active treatment for diabetes and has been on Metformin which the son states has resulted in a 20 to 25 pound weight loss.  The patient currently is eating and gaining strength.  His CA 19–9 in the hospital was 1577.  His family history is positive for a sister with a brain tumor and to  paternal aunts with breast cancer.  The patient has undergone surgical consultation with Dr. Aquino.   The CAT scan of the chest also revealed bone lesions at T1 and T2 felt to be related to bone islands.  There is also enlargement of the left thyroid lobe and a ultrasound is being recommended.

## 2021-06-10 NOTE — REASON FOR VISIT
[Home] : at home, [unfilled] , at the time of the visit. [Medical Office: ([facility])___] : at the medical office located in  [Verbal consent obtained from patient] : the patient, [unfilled] [Initial Consultation] : an initial consultation [Family Member] : family member [FreeTextEntry2] : ca pancreas

## 2021-06-10 NOTE — CONSULT LETTER
[Dear  ___] : Dear  [unfilled], [Consult Letter:] : I had the pleasure of evaluating your patient, [unfilled]. [Please see my note below.] : Please see my note below. [Consult Closing:] : Thank you very much for allowing me to participate in the care of this patient.  If you have any questions, please do not hesitate to contact me. [Sincerely,] : Sincerely, [DrDylon  ___] : Dr. HEWITT [FreeTextEntry2] : Dr. Raudel Aquino [FreeTextEntry3] : [facility]\par [facility]\par [facility]

## 2021-06-10 NOTE — ASSESSMENT
[Curative] : Goals of care discussed with patient: Curative [Palliative Care Plan] : not applicable at this time [FreeTextEntry1] : A discussion took place with the patient's son and daughter as the patient was not available to answer questions.  It was explained that the findings during the admission indicate that the patient has what appears to be a tumor involving the pancreas with extension to the splenic artery associated with a left adrenal mass.  The patient's treatment is complicated because of his recent episode of urosepsis from nephrolithiasis and left ureteral obstruction requiring nephrostomy tube.  He is currently on home IV antibiotics.\par \par We discussed the fact that the patient's disease appears to be operable but surgery would not be indicated at this time. The patient would be a candidate for neoadjuvant chemotherapy.  We discussed the fact that there are 2 regimens which would be considered including FOLFIRINOX versus gemcitabine/Abraxane treatment.  It was recommended that the patient and his family make an appointment in 1 week to do follow-up since he will have had further IV antibiotic treatment for his urosepsis and urinary obstruction from his kidney stones.  The patient was encouraged to continue his nutrition support and also attempt to do some physical activity to improve his stamina.  At the next visit we will discuss which chemotherapy regimen would be best suited for his medical conditions.  Once we have more information final recommendations will be made.  We will also discuss scheduling him for a thyroid sonogram because of the left thyroid nodule and also possibly doing a PET/CT.

## 2021-06-11 ENCOUNTER — TRANSCRIPTION ENCOUNTER (OUTPATIENT)
Age: 66
End: 2021-06-11

## 2021-06-11 DIAGNOSIS — R11.0 NAUSEA: ICD-10-CM

## 2021-06-11 RX ORDER — METOCLOPRAMIDE 5 MG/1
5 TABLET ORAL 3 TIMES DAILY
Qty: 15 | Refills: 0 | Status: ACTIVE | COMMUNITY
Start: 2021-06-11 | End: 1900-01-01

## 2021-06-17 ENCOUNTER — NON-APPOINTMENT (OUTPATIENT)
Age: 66
End: 2021-06-17

## 2021-06-17 ENCOUNTER — APPOINTMENT (OUTPATIENT)
Dept: HEMATOLOGY ONCOLOGY | Facility: CLINIC | Age: 66
End: 2021-06-17

## 2021-06-22 ENCOUNTER — APPOINTMENT (OUTPATIENT)
Dept: INFECTIOUS DISEASE | Facility: CLINIC | Age: 66
End: 2021-06-22
Payer: MEDICAID

## 2021-06-22 DIAGNOSIS — B95.61 BACTEREMIA: ICD-10-CM

## 2021-06-22 DIAGNOSIS — R78.81 BACTEREMIA: ICD-10-CM

## 2021-06-22 DIAGNOSIS — Z09 ENCOUNTER FOR FOLLOW-UP EXAMINATION AFTER COMPLETED TREATMENT FOR CONDITIONS OTHER THAN MALIGNANT NEOPLASM: ICD-10-CM

## 2021-06-22 PROCEDURE — 99442: CPT

## 2021-06-23 ENCOUNTER — NON-APPOINTMENT (OUTPATIENT)
Age: 66
End: 2021-06-23

## 2021-06-23 ENCOUNTER — APPOINTMENT (OUTPATIENT)
Dept: UROLOGY | Facility: CLINIC | Age: 66
End: 2021-06-23
Payer: MEDICAID

## 2021-06-23 DIAGNOSIS — N13.30 UNSPECIFIED HYDRONEPHROSIS: ICD-10-CM

## 2021-06-23 DIAGNOSIS — N20.0 CALCULUS OF KIDNEY: ICD-10-CM

## 2021-06-23 PROCEDURE — 99214 OFFICE O/P EST MOD 30 MIN: CPT

## 2021-06-23 NOTE — PHYSICAL EXAM
[General Appearance - Well Developed] : well developed [Edema] : no peripheral edema [] : no respiratory distress [Normal Station and Gait] : the gait and station were normal for the patient's age [Skin Color & Pigmentation] : normal skin color and pigmentation [No Focal Deficits] : no focal deficits [Oriented To Time, Place, And Person] : oriented to person, place, and time [FreeTextEntry1] : left nephrostomy

## 2021-06-23 NOTE — ASSESSMENT
[FreeTextEntry1] : CT 05/21- right kidney cyst. Left kidney stones 1.4 x 1.1 cm intrarenal stone, 2 x 1.2 cm UPJ stone, hydro. \par \par Left nephrostomy placed in IR. \par \par I had long discussion with patient about their stones, and about options, risks, and benefits of all treatments. Main options for definitive stone treatment include ESWL, URS, PCNL. \par \par ESWL success best with smaller, less dense stones, and with short skin to stone distance and favorable location of the stone within the urinary tract, while URS is more successful treatment with multiple stones, more dense stones, or challenging body habitus or stone location. PCNL is best option for larger, more dense and complex stones, and particularly those involving the lower pole. Non-definitive stone treatment options for drainage, using either stents or nephrostomy, also reviewed: these are of lower immediate surgical risks, but incur multiple procedures to manage and may have their own complications and effects on quality of life. Still, nephrostomy or nephroureteral catheter can allow maintenance of urinary system drainage without surgical risks, and management in office with exchanges (avoiding the anesthesia and testing which would be present with bilateral internal stent exchange).\par \par Risks of nontreatment with obstruction can lead to very high rate of renal function loss in stone-bearing kidney over the next months to years.\par \par In this patient's case, I recommend left PCNL\par \par Risks/benefits/success/recovery expectations all reviewed at length, particularly with respect to patient's comorbidities, and inclusive of infection/sepsis, bleeding, need for secondary procedures or secondary stages such as embolization or open surgery, and even risks of death due to acute issues superimposed on comorbidities.\par \par Pt prefers to undergo: left PCNL \par \par Will schedule.\par

## 2021-06-23 NOTE — HISTORY OF PRESENT ILLNESS
[None] : no symptoms [FreeTextEntry1] : 65 yr old male presents to [facility] care. 2 months ago, pt complained of left abdominal pain. Pt went to [facility]. Treated for left hydro with nephrostomy. PICC line IV Abx for one month, removed yesterday. Pt has a history of kidney stones- one year ago and pt passed stone. \par \par Surgical hx: none\par Medical hx: DM II \par Allergies: NKDA\par Social: Alcohol-none, Smoking-never, Drug- none, Occupation- retired- \par Family hx: Sister- brain tumor \par Medications: Metformin 1000 mg \par \par

## 2021-06-24 ENCOUNTER — OUTPATIENT (OUTPATIENT)
Dept: OUTPATIENT SERVICES | Facility: HOSPITAL | Age: 66
LOS: 1 days | End: 2021-06-24
Payer: MEDICARE

## 2021-06-24 VITALS
HEIGHT: 71 IN | DIASTOLIC BLOOD PRESSURE: 88 MMHG | OXYGEN SATURATION: 97 % | RESPIRATION RATE: 12 BRPM | WEIGHT: 205.03 LBS | SYSTOLIC BLOOD PRESSURE: 134 MMHG | HEART RATE: 97 BPM | TEMPERATURE: 98 F

## 2021-06-24 DIAGNOSIS — N20.0 CALCULUS OF KIDNEY: ICD-10-CM

## 2021-06-24 DIAGNOSIS — Z43.6 ENCOUNTER FOR ATTENTION TO OTHER ARTIFICIAL OPENINGS OF URINARY TRACT: Chronic | ICD-10-CM

## 2021-06-24 DIAGNOSIS — Z01.818 ENCOUNTER FOR OTHER PREPROCEDURAL EXAMINATION: ICD-10-CM

## 2021-06-24 LAB
ANION GAP SERPL CALC-SCNC: 19 MMOL/L — HIGH (ref 5–17)
BLD GP AB SCN SERPL QL: NEGATIVE — SIGNIFICANT CHANGE UP
BUN SERPL-MCNC: 12 MG/DL — SIGNIFICANT CHANGE UP (ref 7–23)
CALCIUM SERPL-MCNC: 9.6 MG/DL — SIGNIFICANT CHANGE UP (ref 8.4–10.5)
CHLORIDE SERPL-SCNC: 97 MMOL/L — SIGNIFICANT CHANGE UP (ref 96–108)
CO2 SERPL-SCNC: 20 MMOL/L — LOW (ref 22–31)
CREAT SERPL-MCNC: 1.04 MG/DL — SIGNIFICANT CHANGE UP (ref 0.5–1.3)
GLUCOSE SERPL-MCNC: 170 MG/DL — HIGH (ref 70–99)
HCT VFR BLD CALC: 36.3 % — LOW (ref 39–50)
HGB BLD-MCNC: 11.8 G/DL — LOW (ref 13–17)
MCHC RBC-ENTMCNC: 26.8 PG — LOW (ref 27–34)
MCHC RBC-ENTMCNC: 32.5 GM/DL — SIGNIFICANT CHANGE UP (ref 32–36)
MCV RBC AUTO: 82.3 FL — SIGNIFICANT CHANGE UP (ref 80–100)
NRBC # BLD: 0 /100 WBCS — SIGNIFICANT CHANGE UP (ref 0–0)
PLATELET # BLD AUTO: 322 K/UL — SIGNIFICANT CHANGE UP (ref 150–400)
POTASSIUM SERPL-MCNC: 2.9 MMOL/L — CRITICAL LOW (ref 3.5–5.3)
POTASSIUM SERPL-SCNC: 2.9 MMOL/L — CRITICAL LOW (ref 3.5–5.3)
RBC # BLD: 4.41 M/UL — SIGNIFICANT CHANGE UP (ref 4.2–5.8)
RBC # FLD: 15.2 % — HIGH (ref 10.3–14.5)
RH IG SCN BLD-IMP: NEGATIVE — SIGNIFICANT CHANGE UP
SODIUM SERPL-SCNC: 136 MMOL/L — SIGNIFICANT CHANGE UP (ref 135–145)
WBC # BLD: 9.68 K/UL — SIGNIFICANT CHANGE UP (ref 3.8–10.5)
WBC # FLD AUTO: 9.68 K/UL — SIGNIFICANT CHANGE UP (ref 3.8–10.5)

## 2021-06-24 RX ORDER — CHLORHEXIDINE GLUCONATE 213 G/1000ML
1 SOLUTION TOPICAL ONCE
Refills: 0 | Status: DISCONTINUED | OUTPATIENT
Start: 2021-07-01 | End: 2021-07-03

## 2021-06-24 RX ORDER — CEFAZOLIN SODIUM 1 G
2000 VIAL (EA) INJECTION ONCE
Refills: 0 | Status: DISCONTINUED | OUTPATIENT
Start: 2021-07-01 | End: 2021-07-01

## 2021-06-24 NOTE — H&P PST ADULT - HISTORY OF PRESENT ILLNESS
65 year old male with pmhx of DM on metformin and nephrolithiasis (urologist in [place]) presents to ED with left lower quadrant pain x 20 days, worsened 1 week ago and decreased urinary output. Pt endorses mild constant left lower quadrant pain without radiation, unrelieved with Tylenol accompanied with weakness, fatigue, decreased appetite with vomiting and nausea however states has been able to keep food down for the past few days, 26 pound weight loss in past 1-2  month, dysuria x 1 week and hematuria 1 week ago which has resolved now. Pt also endorsing fever at home 4 days ago with highest temp of 102. Pt last saw urologist a few months ago and had US which was benign, pt with history of several stones in left kidney and spontaneously passed all the stones without intervention, last time he passed a stone was 4 months ago. Denies flank pain, chest pain, SOB. Patient also notes some pain in his upper left quardant of abdomen. he states that it started about 6 months ago and that he feels it at night. the patient denies n/v/d/c, blood in stool, changes in bowel habits, stool. He denies changes in diet but states that he fasted for Ramadan the last month which is why he believes he lost so much weight.  64 YO Frisian speaking male PMH nephrolithiasis, T2DM (Ha1c 7/9%), newly found pancreatic carcinoma incidentally found on CT scan during 6 day hospital admission for urosepsis s/p left nephrostomy placed in IR & PICC line IV antibiotics x1 month, presents to PST for LEFT PERCUTANEOUS NEPHROSTOLITHOTOMY 7/1/2021. CT (5/2021) showed Left kidney stones 1.4 x 1.1 cm intrarenal stone, 2 x 1.2 cm UPJ stone, hydro. Denies any hematuria, dysuria, flank pain, palpitations, SOB, N/V, fever or chills.      ***Covid test scheduled for 6/28/2021 at [facility]. 66 YO Croatian speaking male PMH nephrolithiasis, T2DM (Ha1c 7/9%), newly found pancreatic carcinoma incidentally found on CT scan during 6 day hospital admission for urosepsis s/p left nephrostomy placed in IR & PICC line IV antibiotics x1 month, presents to PST for LEFT PERCUTANEOUS NEPHROSTOLITHOTOMY 7/1/2021. CT (5/2021) showed Left kidney stones 1.4 x 1.1 cm intrarenal stone, 2 x 1.2 cm UPJ stone, hydro. Denies any hematuria, dysuria, flank pain, palpitations, SOB, N/V, fever or chills.      ***Covid test scheduled for 6/28/2021 at [facility].    Addendum 6/24/21: Potassium 2.9 in PST, Dr. Almanzar called multiple times but mailbox is full and cannot accept messages. Patient's son Elsie reached via phone & understands he needs to also f/u with PMD for potassium supplements & that BMP will need to be repeated.  66 YO Amharic speaking male PMH nephrolithiasis, T2DM (Ha1c 7/9%), newly found pancreatic carcinoma incidentally found on CT scan during 6 day hospital admission for urosepsis s/p left nephrostomy placed in IR & PICC line IV antibiotics x1 month, presents to PST for LEFT PERCUTANEOUS NEPHROSTOLITHOTOMY 7/1/2021. CT (5/2021) showed Left kidney stones 1.4 x 1.1 cm intrarenal stone, 2 x 1.2 cm UPJ stone, hydro. Denies any hematuria, dysuria, flank pain, palpitations, SOB, N/V, fever or chills.      ***Covid test scheduled for 6/28/2021 at [facility].    Addendum 6/24/21: Potassium 2.9 in PST, Dr. Almanzar called multiple times but mailbox is full and cannot accept messages - lab results faxed to office. Patient's son Elsie reached via phone & understands he needs to also f/u with PMD for potassium supplements & that BMP will need to be repeated.  66 YO German speaking male PMH nephrolithiasis, T2DM (Ha1c 7/9%), newly found pancreatic carcinoma incidentally found on CT scan during 6 day hospital admission for urosepsis s/p left nephrostomy placed in IR & PICC line IV antibiotics x1 month, presents to PST for LEFT PERCUTANEOUS NEPHROSTOLITHOTOMY 7/1/2021. CT (5/2021) showed Left kidney stones 1.4 x 1.1 cm intrarenal stone, 2 x 1.2 cm UPJ stone, hydro. Denies any hematuria, dysuria, flank pain, palpitations, SOB, N/V, fever or chills.      ***Covid test scheduled for 6/28/2021 at [facility].    Addendum 6/24/21: Potassium 2.9 in PST, Dr. Almanzar called multiple times but mailbox is full and cannot accept messages - lab results faxed to office. Patient's son Elsie reached via phone & understands he needs to also f/u with PMD for potassium supplements & that BMP will need to be repeated. [id number] Still unable to speak with PMD d/t full mailbox. Spoke to pt's son & daughter (Omar) PMD prescribed 10 mEq KCl 1tablet/day, pt will return to [facility] on 6/28 for repeat labs.    66 YO Thai speaking male PMH nephrolithiasis, T2DM (Ha1c 7/9%), newly found pancreatic carcinoma incidentally found on CT scan during 6 day hospital admission for urosepsis s/p left nephrostomy placed in IR & PICC line IV antibiotics x1 month, presents to PST for LEFT PERCUTANEOUS NEPHROSTOLITHOTOMY 7/1/2021. CT (5/2021) showed Left kidney stones 1.4 x 1.1 cm intrarenal stone, 2 x 1.2 cm UPJ stone, hydro. Denies any hematuria, dysuria, flank pain, palpitations, SOB, N/V, fever or chills.      ***Covid test scheduled for 6/28/2021 at [facility].    Addendum 6/24/21: Potassium 2.9 in PST, Dr. Almanzar called multiple times but mailbox is full and cannot accept messages - lab results faxed to office. Patient's son Elsie reached via phone & understands he needs to also f/u with PMD for potassium supplements & that BMP will need to be repeated. 1544 Still unable to speak with PMD d/t full mailbox. Spoke to pt's son & daughter (Omar) PMD prescribed 10 mEq KCl 1tablet/day, pt will return to [facility] on 6/28 for repeat labs. 1600 Jose from Dr. Almanzar's office called [facility] and said patient directed to take 20 mEq KCl/day and will repeat labs on 6/28 at [facility].

## 2021-06-24 NOTE — H&P PST ADULT - NSICDXPROBLEM_GEN_ALL_CORE_FT
PROBLEM DIAGNOSES  Problem: Calculus of kidney  Assessment and Plan: LEFT PERCUTANEOUS NEPHROSTOLITHOTOMY  Pre-op education provided - all questions answered   Chlorhex soap & instructions given  CBC, BMP, T&S sent in PST  U cx sent 6/23 @ Dr. Hoenig's office  Hold metformin 24 hrs - last dose 6/29 PM       PROBLEM DIAGNOSES  Problem: Calculus of kidney  Assessment and Plan: LEFT PERCUTANEOUS NEPHROSTOLITHOTOMY  Pre-op education provided - all questions answered   Chlorhex soap & instructions given  CBC, BMP, T&S sent in PST  U cx sent 6/23 @ Dr. Hoenig's office  Hold metformin 24 hrs - last dose 6/29 PM  Preop eval (Dr. Verde) in paper chart

## 2021-06-24 NOTE — H&P PST ADULT - NSICDXPASTMEDICALHX_GEN_ALL_CORE_FT
PAST MEDICAL HISTORY:  Diabetes     Kidney stone on left side      PAST MEDICAL HISTORY:  Diabetes     Kidney stone on left side     Pancreatic carcinoma incidental finding  on CT 5/22/21  mass w/splenic vein thrombosis

## 2021-06-24 NOTE — H&P PST ADULT - PRIMARY CARE PROVIDER
Yohan Verde  - last visit 6/22/21 Yohan Verde  - preop eval 6/22/21 Yohan Verde , , Jose from front office  - preop eval 6/22/21

## 2021-06-25 LAB — BACTERIA UR CULT: NORMAL

## 2021-06-28 ENCOUNTER — APPOINTMENT (OUTPATIENT)
Dept: NEPHROLOGY | Facility: CLINIC | Age: 66
End: 2021-06-28

## 2021-06-28 ENCOUNTER — RX RENEWAL (OUTPATIENT)
Age: 66
End: 2021-06-28

## 2021-06-28 ENCOUNTER — OUTPATIENT (OUTPATIENT)
Dept: OUTPATIENT SERVICES | Facility: HOSPITAL | Age: 66
LOS: 1 days | End: 2021-06-28
Payer: MEDICARE

## 2021-06-28 ENCOUNTER — APPOINTMENT (OUTPATIENT)
Dept: UROLOGY | Facility: HOSPITAL | Age: 66
End: 2021-06-28

## 2021-06-28 DIAGNOSIS — Z11.52 ENCOUNTER FOR SCREENING FOR COVID-19: ICD-10-CM

## 2021-06-28 DIAGNOSIS — Z43.6 ENCOUNTER FOR ATTENTION TO OTHER ARTIFICIAL OPENINGS OF URINARY TRACT: Chronic | ICD-10-CM

## 2021-06-28 LAB
ANION GAP SERPL CALC-SCNC: 18 MMOL/L — HIGH (ref 5–17)
BUN SERPL-MCNC: 13 MG/DL — SIGNIFICANT CHANGE UP (ref 7–23)
CALCIUM SERPL-MCNC: 9.7 MG/DL — SIGNIFICANT CHANGE UP (ref 8.4–10.5)
CHLORIDE SERPL-SCNC: 98 MMOL/L — SIGNIFICANT CHANGE UP (ref 96–108)
CO2 SERPL-SCNC: 20 MMOL/L — LOW (ref 22–31)
CREAT SERPL-MCNC: 1.02 MG/DL — SIGNIFICANT CHANGE UP (ref 0.5–1.3)
GLUCOSE SERPL-MCNC: 159 MG/DL — HIGH (ref 70–99)
POTASSIUM SERPL-MCNC: 3.3 MMOL/L — LOW (ref 3.5–5.3)
POTASSIUM SERPL-SCNC: 3.3 MMOL/L — LOW (ref 3.5–5.3)
SARS-COV-2 RNA SPEC QL NAA+PROBE: SIGNIFICANT CHANGE UP
SODIUM SERPL-SCNC: 136 MMOL/L — SIGNIFICANT CHANGE UP (ref 135–145)

## 2021-06-28 PROCEDURE — 86900 BLOOD TYPING SEROLOGIC ABO: CPT

## 2021-06-28 PROCEDURE — 86850 RBC ANTIBODY SCREEN: CPT

## 2021-06-28 PROCEDURE — G0463: CPT

## 2021-06-28 PROCEDURE — 85027 COMPLETE CBC AUTOMATED: CPT

## 2021-06-28 PROCEDURE — 86901 BLOOD TYPING SEROLOGIC RH(D): CPT

## 2021-06-28 PROCEDURE — 80048 BASIC METABOLIC PNL TOTAL CA: CPT

## 2021-06-30 ENCOUNTER — APPOINTMENT (OUTPATIENT)
Dept: NUCLEAR MEDICINE | Facility: IMAGING CENTER | Age: 66
End: 2021-06-30

## 2021-06-30 ENCOUNTER — TRANSCRIPTION ENCOUNTER (OUTPATIENT)
Age: 66
End: 2021-06-30

## 2021-07-01 ENCOUNTER — APPOINTMENT (OUTPATIENT)
Dept: UROLOGY | Facility: HOSPITAL | Age: 66
End: 2021-07-01

## 2021-07-01 ENCOUNTER — RESULT REVIEW (OUTPATIENT)
Age: 66
End: 2021-07-01

## 2021-07-01 ENCOUNTER — INPATIENT (INPATIENT)
Facility: HOSPITAL | Age: 66
LOS: 1 days | Discharge: HOME | DRG: 660 | End: 2021-07-03
Attending: UROLOGY | Admitting: UROLOGY
Payer: MEDICARE

## 2021-07-01 VITALS
WEIGHT: 205.03 LBS | TEMPERATURE: 99 F | SYSTOLIC BLOOD PRESSURE: 141 MMHG | HEIGHT: 71 IN | DIASTOLIC BLOOD PRESSURE: 88 MMHG | OXYGEN SATURATION: 97 % | RESPIRATION RATE: 16 BRPM | HEART RATE: 102 BPM

## 2021-07-01 DIAGNOSIS — Z43.6 ENCOUNTER FOR ATTENTION TO OTHER ARTIFICIAL OPENINGS OF URINARY TRACT: Chronic | ICD-10-CM

## 2021-07-01 DIAGNOSIS — N20.0 CALCULUS OF KIDNEY: ICD-10-CM

## 2021-07-01 LAB
ANION GAP SERPL CALC-SCNC: 19 MMOL/L — HIGH (ref 5–17)
BUN SERPL-MCNC: 14 MG/DL — SIGNIFICANT CHANGE UP (ref 7–23)
CALCIUM SERPL-MCNC: 9.4 MG/DL — SIGNIFICANT CHANGE UP (ref 8.4–10.5)
CHLORIDE SERPL-SCNC: 97 MMOL/L — SIGNIFICANT CHANGE UP (ref 96–108)
CO2 SERPL-SCNC: 19 MMOL/L — LOW (ref 22–31)
CREAT SERPL-MCNC: 1.1 MG/DL — SIGNIFICANT CHANGE UP (ref 0.5–1.3)
GLUCOSE BLDC GLUCOMTR-MCNC: 163 MG/DL — HIGH (ref 70–99)
GLUCOSE BLDC GLUCOMTR-MCNC: 215 MG/DL — HIGH (ref 70–99)
GLUCOSE SERPL-MCNC: 181 MG/DL — HIGH (ref 70–99)
HCT VFR BLD CALC: 38 % — LOW (ref 39–50)
HGB BLD-MCNC: 12 G/DL — LOW (ref 13–17)
MCHC RBC-ENTMCNC: 27 PG — SIGNIFICANT CHANGE UP (ref 27–34)
MCHC RBC-ENTMCNC: 31.6 GM/DL — LOW (ref 32–36)
MCV RBC AUTO: 85.6 FL — SIGNIFICANT CHANGE UP (ref 80–100)
NRBC # BLD: 0 /100 WBCS — SIGNIFICANT CHANGE UP (ref 0–0)
PLATELET # BLD AUTO: 207 K/UL — SIGNIFICANT CHANGE UP (ref 150–400)
POTASSIUM SERPL-MCNC: 3.8 MMOL/L — SIGNIFICANT CHANGE UP (ref 3.5–5.3)
POTASSIUM SERPL-SCNC: 3.8 MMOL/L — SIGNIFICANT CHANGE UP (ref 3.5–5.3)
RBC # BLD: 4.44 M/UL — SIGNIFICANT CHANGE UP (ref 4.2–5.8)
RBC # FLD: 15.3 % — HIGH (ref 10.3–14.5)
SODIUM SERPL-SCNC: 135 MMOL/L — SIGNIFICANT CHANGE UP (ref 135–145)
WBC # BLD: 16.75 K/UL — HIGH (ref 3.8–10.5)
WBC # FLD AUTO: 16.75 K/UL — HIGH (ref 3.8–10.5)

## 2021-07-01 PROCEDURE — 83930 ASSAY OF BLOOD OSMOLALITY: CPT

## 2021-07-01 PROCEDURE — 96374 THER/PROPH/DIAG INJ IV PUSH: CPT

## 2021-07-01 PROCEDURE — 71260 CT THORAX DX C+: CPT

## 2021-07-01 PROCEDURE — 80053 COMPREHEN METABOLIC PANEL: CPT

## 2021-07-01 PROCEDURE — 83036 HEMOGLOBIN GLYCOSYLATED A1C: CPT

## 2021-07-01 PROCEDURE — 84443 ASSAY THYROID STIM HORMONE: CPT

## 2021-07-01 PROCEDURE — 99285 EMERGENCY DEPT VISIT HI MDM: CPT | Mod: 25

## 2021-07-01 PROCEDURE — 86304 IMMUNOASSAY TUMOR CA 125: CPT

## 2021-07-01 PROCEDURE — 82150 ASSAY OF AMYLASE: CPT

## 2021-07-01 PROCEDURE — 82803 BLOOD GASES ANY COMBINATION: CPT

## 2021-07-01 PROCEDURE — U0005: CPT

## 2021-07-01 PROCEDURE — 86704 HEP B CORE ANTIBODY TOTAL: CPT

## 2021-07-01 PROCEDURE — 85014 HEMATOCRIT: CPT

## 2021-07-01 PROCEDURE — 83605 ASSAY OF LACTIC ACID: CPT

## 2021-07-01 PROCEDURE — 87040 BLOOD CULTURE FOR BACTERIA: CPT

## 2021-07-01 PROCEDURE — 86900 BLOOD TYPING SEROLOGIC ABO: CPT

## 2021-07-01 PROCEDURE — 86706 HEP B SURFACE ANTIBODY: CPT

## 2021-07-01 PROCEDURE — 82378 CARCINOEMBRYONIC ANTIGEN: CPT

## 2021-07-01 PROCEDURE — C1729: CPT

## 2021-07-01 PROCEDURE — 71045 X-RAY EXAM CHEST 1 VIEW: CPT

## 2021-07-01 PROCEDURE — 88300 SURGICAL PATH GROSS: CPT | Mod: 26

## 2021-07-01 PROCEDURE — 87340 HEPATITIS B SURFACE AG IA: CPT

## 2021-07-01 PROCEDURE — 70450 CT HEAD/BRAIN W/O DYE: CPT

## 2021-07-01 PROCEDURE — 83735 ASSAY OF MAGNESIUM: CPT

## 2021-07-01 PROCEDURE — 84132 ASSAY OF SERUM POTASSIUM: CPT

## 2021-07-01 PROCEDURE — 86301 IMMUNOASSAY TUMOR CA 19-9: CPT

## 2021-07-01 PROCEDURE — 84439 ASSAY OF FREE THYROXINE: CPT

## 2021-07-01 PROCEDURE — 85730 THROMBOPLASTIN TIME PARTIAL: CPT

## 2021-07-01 PROCEDURE — 71045 X-RAY EXAM CHEST 1 VIEW: CPT | Mod: 26

## 2021-07-01 PROCEDURE — 88307 TISSUE EXAM BY PATHOLOGIST: CPT

## 2021-07-01 PROCEDURE — 82947 ASSAY GLUCOSE BLOOD QUANT: CPT

## 2021-07-01 PROCEDURE — 82962 GLUCOSE BLOOD TEST: CPT

## 2021-07-01 PROCEDURE — C1894: CPT

## 2021-07-01 PROCEDURE — 80061 LIPID PANEL: CPT

## 2021-07-01 PROCEDURE — C9803: CPT

## 2021-07-01 PROCEDURE — 86850 RBC ANTIBODY SCREEN: CPT

## 2021-07-01 PROCEDURE — C1751: CPT

## 2021-07-01 PROCEDURE — 76770 US EXAM ABDO BACK WALL COMP: CPT

## 2021-07-01 PROCEDURE — 87389 HIV-1 AG W/HIV-1&-2 AB AG IA: CPT

## 2021-07-01 PROCEDURE — 50432 PLMT NEPHROSTOMY CATHETER: CPT

## 2021-07-01 PROCEDURE — 85610 PROTHROMBIN TIME: CPT

## 2021-07-01 PROCEDURE — 84295 ASSAY OF SERUM SODIUM: CPT

## 2021-07-01 PROCEDURE — 87150 DNA/RNA AMPLIFIED PROBE: CPT

## 2021-07-01 PROCEDURE — 85025 COMPLETE CBC W/AUTO DIFF WBC: CPT

## 2021-07-01 PROCEDURE — 36569 INSJ PICC 5 YR+ W/O IMAGING: CPT

## 2021-07-01 PROCEDURE — 74018 RADEX ABDOMEN 1 VIEW: CPT | Mod: 26

## 2021-07-01 PROCEDURE — 84100 ASSAY OF PHOSPHORUS: CPT

## 2021-07-01 PROCEDURE — 93306 TTE W/DOPPLER COMPLETE: CPT

## 2021-07-01 PROCEDURE — 85027 COMPLETE CBC AUTOMATED: CPT

## 2021-07-01 PROCEDURE — 88305 TISSUE EXAM BY PATHOLOGIST: CPT

## 2021-07-01 PROCEDURE — 87186 SC STD MICRODIL/AGAR DIL: CPT

## 2021-07-01 PROCEDURE — 76000 FLUOROSCOPY <1 HR PHYS/QHP: CPT

## 2021-07-01 PROCEDURE — 86803 HEPATITIS C AB TEST: CPT

## 2021-07-01 PROCEDURE — U0003: CPT

## 2021-07-01 PROCEDURE — C1769: CPT

## 2021-07-01 PROCEDURE — 85018 HEMOGLOBIN: CPT

## 2021-07-01 PROCEDURE — 82330 ASSAY OF CALCIUM: CPT

## 2021-07-01 PROCEDURE — 74177 CT ABD & PELVIS W/CONTRAST: CPT

## 2021-07-01 PROCEDURE — 86709 HEPATITIS A IGM ANTIBODY: CPT

## 2021-07-01 PROCEDURE — 86901 BLOOD TYPING SEROLOGIC RH(D): CPT

## 2021-07-01 PROCEDURE — 83690 ASSAY OF LIPASE: CPT

## 2021-07-01 PROCEDURE — 81001 URINALYSIS AUTO W/SCOPE: CPT

## 2021-07-01 PROCEDURE — 80048 BASIC METABOLIC PNL TOTAL CA: CPT

## 2021-07-01 PROCEDURE — 86769 SARS-COV-2 COVID-19 ANTIBODY: CPT

## 2021-07-01 PROCEDURE — 82435 ASSAY OF BLOOD CHLORIDE: CPT

## 2021-07-01 PROCEDURE — 87086 URINE CULTURE/COLONY COUNT: CPT

## 2021-07-01 PROCEDURE — 74183 MRI ABD W/O CNTR FLWD CNTR: CPT

## 2021-07-01 RX ORDER — SODIUM CHLORIDE 9 MG/ML
1000 INJECTION, SOLUTION INTRAVENOUS
Refills: 0 | Status: DISCONTINUED | OUTPATIENT
Start: 2021-07-01 | End: 2021-07-03

## 2021-07-01 RX ORDER — SODIUM CHLORIDE 9 MG/ML
3 INJECTION INTRAMUSCULAR; INTRAVENOUS; SUBCUTANEOUS EVERY 8 HOURS
Refills: 0 | Status: DISCONTINUED | OUTPATIENT
Start: 2021-07-01 | End: 2021-07-01

## 2021-07-01 RX ORDER — HEPARIN SODIUM 5000 [USP'U]/ML
5000 INJECTION INTRAVENOUS; SUBCUTANEOUS EVERY 8 HOURS
Refills: 0 | Status: DISCONTINUED | OUTPATIENT
Start: 2021-07-01 | End: 2021-07-03

## 2021-07-01 RX ORDER — HYDROMORPHONE HYDROCHLORIDE 2 MG/ML
0.25 INJECTION INTRAMUSCULAR; INTRAVENOUS; SUBCUTANEOUS
Refills: 0 | Status: DISCONTINUED | OUTPATIENT
Start: 2021-07-01 | End: 2021-07-01

## 2021-07-01 RX ORDER — DEXTROSE 50 % IN WATER 50 %
25 SYRINGE (ML) INTRAVENOUS ONCE
Refills: 0 | Status: DISCONTINUED | OUTPATIENT
Start: 2021-07-01 | End: 2021-07-03

## 2021-07-01 RX ORDER — ONDANSETRON 8 MG/1
4 TABLET, FILM COATED ORAL ONCE
Refills: 0 | Status: DISCONTINUED | OUTPATIENT
Start: 2021-07-01 | End: 2021-07-01

## 2021-07-01 RX ORDER — INSULIN LISPRO 100/ML
VIAL (ML) SUBCUTANEOUS
Refills: 0 | Status: DISCONTINUED | OUTPATIENT
Start: 2021-07-01 | End: 2021-07-03

## 2021-07-01 RX ORDER — METFORMIN HYDROCHLORIDE 850 MG/1
1 TABLET ORAL
Qty: 0 | Refills: 0 | DISCHARGE

## 2021-07-01 RX ORDER — DEXTROSE 50 % IN WATER 50 %
12.5 SYRINGE (ML) INTRAVENOUS ONCE
Refills: 0 | Status: DISCONTINUED | OUTPATIENT
Start: 2021-07-01 | End: 2021-07-03

## 2021-07-01 RX ORDER — CEFAZOLIN SODIUM 1 G
2000 VIAL (EA) INJECTION EVERY 8 HOURS
Refills: 0 | Status: DISCONTINUED | OUTPATIENT
Start: 2021-07-01 | End: 2021-07-03

## 2021-07-01 RX ORDER — SODIUM CHLORIDE 9 MG/ML
1000 INJECTION, SOLUTION INTRAVENOUS
Refills: 0 | Status: DISCONTINUED | OUTPATIENT
Start: 2021-07-01 | End: 2021-07-02

## 2021-07-01 RX ORDER — DEXTROSE 50 % IN WATER 50 %
15 SYRINGE (ML) INTRAVENOUS ONCE
Refills: 0 | Status: DISCONTINUED | OUTPATIENT
Start: 2021-07-01 | End: 2021-07-03

## 2021-07-01 RX ORDER — GLUCAGON INJECTION, SOLUTION 0.5 MG/.1ML
1 INJECTION, SOLUTION SUBCUTANEOUS ONCE
Refills: 0 | Status: DISCONTINUED | OUTPATIENT
Start: 2021-07-01 | End: 2021-07-03

## 2021-07-01 RX ORDER — PIPERACILLIN AND TAZOBACTAM 4; .5 G/20ML; G/20ML
3.38 INJECTION, POWDER, LYOPHILIZED, FOR SOLUTION INTRAVENOUS EVERY 8 HOURS
Refills: 0 | Status: DISCONTINUED | OUTPATIENT
Start: 2021-07-01 | End: 2021-07-03

## 2021-07-01 RX ORDER — LIDOCAINE HCL 20 MG/ML
0.2 VIAL (ML) INJECTION ONCE
Refills: 0 | Status: DISCONTINUED | OUTPATIENT
Start: 2021-07-01 | End: 2021-07-01

## 2021-07-01 RX ADMIN — Medication 2: at 21:43

## 2021-07-01 RX ADMIN — HEPARIN SODIUM 5000 UNIT(S): 5000 INJECTION INTRAVENOUS; SUBCUTANEOUS at 21:44

## 2021-07-01 RX ADMIN — SODIUM CHLORIDE 125 MILLILITER(S): 9 INJECTION, SOLUTION INTRAVENOUS at 16:37

## 2021-07-01 RX ADMIN — Medication 1: at 16:51

## 2021-07-01 RX ADMIN — PIPERACILLIN AND TAZOBACTAM 25 GRAM(S): 4; .5 INJECTION, POWDER, LYOPHILIZED, FOR SOLUTION INTRAVENOUS at 21:49

## 2021-07-01 RX ADMIN — Medication 100 MILLIGRAM(S): at 22:52

## 2021-07-01 NOTE — PROGRESS NOTE ADULT - SUBJECTIVE AND OBJECTIVE BOX
Post op Check    Pt seen and examined at bedside. Denies SOB/CP/N/V.     Vital Signs Last 24 Hrs  T(C): 36.9 (01 Jul 2021 22:00), Max: 37.1 (01 Jul 2021 09:53)  T(F): 98.4 (01 Jul 2021 22:00), Max: 98.8 (01 Jul 2021 09:53)  HR: 85 (01 Jul 2021 22:00) (85 - 102)  BP: 103/62 (01 Jul 2021 22:00) (103/62 - 172/73)  BP(mean): 85 (01 Jul 2021 19:50) (79 - 112)  RR: 18 (01 Jul 2021 22:00) (16 - 18)  SpO2: 96% (01 Jul 2021 22:00) (92% - 99%)    I&O's Summary    01 Jul 2021 07:01  -  01 Jul 2021 22:31  --------------------------------------------------------  IN: 375 mL / OUT: 280 mL / NET: 95 mL        Physical Exam  Gen: NAD, A&Ox3  Pulm: No respiratory distress, no subcostal retractions  CV: RRR, no JVD  Abd: Soft, NT, ND  Back: left clayman draining fruit punch colored urine; dressing changed  : rosado in place with yellow urine                          12.0   16.75 )-----------( 207      ( 01 Jul 2021 16:35 )             38.0       07-01    135  |  97  |  14  ----------------------------<  181<H>  3.8   |  19<L>  |  1.10    Ca    9.4      01 Jul 2021 16:35        A/P: 65y Male s/p L PCNL, removal of previous IR left NT  DVT prophylaxis/OOB  Incentive spirometry  Strict I&O's  Analgesia and antiemetics as needed  Diet  AM labs    Kimberly Ascencio PA-C    Post op Check    Pt seen and examined at bedside. Denies SOB/CP/N/V.     Vital Signs Last 24 Hrs  T(C): 36.9 (01 Jul 2021 22:00), Max: 37.1 (01 Jul 2021 09:53)  T(F): 98.4 (01 Jul 2021 22:00), Max: 98.8 (01 Jul 2021 09:53)  HR: 85 (01 Jul 2021 22:00) (85 - 102)  BP: 103/62 (01 Jul 2021 22:00) (103/62 - 172/73)  BP(mean): 85 (01 Jul 2021 19:50) (79 - 112)  RR: 18 (01 Jul 2021 22:00) (16 - 18)  SpO2: 96% (01 Jul 2021 22:00) (92% - 99%)    I&O's Summary    01 Jul 2021 07:01  -  01 Jul 2021 22:31  --------------------------------------------------------  IN: 375 mL / OUT: 280 mL / NET: 95 mL        Physical Exam  Gen: NAD, A&Ox3  Pulm: No respiratory distress, no subcostal retractions  CV: RRR, no JVD  Abd: Soft, NT, ND  Back: left clayman draining fruit punch colored urine; dressing changed; no CVA tenderness  : rosado in place with yellow urine                          12.0   16.75 )-----------( 207      ( 01 Jul 2021 16:35 )             38.0       07-01    135  |  97  |  14  ----------------------------<  181<H>  3.8   |  19<L>  |  1.10    Ca    9.4      01 Jul 2021 16:35        A/P: 65y Male s/p L PCNL, removal of previous IR left NT  DVT prophylaxis/OOB  Incentive spirometry  Strict I&O's  Analgesia and antiemetics as needed  Diet  AM labs    Kimberly Ascencio PA-C

## 2021-07-01 NOTE — BRIEF OPERATIVE NOTE - OPERATION/FINDINGS
no cysto, antegrade nephrogram via IR NT, L percutaneous access, breakage and removal of two large renal stone, antegrade nephroscopy w/ basket stone extraction, removal of IR NT, placement of nephroureteral tube (arsalan) + surgiflow no cysto, antegrade nephrogram via IR NT, L percutaneous access, breakage and removal of two large renal stone, antegrade nephroscopy w/ basket stone extraction, removal of IR NT, placement of nephroureteral tube (arsalan) + [facility]    Dictation ID# no cysto, antegrade nephrogram via IR NT, L percutaneous access, breakage and removal of two large renal stone, antegrade nephroscopy w/ basket stone extraction, removal of IR NT, [facility]    Dictation ID# [id number]

## 2021-07-01 NOTE — BRIEF OPERATIVE NOTE - NSICDXBRIEFPROCEDURE_GEN_ALL_CORE_FT
PROCEDURES:  Cystoscopy with percutaneous nephrolithotomy, left 01-Jul-2021 16:08:09  Matthew Baron

## 2021-07-02 PROBLEM — R78.81 MSSA BACTEREMIA: Status: ACTIVE | Noted: 2021-05-31

## 2021-07-02 PROBLEM — Z09 HOSPITAL DISCHARGE FOLLOW-UP: Status: ACTIVE | Noted: 2021-06-22

## 2021-07-02 LAB
ANION GAP SERPL CALC-SCNC: 13 MMOL/L — SIGNIFICANT CHANGE UP (ref 5–17)
BUN SERPL-MCNC: 20 MG/DL — SIGNIFICANT CHANGE UP (ref 7–23)
CALCIUM SERPL-MCNC: 8.5 MG/DL — SIGNIFICANT CHANGE UP (ref 8.4–10.5)
CHLORIDE SERPL-SCNC: 98 MMOL/L — SIGNIFICANT CHANGE UP (ref 96–108)
CO2 SERPL-SCNC: 22 MMOL/L — SIGNIFICANT CHANGE UP (ref 22–31)
CREAT SERPL-MCNC: 1.48 MG/DL — HIGH (ref 0.5–1.3)
GLUCOSE BLDC GLUCOMTR-MCNC: 137 MG/DL — HIGH (ref 70–99)
GLUCOSE BLDC GLUCOMTR-MCNC: 156 MG/DL — HIGH (ref 70–99)
GLUCOSE BLDC GLUCOMTR-MCNC: 173 MG/DL — HIGH (ref 70–99)
GLUCOSE BLDC GLUCOMTR-MCNC: 177 MG/DL — HIGH (ref 70–99)
GLUCOSE SERPL-MCNC: 119 MG/DL — HIGH (ref 70–99)
HCT VFR BLD CALC: 28.5 % — LOW (ref 39–50)
HCT VFR BLD CALC: 29 % — LOW (ref 39–50)
HGB BLD-MCNC: 9.3 G/DL — LOW (ref 13–17)
HGB BLD-MCNC: 9.6 G/DL — LOW (ref 13–17)
MCHC RBC-ENTMCNC: 27.4 PG — SIGNIFICANT CHANGE UP (ref 27–34)
MCHC RBC-ENTMCNC: 27.7 PG — SIGNIFICANT CHANGE UP (ref 27–34)
MCHC RBC-ENTMCNC: 32.6 GM/DL — SIGNIFICANT CHANGE UP (ref 32–36)
MCHC RBC-ENTMCNC: 33.1 GM/DL — SIGNIFICANT CHANGE UP (ref 32–36)
MCV RBC AUTO: 83.6 FL — SIGNIFICANT CHANGE UP (ref 80–100)
MCV RBC AUTO: 84.1 FL — SIGNIFICANT CHANGE UP (ref 80–100)
NRBC # BLD: 0 /100 WBCS — SIGNIFICANT CHANGE UP (ref 0–0)
NRBC # BLD: 0 /100 WBCS — SIGNIFICANT CHANGE UP (ref 0–0)
PLATELET # BLD AUTO: 164 K/UL — SIGNIFICANT CHANGE UP (ref 150–400)
PLATELET # BLD AUTO: 183 K/UL — SIGNIFICANT CHANGE UP (ref 150–400)
POTASSIUM SERPL-MCNC: 3.2 MMOL/L — LOW (ref 3.5–5.3)
POTASSIUM SERPL-SCNC: 3.2 MMOL/L — LOW (ref 3.5–5.3)
RBC # BLD: 3.39 M/UL — LOW (ref 4.2–5.8)
RBC # BLD: 3.47 M/UL — LOW (ref 4.2–5.8)
RBC # FLD: 15.2 % — HIGH (ref 10.3–14.5)
RBC # FLD: 15.4 % — HIGH (ref 10.3–14.5)
SODIUM SERPL-SCNC: 133 MMOL/L — LOW (ref 135–145)
WBC # BLD: 8.69 K/UL — SIGNIFICANT CHANGE UP (ref 3.8–10.5)
WBC # BLD: 8.98 K/UL — SIGNIFICANT CHANGE UP (ref 3.8–10.5)
WBC # FLD AUTO: 8.69 K/UL — SIGNIFICANT CHANGE UP (ref 3.8–10.5)
WBC # FLD AUTO: 8.98 K/UL — SIGNIFICANT CHANGE UP (ref 3.8–10.5)

## 2021-07-02 RX ORDER — SODIUM CHLORIDE 9 MG/ML
1000 INJECTION, SOLUTION INTRAVENOUS
Refills: 0 | Status: DISCONTINUED | OUTPATIENT
Start: 2021-07-02 | End: 2021-07-03

## 2021-07-02 RX ORDER — ONDANSETRON 8 MG/1
4 TABLET, FILM COATED ORAL EVERY 4 HOURS
Refills: 0 | Status: DISCONTINUED | OUTPATIENT
Start: 2021-07-02 | End: 2021-07-03

## 2021-07-02 RX ORDER — ONDANSETRON 8 MG/1
4 TABLET, FILM COATED ORAL EVERY 6 HOURS
Refills: 0 | Status: DISCONTINUED | OUTPATIENT
Start: 2021-07-02 | End: 2021-07-02

## 2021-07-02 RX ORDER — POTASSIUM CHLORIDE 20 MEQ
20 PACKET (EA) ORAL
Refills: 0 | Status: COMPLETED | OUTPATIENT
Start: 2021-07-02 | End: 2021-07-02

## 2021-07-02 RX ADMIN — HEPARIN SODIUM 5000 UNIT(S): 5000 INJECTION INTRAVENOUS; SUBCUTANEOUS at 22:05

## 2021-07-02 RX ADMIN — Medication 1: at 12:54

## 2021-07-02 RX ADMIN — Medication 1: at 22:05

## 2021-07-02 RX ADMIN — SODIUM CHLORIDE 75 MILLILITER(S): 9 INJECTION, SOLUTION INTRAVENOUS at 15:41

## 2021-07-02 RX ADMIN — HEPARIN SODIUM 5000 UNIT(S): 5000 INJECTION INTRAVENOUS; SUBCUTANEOUS at 13:04

## 2021-07-02 RX ADMIN — PIPERACILLIN AND TAZOBACTAM 25 GRAM(S): 4; .5 INJECTION, POWDER, LYOPHILIZED, FOR SOLUTION INTRAVENOUS at 12:58

## 2021-07-02 RX ADMIN — Medication 100 MILLIGRAM(S): at 22:04

## 2021-07-02 RX ADMIN — ONDANSETRON 4 MILLIGRAM(S): 8 TABLET, FILM COATED ORAL at 19:19

## 2021-07-02 RX ADMIN — Medication 20 MILLIEQUIVALENT(S): at 12:53

## 2021-07-02 RX ADMIN — HEPARIN SODIUM 5000 UNIT(S): 5000 INJECTION INTRAVENOUS; SUBCUTANEOUS at 05:29

## 2021-07-02 RX ADMIN — Medication 20 MILLIEQUIVALENT(S): at 10:32

## 2021-07-02 RX ADMIN — PIPERACILLIN AND TAZOBACTAM 25 GRAM(S): 4; .5 INJECTION, POWDER, LYOPHILIZED, FOR SOLUTION INTRAVENOUS at 22:05

## 2021-07-02 RX ADMIN — Medication 100 MILLIGRAM(S): at 12:58

## 2021-07-02 RX ADMIN — SODIUM CHLORIDE 75 MILLILITER(S): 9 INJECTION, SOLUTION INTRAVENOUS at 22:05

## 2021-07-02 RX ADMIN — PIPERACILLIN AND TAZOBACTAM 25 GRAM(S): 4; .5 INJECTION, POWDER, LYOPHILIZED, FOR SOLUTION INTRAVENOUS at 05:26

## 2021-07-02 RX ADMIN — SODIUM CHLORIDE 125 MILLILITER(S): 9 INJECTION, SOLUTION INTRAVENOUS at 02:07

## 2021-07-02 RX ADMIN — Medication 100 MILLIGRAM(S): at 05:23

## 2021-07-02 NOTE — PROGRESS NOTE ADULT - SUBJECTIVE AND OBJECTIVE BOX
UROLOGY DAILY PROGRESS NOTE:     Subjective: Patient seen and examined at bedside. No overnight events.       Objective:  Vital signs  T(F): , Max: 99.5 (07-01-21 @ 22:53)  HR: 79 (07-02-21 @ 05:00)  BP: 118/68 (07-02-21 @ 05:00)  SpO2: 95% (07-02-21 @ 05:00)  Wt(kg): --    I&O's Summary    01 Jul 2021 07:01  -  02 Jul 2021 07:00  --------------------------------------------------------  IN: 2125 mL / OUT: 705 mL / NET: 1420 mL    I&O's Detail    01 Jul 2021 07:01  -  02 Jul 2021 07:00  --------------------------------------------------------  IN:    IV PiggyBack: 300 mL    Lactated Ringers: 1625 mL    Oral Fluid: 200 mL  Total IN: 2125 mL    OUT:    Indwelling Catheter - Urethral (mL): 380 mL    Nephrostomy Tube (mL): 325 mL  Total OUT: 705 mL    Total NET: 1420 mL          Gen: NAD  Pulm: No respiratory distress, no subcostal retractions  CV: RRR, no JVD  Abd: Soft, NT, ND  Back: L NT- red urine  : Espinoza clear urine     Labs:  07-02  x     / x     /1.48   07-01  16.75 / 38.0  /1.10                           12.0   16.75 )-----------( 207      ( 01 Jul 2021 16:35 )             38.0     07-02    133<L>  |  98  |  20  ----------------------------<  119<H>  3.2<L>   |  22  |  1.48<H>    Ca    8.5      02 Jul 2021 06:30          Urine Cx:

## 2021-07-02 NOTE — ASSESSMENT
[FreeTextEntry1] : 66 y/o male called for followup visit. \par \par He is stable and doing well. Complete total 4 weeks iv abx. \par \par Advised to get surveillance blood cx post iv abx. \par \par Monitor for fever or chills. \par \par Told to call back if any issues arise. \par \par D/w daughter also over phone.

## 2021-07-02 NOTE — HISTORY OF PRESENT ILLNESS
[FreeTextEntry1] : 64 y/o male called for followup visit for MSSA UTI and bacteremia.\par \par He was at [facility] and DC with midline and iv abx for MSSA UTI/bacteremia \par \par Pt doing well.  No fever. IV line ok.\par \par Nephrostomy in place. \par \par Had nausea earlier this month but improved with zofran.\par \par \par \par

## 2021-07-02 NOTE — REASON FOR VISIT
[Home] : at home, [unfilled] , at the time of the visit. [Medical Office: ([facility])___] : at the medical office located in  [Verbal consent obtained from patient] : the patient, [unfilled] [Post Hospitalization] : a post hospitalization visit [Family Member] : family member

## 2021-07-03 ENCOUNTER — TRANSCRIPTION ENCOUNTER (OUTPATIENT)
Age: 66
End: 2021-07-03

## 2021-07-03 VITALS
RESPIRATION RATE: 18 BRPM | OXYGEN SATURATION: 97 % | TEMPERATURE: 99 F | DIASTOLIC BLOOD PRESSURE: 75 MMHG | HEART RATE: 99 BPM | SYSTOLIC BLOOD PRESSURE: 135 MMHG

## 2021-07-03 LAB
ANION GAP SERPL CALC-SCNC: 18 MMOL/L — HIGH (ref 5–17)
BUN SERPL-MCNC: 12 MG/DL — SIGNIFICANT CHANGE UP (ref 7–23)
CALCIUM SERPL-MCNC: 8.8 MG/DL — SIGNIFICANT CHANGE UP (ref 8.4–10.5)
CHLORIDE SERPL-SCNC: 99 MMOL/L — SIGNIFICANT CHANGE UP (ref 96–108)
CO2 SERPL-SCNC: 20 MMOL/L — LOW (ref 22–31)
CREAT SERPL-MCNC: 1.23 MG/DL — SIGNIFICANT CHANGE UP (ref 0.5–1.3)
GLUCOSE BLDC GLUCOMTR-MCNC: 174 MG/DL — HIGH (ref 70–99)
GLUCOSE SERPL-MCNC: 171 MG/DL — HIGH (ref 70–99)
HCT VFR BLD CALC: 31.8 % — LOW (ref 39–50)
HGB BLD-MCNC: 10.3 G/DL — LOW (ref 13–17)
MCHC RBC-ENTMCNC: 27.4 PG — SIGNIFICANT CHANGE UP (ref 27–34)
MCHC RBC-ENTMCNC: 32.4 GM/DL — SIGNIFICANT CHANGE UP (ref 32–36)
MCV RBC AUTO: 84.6 FL — SIGNIFICANT CHANGE UP (ref 80–100)
NRBC # BLD: 0 /100 WBCS — SIGNIFICANT CHANGE UP (ref 0–0)
PLATELET # BLD AUTO: 187 K/UL — SIGNIFICANT CHANGE UP (ref 150–400)
POTASSIUM SERPL-MCNC: 3.5 MMOL/L — SIGNIFICANT CHANGE UP (ref 3.5–5.3)
POTASSIUM SERPL-SCNC: 3.5 MMOL/L — SIGNIFICANT CHANGE UP (ref 3.5–5.3)
RBC # BLD: 3.76 M/UL — LOW (ref 4.2–5.8)
RBC # FLD: 14.9 % — HIGH (ref 10.3–14.5)
SARS-COV-2 RNA SPEC QL NAA+PROBE: SIGNIFICANT CHANGE UP
SODIUM SERPL-SCNC: 137 MMOL/L — SIGNIFICANT CHANGE UP (ref 135–145)
WBC # BLD: 10.75 K/UL — HIGH (ref 3.8–10.5)
WBC # FLD AUTO: 10.75 K/UL — HIGH (ref 3.8–10.5)

## 2021-07-03 PROCEDURE — 85027 COMPLETE CBC AUTOMATED: CPT

## 2021-07-03 PROCEDURE — C1887: CPT

## 2021-07-03 PROCEDURE — U0005: CPT

## 2021-07-03 PROCEDURE — 88300 SURGICAL PATH GROSS: CPT

## 2021-07-03 PROCEDURE — C1758: CPT

## 2021-07-03 PROCEDURE — C1769: CPT

## 2021-07-03 PROCEDURE — 80048 BASIC METABOLIC PNL TOTAL CA: CPT

## 2021-07-03 PROCEDURE — C1726: CPT

## 2021-07-03 PROCEDURE — 82365 CALCULUS SPECTROSCOPY: CPT

## 2021-07-03 PROCEDURE — 74018 RADEX ABDOMEN 1 VIEW: CPT

## 2021-07-03 PROCEDURE — 82962 GLUCOSE BLOOD TEST: CPT

## 2021-07-03 PROCEDURE — U0003: CPT

## 2021-07-03 PROCEDURE — C1889: CPT

## 2021-07-03 PROCEDURE — 87040 BLOOD CULTURE FOR BACTERIA: CPT

## 2021-07-03 PROCEDURE — 71045 X-RAY EXAM CHEST 1 VIEW: CPT

## 2021-07-03 PROCEDURE — C9399: CPT

## 2021-07-03 PROCEDURE — 99238 HOSP IP/OBS DSCHRG MGMT 30/<: CPT

## 2021-07-03 RX ORDER — AZTREONAM 2 G
1 VIAL (EA) INJECTION
Qty: 6 | Refills: 0
Start: 2021-07-03 | End: 2021-07-05

## 2021-07-03 RX ADMIN — ONDANSETRON 4 MILLIGRAM(S): 8 TABLET, FILM COATED ORAL at 05:21

## 2021-07-03 RX ADMIN — PIPERACILLIN AND TAZOBACTAM 25 GRAM(S): 4; .5 INJECTION, POWDER, LYOPHILIZED, FOR SOLUTION INTRAVENOUS at 05:21

## 2021-07-03 RX ADMIN — Medication 100 MILLIGRAM(S): at 05:21

## 2021-07-03 RX ADMIN — Medication 1: at 08:31

## 2021-07-03 RX ADMIN — HEPARIN SODIUM 5000 UNIT(S): 5000 INJECTION INTRAVENOUS; SUBCUTANEOUS at 05:21

## 2021-07-03 NOTE — DISCHARGE NOTE NURSING/CASE MANAGEMENT/SOCIAL WORK - NSDCPETBCESMAN_GEN_ALL_CORE
Problem: Diabetes, Type 2 (Adult)  Goal: Signs and Symptoms of Listed Potential Problems Will be Absent or Manageable (Diabetes, Type 2)  Outcome: Ongoing (interventions implemented as appropriate)       If you are a smoker, it is important for your health to stop smoking. Please be aware that second hand smoke is also harmful.

## 2021-07-03 NOTE — DISCHARGE NOTE PROVIDER - NSDCMRMEDTOKEN_GEN_ALL_CORE_FT
Bactrim  mg-160 mg oral tablet: 1 tab(s) orally every 12 hours   metFORMIN 500 mg oral tablet: 1 tab(s) orally 2 times a day   Bactrim  mg-160 mg oral tablet: 1 tab(s) orally every 12 hours   metFORMIN 500 mg oral tablet: 1 tab(s) orally 2 times a day  oxycodone-acetaminophen 5 mg-325 mg oral tablet: 1 tab(s) orally every 6 hours, As Needed -for severe pain MDD:4 tabs

## 2021-07-03 NOTE — DISCHARGE NOTE PROVIDER - HOSPITAL COURSE
66 y/o male PMHx of nephrolithiasis, T2DM and newly diagnosed pancreatic carcinoma underwent a left PCNL and removal of previous left NT on 7/1/21. Patient tolerated procedure well. Please see operative report  for further details. Espinoza was removed postop day 1, pt voided with minimal post void residual. Nephrostomy tube was removed Post op day #2. Dressing applied. Upon discharge pt remained afebrile, voiding, tolerating diet, and pain well managed. Pt was discharged with bactrim, percocet 5/325mg and stool softeners. Pt will follow up with Dr. Hoenig next week. 66 y/o male PMHx of nephrolithiasis, T2DM and newly diagnosed pancreatic carcinoma underwent a left PCNL and removal of previous left NT on 7/1/21. Patient tolerated procedure well. Please see operative report  for further details. Espinoza was removed postop day 1, pt voided with minimal post void residual. Nephrostomy tube was removed Post op day #2. Dressing applied. Upon discharge pt remained afebrile, voiding, tolerating diet, and pain well managed. Pt was discharged with bactrim, percocet 5/325mg and stool softeners. Pt will follow up with Dr. Hoenig next week.     Pt requested repeat covid test before being discharged but did not wish to wait for results. RN will call him with results. 64 y/o male PMHx of nephrolithiasis, T2DM and newly diagnosed pancreatic carcinoma underwent a left PCNL and removal of previous left NT on 7/1/21. Patient tolerated procedure well. Please see operative report  for further details. Espinoza was removed postop day 1, pt voided with minimal post void residual. Nephrostomy tube was removed Post op day #2. Dressing applied. Upon discharge pt remained afebrile, voiding, tolerating diet, and pain well managed. Pt was discharged with bactrim, percocet 5/325mg and stool softeners. Pt will follow up with Dr. Hoenig next week.

## 2021-07-03 NOTE — DISCHARGE NOTE PROVIDER - NSDCCPTREATMENT_GEN_ALL_CORE_FT
PRINCIPAL PROCEDURE  Procedure: Cystoscopy with percutaneous nephrolithotomy, left  Findings and Treatment:

## 2021-07-03 NOTE — DISCHARGE NOTE NURSING/CASE MANAGEMENT/SOCIAL WORK - REASON VACCINE NOT RECEIVED
pt states he wants the vaccine but wants to wait 2-3 after he is discharged when he is feeling "100%".  pt states he wants the vaccine but wants to wait 2-3 days after he is discharged when he is feeling "100%".

## 2021-07-03 NOTE — DISCHARGE NOTE PROVIDER - CARE PROVIDERS DIRECT ADDRESSES
[email or web address]

## 2021-07-03 NOTE — DISCHARGE NOTE PROVIDER - NSDCFUADDINST_GEN_ALL_CORE_FT
It is common to have blood in the urine after your procedure.  It may be pink or even red; inform your doctor if you have a significant amount of clots in the urine      You may experience drainage from your incision, you may change the gauze and secure with tape as needed. The incision will close on its own in a few days.     -Provided that you are not restricted with fluids by your physician, you should drink 6-8 (8 oz.) glasses of fluid per day.  -You may resume your regular diet and regular medication regimen.    -You may shower.    -You may take over the counter pain medications such as Tylenol, do not exceed 4 grams daily.   -You may take over the counter stool softeners/laxatives such as Senna, Miralax, Dulcolax for constipation and avoid straining   - No heavy lifting greater than 10lbs  -Return to daily living activities slowly as tolerated.  -Make a follow up appointment for with your urologist when you arrive home   -Call your urologist during normal business hours with any other routine questions.    Please call the office or go to ER if:   • Fever of 101 degrees Fahrenheit or higher.  • Pain not relieved by oral medication  • Inability to tolerate food or liquids  • If you are unable to void.  • Redness around incision site that is spreading or getting larger  • Discolored or foul-smelling discharge (pus) from wound site

## 2021-07-03 NOTE — DISCHARGE NOTE PROVIDER - CARE PROVIDER_API CALL
Hoenig, David M (MD)  Urology  [facility]- Dept. of Urology, [address] [place] [address]  Phone: [phone]  Fax: [phone]  Follow Up Time: 1 week

## 2021-07-03 NOTE — PROGRESS NOTE ADULT - SUBJECTIVE AND OBJECTIVE BOX
UROLOGY DAILY PROGRESS NOTE:     Subjective: Patient seen and examined at bedside. No overnight events.     Objective:  Vital signs  T(F): , Max: 98.7 (07-03-21 @ 05:12)  HR: 98 (07-03-21 @ 05:12)  BP: 138/79 (07-03-21 @ 05:12)  SpO2: 95% (07-03-21 @ 05:12)     02 Jul 2021 07:01  -  03 Jul 2021 07:00  --------------------------------------------------------  IN: 1890 mL / OUT: 1925 mL / NET: -35 mL    03 Jul 2021 07:01  -  03 Jul 2021 08:33  --------------------------------------------------------  IN: 0 mL / OUT: 200 mL / NET: -200 mL    Gen: NAD  Pulm: No respiratory distress, no subcostal retractions  CV: RRR, no JVD  Abd: Soft, NT, ND  : voiding     Labs:  07-03  10.75 / 31.8  /x      07-03  x     / x     /1.23                           10.3   10.75 )-----------( 187      ( 03 Jul 2021 07:21 )             31.8     07-03    137  |  99  |  12  ----------------------------<  171<H>  3.5   |  20<L>  |  1.23    Ca    8.8      03 Jul 2021 07:17

## 2021-07-03 NOTE — DISCHARGE NOTE PROVIDER - NSDCCPCAREPLAN_GEN_ALL_CORE_FT
PRINCIPAL DISCHARGE DIAGNOSIS  Diagnosis: Left nephrolithiasis  Assessment and Plan of Treatment: Your stone was removed      SECONDARY DISCHARGE DIAGNOSES  Diagnosis: Pancreatic cancer  Assessment and Plan of Treatment: Follow up with your oncologist    Diagnosis: Diabetes mellitus  Assessment and Plan of Treatment: Continue home regimen

## 2021-07-03 NOTE — DISCHARGE NOTE NURSING/CASE MANAGEMENT/SOCIAL WORK - PATIENT PORTAL LINK FT
You can access the FollowMyHealth Patient Portal offered by [facility] by registering at the following website: [email or web address]. By joining [employer]’s FollowMyHealth portal, you will also be able to view your health information using other applications (apps) compatible with our system.

## 2021-07-03 NOTE — PROGRESS NOTE ADULT - ASSESSMENT
A/P: 65y Male s/p L PCNL, removal of previous IR left NT  DVT prophylaxis/OOB  Incentive spirometry  Strict I&O's  Analgesia and antiemetics as needed  Diet  AM labs  TOV
A/P: 65y Male s/p L PCNL, removal of previous IR left NT    -DVT PPx  -NT removed without incident   -Pt requesting covid test prior to discharge-ordered  -Discharge planning for today

## 2021-07-06 ENCOUNTER — APPOINTMENT (OUTPATIENT)
Dept: INFECTIOUS DISEASE | Facility: CLINIC | Age: 66
End: 2021-07-06

## 2021-07-06 PROBLEM — C25.9 MALIGNANT NEOPLASM OF PANCREAS, UNSPECIFIED: Chronic | Status: ACTIVE | Noted: 2021-06-24

## 2021-07-07 LAB
CULTURE RESULTS: SIGNIFICANT CHANGE UP
CULTURE RESULTS: SIGNIFICANT CHANGE UP
NIDUS STONE QN: SIGNIFICANT CHANGE UP
SPECIMEN SOURCE: SIGNIFICANT CHANGE UP
SPECIMEN SOURCE: SIGNIFICANT CHANGE UP
SURGICAL PATHOLOGY STUDY: SIGNIFICANT CHANGE UP

## 2021-07-16 ENCOUNTER — APPOINTMENT (OUTPATIENT)
Dept: NUCLEAR MEDICINE | Facility: IMAGING CENTER | Age: 66
End: 2021-07-16

## 2021-07-20 ENCOUNTER — APPOINTMENT (OUTPATIENT)
Dept: UROLOGY | Facility: CLINIC | Age: 66
End: 2021-07-20
Payer: MEDICARE

## 2021-07-20 PROCEDURE — 99442: CPT | Mod: 24

## 2021-07-20 NOTE — ASSESSMENT
[FreeTextEntry1] : Diet modification reviewed at length- increasing fluids (primarily water), citrus is good, and decreasing/moderating salt, animal flesh protein, oxalate containing foods, and moderation of calcium intake (1000 mg/day is USRDA).\par \par I reviewed with the patient the risks of metabolic stone disease given their underlying risk parameters (all of which include large stones, multiple stones, bilateral stones, family history, and young age), and the indications for 24 hour urine metabolic assessment.\par \par We also discussed benefits of regular exercise and weight loss as independent risk reducers for stones.\par \par 1. Diet modification as discussed\par 2. 24 hour urine collection x 2 in the next few weeks\par 3. RTC with renal US in 8 to 10 weeks\par 4. Pt instructed to self test urine pH, 2 to 3 times per day using pH paper, and to record results over next ~ 1 week.\par \par Target pH range for UA stone prevention is between 6-7\par \par If pH < 6, pt to call and will expect/need to titrate up.\par

## 2021-07-20 NOTE — HISTORY OF PRESENT ILLNESS
[Home] : at home, [unfilled] , at the time of the visit. [Other Location: e.g. Home (Enter Location, City,State)___] : at [unfilled] [Family Member] : family member [Verbal consent obtained from patient] : the patient, [unfilled] [FreeTextEntry4] : Daughters [FreeTextEntry1] : The patient-doctor relationship has been established in a face to face fashion via real time video/audio HIPAA compliant communication using telemedicine software. The patient's identity has been confirmed. The patient was previously emailed a copy of the telemedicine consent. They have had a chance to review and has now given verbal consent and has requested care to be assessed and treated via telemedicine. They understand there may be limitations in this process, and that they may need further followup care in the office and/or hospital settings.\par \par Verbal consent given on Jul 20 2021 12:00PM\par \par TSERING SCANLON is a 65 year M who presents for Pt returns for metabolic evaluation and prevention of future stone disease following recent surgery for stone.  At issue today is review of the stone analysis, risk factors for future stone episodes and establishing whether they have pure dietary, metabolic, or mixed causes for their stone risks which is unrelated to the surgical management of their stone disease.\par \par They underwent left PCNL on 7/8/21\par \par Stone analysis: 100% Uric acid\par \par Doing well; slower stream than usual. Feels emptying well, does not always feel full. To deal with pancreas upcoming. No hematuria, no flank pain.\par \par 07/20/2021 \par \par The patient denies fevers, chills, nausea and/or vomiting, and no unexplained weight loss.\par \par All pertinent parts of the patient PFSH (past medical, family, and social histories), laboratory, radiological studies and available physician notes were reviewed prior to starting the face-to-face portion of the telemedicine visit. Questionnaire results, where appropriate, were discussed with the patient.\par

## 2021-07-22 ENCOUNTER — APPOINTMENT (OUTPATIENT)
Dept: UROLOGY | Facility: CLINIC | Age: 66
End: 2021-07-22
Payer: MEDICAID

## 2021-07-22 VITALS
SYSTOLIC BLOOD PRESSURE: 121 MMHG | BODY MASS INDEX: 28.35 KG/M2 | HEIGHT: 70 IN | OXYGEN SATURATION: 98 % | HEART RATE: 106 BPM | WEIGHT: 198 LBS | DIASTOLIC BLOOD PRESSURE: 81 MMHG

## 2021-07-22 PROCEDURE — 99214 OFFICE O/P EST MOD 30 MIN: CPT | Mod: 24

## 2021-07-22 NOTE — PHYSICAL EXAM
[General Appearance - Well Developed] : well developed [General Appearance - Well Nourished] : well nourished [Normal Appearance] : normal appearance [Well Groomed] : well groomed [General Appearance - In No Acute Distress] : no acute distress [Abdomen Soft] : soft [Abdomen Tenderness] : non-tender [Costovertebral Angle Tenderness] : no ~M costovertebral angle tenderness [Urinary Bladder Findings] : the bladder was normal on palpation [] : no respiratory distress [Respiration, Rhythm And Depth] : normal respiratory rhythm and effort [Exaggerated Use Of Accessory Muscles For Inspiration] : no accessory muscle use [Oriented To Time, Place, And Person] : oriented to person, place, and time [Affect] : the affect was normal [Mood] : the mood was normal [Not Anxious] : not anxious [Normal Station and Gait] : the gait and station were normal for the patient's age [No Focal Deficits] : no focal deficits [FreeTextEntry1] : well healed left nephrostomy site

## 2021-07-22 NOTE — HISTORY OF PRESENT ILLNESS
[FreeTextEntry1] : Pt returns for metabolic evaluation and prevention of future stone disease following recent surgery for stone.  At issue today is review of the stone analysis, risk factors for future stone episodes and establishing whether they have pure dietary, metabolic, or mixed causes for their stone risks which is unrelated to the surgical management of their stone disease.\par \par They underwent left PCNL on 7/1/21\par \par Stone analysis: 100% Uric acid\par \par Did well. All tubes removed during hosp.\par \par Op note reviewed.\par \par No flank or abdominal pain. No fever. No dysuria or hematuria.\par \par Was to have mediport, but delayed as [facility] did repeat CT scan with reported ? "abscess left kidney" along percutaneous tract.\par  [None] : no symptoms

## 2021-07-22 NOTE — ASSESSMENT
[FreeTextEntry1] : Diet modification reviewed at length- increasing fluids (primarily water), citrus is good, and decreasing/moderating salt, animal flesh protein, oxalate containing foods, and moderation of calcium intake (1000 mg/day is USRDA).\par \par I reviewed with the patient the risks of metabolic stone disease given their underlying risk parameters (all of which include large stones, multiple stones, bilateral stones, family history, and young age), and the indications for 24 hour urine metabolic assessment.\par \par We also discussed benefits of regular exercise and weight loss as independent risk reducers for stones.\par \par 1. Diet modification as discussed\par 2. 24 hour urine collection x 2 in the next few weeks as other care for pancreas permits\par 3. will plan for RTC with renal US in 8 to 10 weeks\par 4. start pot citrate- \par 5. check BMP in several weeks \par \par (Prior gfr 61, create 1.23- likely no sig issues with alkalinization\par 6. will need to review CT scan [facility] for size, location, appearance- if needs to be drained, would arrange with IR asap to attempt to avoid further delay on med-onc treatment-- daughter to get the disc for my review asap

## 2021-07-25 ENCOUNTER — INPATIENT (INPATIENT)
Facility: HOSPITAL | Age: 66
LOS: 0 days | Discharge: HOME | DRG: 690 | End: 2021-07-26
Attending: UROLOGY | Admitting: UROLOGY
Payer: MEDICARE

## 2021-07-25 VITALS
DIASTOLIC BLOOD PRESSURE: 83 MMHG | HEIGHT: 71 IN | OXYGEN SATURATION: 98 % | WEIGHT: 197.98 LBS | SYSTOLIC BLOOD PRESSURE: 128 MMHG | HEART RATE: 110 BPM | RESPIRATION RATE: 17 BRPM | TEMPERATURE: 98 F

## 2021-07-25 DIAGNOSIS — N15.1 RENAL AND PERINEPHRIC ABSCESS: ICD-10-CM

## 2021-07-25 DIAGNOSIS — Z43.6 ENCOUNTER FOR ATTENTION TO OTHER ARTIFICIAL OPENINGS OF URINARY TRACT: Chronic | ICD-10-CM

## 2021-07-25 LAB
ALBUMIN SERPL ELPH-MCNC: 3.9 G/DL — SIGNIFICANT CHANGE UP (ref 3.3–5)
ALP SERPL-CCNC: 142 U/L — HIGH (ref 40–120)
ALT FLD-CCNC: 21 U/L — SIGNIFICANT CHANGE UP (ref 10–45)
ANION GAP SERPL CALC-SCNC: 17 MMOL/L — SIGNIFICANT CHANGE UP (ref 5–17)
APPEARANCE UR: CLEAR — SIGNIFICANT CHANGE UP
APTT BLD: 31.2 SEC — SIGNIFICANT CHANGE UP (ref 27.5–35.5)
AST SERPL-CCNC: 17 U/L — SIGNIFICANT CHANGE UP (ref 10–40)
BACTERIA # UR AUTO: NEGATIVE — SIGNIFICANT CHANGE UP
BASOPHILS # BLD AUTO: 0.05 K/UL — SIGNIFICANT CHANGE UP (ref 0–0.2)
BASOPHILS NFR BLD AUTO: 0.6 % — SIGNIFICANT CHANGE UP (ref 0–2)
BILIRUB SERPL-MCNC: 0.6 MG/DL — SIGNIFICANT CHANGE UP (ref 0.2–1.2)
BILIRUB UR-MCNC: NEGATIVE — SIGNIFICANT CHANGE UP
BLD GP AB SCN SERPL QL: NEGATIVE — SIGNIFICANT CHANGE UP
BUN SERPL-MCNC: 12 MG/DL — SIGNIFICANT CHANGE UP (ref 7–23)
CALCIUM SERPL-MCNC: 9.6 MG/DL — SIGNIFICANT CHANGE UP (ref 8.4–10.5)
CHLORIDE SERPL-SCNC: 98 MMOL/L — SIGNIFICANT CHANGE UP (ref 96–108)
CO2 SERPL-SCNC: 21 MMOL/L — LOW (ref 22–31)
COLOR SPEC: SIGNIFICANT CHANGE UP
CREAT SERPL-MCNC: 0.9 MG/DL — SIGNIFICANT CHANGE UP (ref 0.5–1.3)
DIFF PNL FLD: NEGATIVE — SIGNIFICANT CHANGE UP
EOSINOPHIL # BLD AUTO: 0.45 K/UL — SIGNIFICANT CHANGE UP (ref 0–0.5)
EOSINOPHIL NFR BLD AUTO: 5.3 % — SIGNIFICANT CHANGE UP (ref 0–6)
EPI CELLS # UR: 2 /HPF — SIGNIFICANT CHANGE UP
GLUCOSE BLDC GLUCOMTR-MCNC: 160 MG/DL — HIGH (ref 70–99)
GLUCOSE BLDC GLUCOMTR-MCNC: 176 MG/DL — HIGH (ref 70–99)
GLUCOSE SERPL-MCNC: 220 MG/DL — HIGH (ref 70–99)
GLUCOSE UR QL: NEGATIVE — SIGNIFICANT CHANGE UP
HCT VFR BLD CALC: 34.7 % — LOW (ref 39–50)
HGB BLD-MCNC: 11.5 G/DL — LOW (ref 13–17)
HYALINE CASTS # UR AUTO: 2 /LPF — SIGNIFICANT CHANGE UP (ref 0–2)
IMM GRANULOCYTES NFR BLD AUTO: 0.4 % — SIGNIFICANT CHANGE UP (ref 0–1.5)
INR BLD: 1.18 RATIO — HIGH (ref 0.88–1.16)
KETONES UR-MCNC: NEGATIVE — SIGNIFICANT CHANGE UP
LEUKOCYTE ESTERASE UR-ACNC: NEGATIVE — SIGNIFICANT CHANGE UP
LYMPHOCYTES # BLD AUTO: 1.84 K/UL — SIGNIFICANT CHANGE UP (ref 1–3.3)
LYMPHOCYTES # BLD AUTO: 21.7 % — SIGNIFICANT CHANGE UP (ref 13–44)
MCHC RBC-ENTMCNC: 27.6 PG — SIGNIFICANT CHANGE UP (ref 27–34)
MCHC RBC-ENTMCNC: 33.1 GM/DL — SIGNIFICANT CHANGE UP (ref 32–36)
MCV RBC AUTO: 83.4 FL — SIGNIFICANT CHANGE UP (ref 80–100)
MONOCYTES # BLD AUTO: 0.72 K/UL — SIGNIFICANT CHANGE UP (ref 0–0.9)
MONOCYTES NFR BLD AUTO: 8.5 % — SIGNIFICANT CHANGE UP (ref 2–14)
NEUTROPHILS # BLD AUTO: 5.4 K/UL — SIGNIFICANT CHANGE UP (ref 1.8–7.4)
NEUTROPHILS NFR BLD AUTO: 63.5 % — SIGNIFICANT CHANGE UP (ref 43–77)
NITRITE UR-MCNC: NEGATIVE — SIGNIFICANT CHANGE UP
NRBC # BLD: 0 /100 WBCS — SIGNIFICANT CHANGE UP (ref 0–0)
PH UR: 7.5 — SIGNIFICANT CHANGE UP (ref 5–8)
PLATELET # BLD AUTO: 292 K/UL — SIGNIFICANT CHANGE UP (ref 150–400)
POTASSIUM SERPL-MCNC: 3.8 MMOL/L — SIGNIFICANT CHANGE UP (ref 3.5–5.3)
POTASSIUM SERPL-SCNC: 3.8 MMOL/L — SIGNIFICANT CHANGE UP (ref 3.5–5.3)
PROT SERPL-MCNC: 7.4 G/DL — SIGNIFICANT CHANGE UP (ref 6–8.3)
PROT UR-MCNC: ABNORMAL
PROTHROM AB SERPL-ACNC: 14 SEC — HIGH (ref 10.6–13.6)
RBC # BLD: 4.16 M/UL — LOW (ref 4.2–5.8)
RBC # FLD: 14.3 % — SIGNIFICANT CHANGE UP (ref 10.3–14.5)
RBC CASTS # UR COMP ASSIST: 1 /HPF — SIGNIFICANT CHANGE UP (ref 0–4)
RH IG SCN BLD-IMP: NEGATIVE — SIGNIFICANT CHANGE UP
SARS-COV-2 RNA SPEC QL NAA+PROBE: SIGNIFICANT CHANGE UP
SODIUM SERPL-SCNC: 136 MMOL/L — SIGNIFICANT CHANGE UP (ref 135–145)
SP GR SPEC: >1.05 (ref 1.01–1.02)
UROBILINOGEN FLD QL: NEGATIVE — SIGNIFICANT CHANGE UP
WBC # BLD: 8.49 K/UL — SIGNIFICANT CHANGE UP (ref 3.8–10.5)
WBC # FLD AUTO: 8.49 K/UL — SIGNIFICANT CHANGE UP (ref 3.8–10.5)
WBC UR QL: 5 /HPF — SIGNIFICANT CHANGE UP (ref 0–5)

## 2021-07-25 PROCEDURE — 93010 ELECTROCARDIOGRAM REPORT: CPT

## 2021-07-25 PROCEDURE — 74177 CT ABD & PELVIS W/CONTRAST: CPT | Mod: 26,MA

## 2021-07-25 PROCEDURE — 99285 EMERGENCY DEPT VISIT HI MDM: CPT

## 2021-07-25 RX ORDER — PIPERACILLIN AND TAZOBACTAM 4; .5 G/20ML; G/20ML
3.38 INJECTION, POWDER, LYOPHILIZED, FOR SOLUTION INTRAVENOUS EVERY 8 HOURS
Refills: 0 | Status: DISCONTINUED | OUTPATIENT
Start: 2021-07-25 | End: 2021-07-26

## 2021-07-25 RX ORDER — DEXTROSE 50 % IN WATER 50 %
25 SYRINGE (ML) INTRAVENOUS ONCE
Refills: 0 | Status: DISCONTINUED | OUTPATIENT
Start: 2021-07-25 | End: 2021-07-26

## 2021-07-25 RX ORDER — INSULIN LISPRO 100/ML
VIAL (ML) SUBCUTANEOUS EVERY 6 HOURS
Refills: 0 | Status: DISCONTINUED | OUTPATIENT
Start: 2021-07-25 | End: 2021-07-26

## 2021-07-25 RX ORDER — PIPERACILLIN AND TAZOBACTAM 4; .5 G/20ML; G/20ML
3.38 INJECTION, POWDER, LYOPHILIZED, FOR SOLUTION INTRAVENOUS ONCE
Refills: 0 | Status: COMPLETED | OUTPATIENT
Start: 2021-07-25 | End: 2021-07-25

## 2021-07-25 RX ORDER — SODIUM CHLORIDE 9 MG/ML
1000 INJECTION INTRAMUSCULAR; INTRAVENOUS; SUBCUTANEOUS ONCE
Refills: 0 | Status: COMPLETED | OUTPATIENT
Start: 2021-07-25 | End: 2021-07-25

## 2021-07-25 RX ORDER — VANCOMYCIN HCL 1 G
1250 VIAL (EA) INTRAVENOUS ONCE
Refills: 0 | Status: COMPLETED | OUTPATIENT
Start: 2021-07-25 | End: 2021-07-25

## 2021-07-25 RX ORDER — GLUCAGON INJECTION, SOLUTION 0.5 MG/.1ML
1 INJECTION, SOLUTION SUBCUTANEOUS ONCE
Refills: 0 | Status: DISCONTINUED | OUTPATIENT
Start: 2021-07-25 | End: 2021-07-26

## 2021-07-25 RX ORDER — VANCOMYCIN HCL 1 G
1250 VIAL (EA) INTRAVENOUS EVERY 12 HOURS
Refills: 0 | Status: DISCONTINUED | OUTPATIENT
Start: 2021-07-26 | End: 2021-07-26

## 2021-07-25 RX ORDER — DEXTROSE 50 % IN WATER 50 %
12.5 SYRINGE (ML) INTRAVENOUS ONCE
Refills: 0 | Status: DISCONTINUED | OUTPATIENT
Start: 2021-07-25 | End: 2021-07-26

## 2021-07-25 RX ORDER — ONDANSETRON 8 MG/1
4 TABLET, FILM COATED ORAL ONCE
Refills: 0 | Status: COMPLETED | OUTPATIENT
Start: 2021-07-25 | End: 2021-07-26

## 2021-07-25 RX ORDER — DEXTROSE 50 % IN WATER 50 %
15 SYRINGE (ML) INTRAVENOUS ONCE
Refills: 0 | Status: DISCONTINUED | OUTPATIENT
Start: 2021-07-25 | End: 2021-07-26

## 2021-07-25 RX ORDER — VANCOMYCIN HCL 1 G
VIAL (EA) INTRAVENOUS
Refills: 0 | Status: DISCONTINUED | OUTPATIENT
Start: 2021-07-25 | End: 2021-07-26

## 2021-07-25 RX ORDER — SODIUM CHLORIDE 9 MG/ML
1000 INJECTION, SOLUTION INTRAVENOUS
Refills: 0 | Status: DISCONTINUED | OUTPATIENT
Start: 2021-07-25 | End: 2021-07-26

## 2021-07-25 RX ORDER — ACETAMINOPHEN 500 MG
650 TABLET ORAL EVERY 6 HOURS
Refills: 0 | Status: DISCONTINUED | OUTPATIENT
Start: 2021-07-25 | End: 2021-07-26

## 2021-07-25 RX ORDER — INSULIN LISPRO 100/ML
VIAL (ML) SUBCUTANEOUS
Refills: 0 | Status: DISCONTINUED | OUTPATIENT
Start: 2021-07-25 | End: 2021-07-25

## 2021-07-25 RX ORDER — INSULIN LISPRO 100/ML
VIAL (ML) SUBCUTANEOUS AT BEDTIME
Refills: 0 | Status: DISCONTINUED | OUTPATIENT
Start: 2021-07-25 | End: 2021-07-25

## 2021-07-25 RX ADMIN — PIPERACILLIN AND TAZOBACTAM 3.38 GRAM(S): 4; .5 INJECTION, POWDER, LYOPHILIZED, FOR SOLUTION INTRAVENOUS at 13:49

## 2021-07-25 RX ADMIN — PIPERACILLIN AND TAZOBACTAM 25 GRAM(S): 4; .5 INJECTION, POWDER, LYOPHILIZED, FOR SOLUTION INTRAVENOUS at 21:16

## 2021-07-25 RX ADMIN — Medication 166.67 MILLIGRAM(S): at 17:40

## 2021-07-25 RX ADMIN — PIPERACILLIN AND TAZOBACTAM 200 GRAM(S): 4; .5 INJECTION, POWDER, LYOPHILIZED, FOR SOLUTION INTRAVENOUS at 11:48

## 2021-07-25 RX ADMIN — Medication 1: at 18:31

## 2021-07-25 RX ADMIN — SODIUM CHLORIDE 1000 MILLILITER(S): 9 INJECTION INTRAMUSCULAR; INTRAVENOUS; SUBCUTANEOUS at 11:47

## 2021-07-25 NOTE — H&P ADULT - NSHPPHYSICALEXAM_GEN_ALL_CORE
ICU Vital Signs Last 24 Hrs  T(C): 36.8 (25 Jul 2021 10:40), Max: 36.8 (25 Jul 2021 10:40)  T(F): 98.2 (25 Jul 2021 10:40), Max: 98.2 (25 Jul 2021 10:40)  HR: 110 (25 Jul 2021 10:40) (110 - 110)  BP: 128/83 (25 Jul 2021 10:40) (128/83 - 128/83)  RR: 17 (25 Jul 2021 10:40) (17 - 17)  SpO2: 98% (25 Jul 2021 10:40) (98% - 98%)    General: NAD, Lying in bed comfortably  Neuro: A+Ox3, no focal deficits  Resp: No respiratory distress or accessory muscle use. no supplemental O2  Abd: Soft, NT/ND, no rebound/guarding  Back: L flank incision healed well, C/D/I. no erythema or skin changes. L flank nontender. Unable to appreciate fluctuance or induration. No R flank tenderness. no CVAT b/l  : no suprapubic tenderness. voiding  Vascular: All 4 extremities warm and appear well perfused  Skin: Intact, no breakdown  Musculoskeletal: All 4 extremities moving spontaneously, no limitations.

## 2021-07-25 NOTE — H&P ADULT - NSHPLABSRESULTS_GEN_ALL_CORE
Pending 11.5   8.49  )-----------( 639      ( 25 Jul 2021 11:37 )             34.7     BMP pending    Coags pending    Blood Cx: pending  Urine Cx: pending    CT abd/pelvis with IV contrast pending 11.5   8.49  )-----------( 292      ( 25 Jul 2021 11:37 )             34.7     07-25    136  |  98  |  12  ----------------------------<  220<H>  3.8   |  21<L>  |  0.90    Ca    9.6      25 Jul 2021 11:37    TPro  7.4  /  Alb  3.9  /  TBili  0.6  /  DBili  x   /  AST  17  /  ALT  21  /  AlkPhos  142<H>  07-25    PT/INR - ( 25 Jul 2021 13:57 )   PT: 14.0 sec;   INR: 1.18 ratio    PTT - ( 25 Jul 2021 13:57 )  PTT:31.2 sec    Blood Cx: pending  Urine Cx: pending    < from: CT Abdomen and Pelvis w/ IV Cont (07.25.21 @ 13:15) >    IMPRESSION:    3.3 cm exophytic rounded collection extending from the lower pole the left kidney posteriorly slightly decreased in size from recent CT. Resolution of the previously seen air within this collection. Adjacent trace subcapsular/perinephric fluid. No gross perinephric inflammatory change. This process may be related to fluoroscopically guided procedure performed 7/1/2021?    Punctate nonobstructing right kidney calculus.    Pancreatic body/tail mass in keeping with history of pancreatic cancer. Thrombosed splenic vein.    Indeterminate hepatic hypodensities suspicious for metastases with isolated right intrahepatic biliary ductal dilation. Further evaluation with liver MRI is recommended, if not already performed.    Stable indeterminate left adrenal nodule.    < end of copied text >

## 2021-07-25 NOTE — ED ADULT NURSE NOTE - OBJECTIVE STATEMENT
65M to ED c/o hx of pancreatic CA, was due to start chemo on friday but his CT scan showed a fluid collection/abscess around his left kidney. Patient has history of kidney stone and stent to L kidney. Patient was told he needs the fluid drained prior to being able [place] [facility] Patient denies pain, SOB, chest pain, fever. Patient is alert and oriented x4, calm/cooperative, NAD, VSS. 20 gauge IV to L hand placed in ED. Patient is given blanket for warmth.

## 2021-07-25 NOTE — ED PROVIDER NOTE - OBJECTIVE STATEMENT
65 year old male, recent dx of pancreatic cancer, [place] [facility], sent in for drainage of ?L kidney abscess. States previously had kidney stone ~1 mo ago, needed removal and nephrostomy tube insertion, was scheduled to start chemo on Friday but was delayed 2/2 to concern for "fluid around L kidney", was told by Dr. Hoenig to present to [facility] ED for drainage. Denies fevers/chills, abdominal pain/back pain, SOB, N/V/D, dysuria/frequency/hematuria.

## 2021-07-25 NOTE — H&P ADULT - ASSESSMENT
65 year old male with PMHx of DM and nephrolithiasis s/p L PCNL on 7/1/2021, recent dx of pancreatic cancer sent in for drainage of ?L kidney abscess.    Plan:  - Admit to Dr. Hoenig  - Repeat CT Abd/Pelvis with IV contrast  - IR consult for possible drainage of abscess following CT   - Zosyn  - follow up urine and blood cultures  - obtain CBC/BMP/Coags  - Strict I &Os  - DVT ppx  - Incentive Spirometry  - Consistent Carbohydrate Diet/Insulin Sliding Scale    Discussed with Dr. Hoenig, pt and pt's family 65 year old male with PMHx of DM and nephrolithiasis s/p L PCNL on 7/1/2021, recent dx of pancreatic cancer sent in for drainage of L perinephric abscess.     Plan:  - Admit to Dr. Hoenig  - Repeat CT Abd/Pelvis with IV contrast  - IR consult for possible drainage of abscess following CT   - Zosyn  - follow up urine and blood cultures  - obtain CBC/BMP/Coags  - Strict I &Os  - DVT ppx  - Incentive Spirometry  - Consistent Carbohydrate Diet/Insulin Sliding Scale    Discussed with Dr. Hoenig, pt and pt's family 65 year old male with PMHx of DM and nephrolithiasis s/p L PCNL on 7/1/2021, recent dx of pancreatic cancer sent in for drainage of L perinephric abscess.     Plan:  - Admit to Dr. Hoenig  - Repeat CT Abd/Pelvis with IV contrast  - IR consult for possible drainage of abscess  - Zosyn and Vancomycin (had staph bacteremia in May 2021)  - follow up urine and blood cultures  - Strict I &Os  - DVT ppx  - Incentive Spirometry  - Consistent Carbohydrate Diet/Insulin Sliding Scale  - NPOpMN/IVFpMN    Discussed with Dr. Hoenig, pt and pt's family

## 2021-07-25 NOTE — CONSULT NOTE ADULT - SUBJECTIVE AND OBJECTIVE BOX
Vascular & Interventional Radiology Consult Note    Evaluate for Procedure: left paranephric collection    HPI: 65y Male with DM, pancreatic cancer, and nephrolithiasis s/p L PCNL on 7/1/2021. Was planned for port placement at [facility] [place] which was delayed 2/2 concern of possible L kidney abscess. Per H&P, pt had felt generally well. Denied fevers/chills, chest pain, shortness of breath, abdominal pain, flank/back pain, N/V/D, dysuria/frequency/hematuria, or other acute symptom at this time.    At [facility], no fevers or leukocytosis.    Allergies:   Medications (Abx/Cardiac/Anticoagulation/Blood Products)    piperacillin/tazobactam IVPB...: 200 mL/Hr IV Intermittent (07-25 @ 11:48)    Data:  180.3  89.8  T(C): 36.8  HR: 110  BP: 128/83  RR: 17  SpO2: 98%    -WBC 8.49 / HgB 11.5 / Hct 34.7 / Plt 292  -Na 136 / Cl 98 / BUN 12 / Glucose 220  -K 3.8 / CO2 21 / Cr 0.90  -ALT 21 / Alk Phos 142 / T.Bili 0.6    Imaging: reviewed. 7/13 [place] [facility] with air and fluid collection protruding from the left lower pole/interpolar region. This appears to be from the site of recent PCNL. On today's CT, the collection appears more homogenous and without air.     Assessment:   65y Male s/p recent L PCNL with an associated left renal collection. No stigmata of infection.    Plan:  - Findings which would represent post-operative changes rather than small abscess. The collection is also small and would not warrant drainage catheter placement.  - No intervention at this time.  - Abx management per primary.  - If continued concern about port placement in this patient, could consider PICC. Vascular & Interventional Radiology Consult Note    Evaluate for Procedure: left paranephric collection    HPI: 65y Male with DM, pancreatic cancer, and nephrolithiasis s/p L PCNL on 7/1/2021. Was planned for port placement at [facility] which was delayed 2/2 concern of possible L kidney abscess. Per H&P, pt had felt generally well. Denied fevers/chills, chest pain, shortness of breath, abdominal pain, flank/back pain, N/V/D, dysuria/frequency/hematuria, or other acute symptom at this time.    At [facility], no fevers or leukocytosis.    Allergies:   Medications (Abx/Cardiac/Anticoagulation/Blood Products)    piperacillin/tazobactam IVPB...: 200 mL/Hr IV Intermittent (07-25 @ 11:48)    Data:  180.3  89.8  T(C): 36.8  HR: 110  BP: 128/83  RR: 17  SpO2: 98%    -WBC 8.49 / HgB 11.5 / Hct 34.7 / Plt 292  -Na 136 / Cl 98 / BUN 12 / Glucose 220  -K 3.8 / CO2 21 / Cr 0.90  -ALT 21 / Alk Phos 142 / T.Bili 0.6    Imaging: reviewed. 7/[facility] [place] [facility] with air and fluid collection protruding from the left lower pole/interpolar region. This appears to be from the site of recent PCNL. On today's CT, the collection appears more homogenous and without air.     Assessment:   65y Male s/p recent L PCNL with an associated left renal collection. No evidence of infection.    Plan:  - Findings which would represent post-operative changes and/or small abscess.  - Will plan for aspiration Monday to determine etiology of collection prior to initiation of chemotherapy.  - Abx management per primary.  - NPOpMN.  - Place IR procedure order under Dr. Rodriguez.

## 2021-07-25 NOTE — ED ADULT TRIAGE NOTE - CHIEF COMPLAINT QUOTE
sent by urology s/p kidney stone removal; dx with pancreatic ca; has fluid around kidney needs to be removed

## 2021-07-25 NOTE — ED PROVIDER NOTE - PHYSICAL EXAMINATION
Gen - NAD; well-appearing; A+Ox3   HEENT - NCAT, EOMI  Neck - supple  Resp - CTAB  CV -  RRR  Abd - soft, NT, ND; no guarding or rebound  Back - no CVA tenderness b/l  MSK - 5/5 strength and FROM b/l UE and LE  Extrem - no LE edema/erythema/tenderness  Neuro - no focal motor or sensation deficits

## 2021-07-25 NOTE — H&P ADULT - NSICDXPASTMEDICALHX_GEN_ALL_CORE_FT
PAST MEDICAL HISTORY:  Diabetes     Kidney stone on left side     Pancreatic carcinoma incidental finding  on CT 5/22/21  mass w/splenic vein thrombosis

## 2021-07-25 NOTE — CHART NOTE - NSCHARTNOTEFT_GEN_A_CORE
Patient Age: 65    Patient Gender: M    Procedure (including site / side if known): aspiration of left perinephric collection      Diagnosis / Indication: perinephric fluid collection     Interventional Radiology Attending Physician: Jennifer     Ordering Attending Physician: Hoenig     Pertinent Medical History:    PAST MEDICAL & SURGICAL HISTORY:  Diabetes    Kidney stone on left side    Pancreatic carcinoma  incidental finding  on CT 5/22/21  mass w/splenic vein thrombosis    Attention to nephrostomy  5/2021 placed in IR          Pertinent Labs:                            11.5   8.49  )-----------( 292      ( 25 Jul 2021 11:37 )             34.7       07-25    136  |  98  |  12  ----------------------------<  220<H>  3.8   |  21<L>  |  0.90    Ca    9.6      25 Jul 2021 11:37    TPro  7.4  /  Alb  3.9  /  TBili  0.6  /  DBili  x   /  AST  17  /  ALT  21  /  AlkPhos  142<H>  07-25      PT/INR - ( 25 Jul 2021 13:57 )   PT: 14.0 sec;   INR: 1.18 ratio         PTT - ( 25 Jul 2021 13:57 )  PTT:31.2 sec    Patient and Family aware:   [ x ]Y   [  ]N Patient Age: 65    Patient Gender: M    Procedure (including site / side if known): aspiration of left perinephric collection      Diagnosis / Indication: perinephric fluid collection     Interventional Radiology Attending Physician: Jennifer     Ordering Attending Physician: Hoenig     Pertinent Medical History:    PAST MEDICAL & SURGICAL HISTORY:  Diabetes    Kidney stone on left side    Pancreatic carcinoma  incidental finding  on CT 5/22/21  mass w/splenic vein thrombosis    Attention to nephrostomy  5/2021 placed in IR          Pertinent Labs:                            11.5   8.49  )-----------( 292      ( 25 Jul 2021 11:37 )             34.7       07-25    136  |  98  |  12  ----------------------------<  220<H>  3.8   |  21<L>  |  0.90    Ca    9.6      25 Jul 2021 11:37    TPro  7.4  /  Alb  3.9  /  TBili  0.6  /  DBili  x   /  AST  17  /  ALT  21  /  AlkPhos  142<H>  07-25      PT/INR - ( 25 Jul 2021 13:57 )   PT: 14.0 sec;   INR: 1.18 ratio         PTT - ( 25 Jul 2021 13:57 )  PTT:31.2 sec    Patient and Family aware:   [ x ]Y   [  ]N    A/P  65M s/p L PCNL now presenting w/ yeimy-nephric fluid collection   --IR tomorrow for drainage v aspiration   --NPO at midnight  --Holding SQH for procedure

## 2021-07-25 NOTE — ED PROVIDER NOTE - PMH
Diabetes    Kidney stone on left side    Pancreatic carcinoma  incidental finding  on CT 5/22/21  mass w/splenic vein thrombosis

## 2021-07-25 NOTE — ED PROVIDER NOTE - CLINICAL SUMMARY MEDICAL DECISION MAKING FREE TEXT BOX
65 year old male, recent dx of pancreatic cancer, followed at [facility] [place], sent in for drainage of ?L kidney abscess. Appears well here, afebrile, tachycardic but otherwise wnl, exam benign. Will send pre-op labs, CT a/p IV contrast, cultures, tba to urology 65 year old male, recent dx of pancreatic cancer, followed at [facility], sent in for drainage of ?L kidney abscess. Appears well here, afebrile, tachycardic but otherwise wnl, exam benign. Will send pre-op labs, CT a/p IV contrast, cultures, tba to urology [facility]

## 2021-07-25 NOTE — H&P ADULT - HISTORY OF PRESENT ILLNESS
65 year old male with PMHx of DM and nephrolithiasis s/p L PCNL on 7/1/2021, recent dx of pancreatic cancer [place] of [place] [facility] [place] States previously had kidney stone ~1 mo ago, needed removal and nephrostomy tube insertion, was scheduled to start chemo on Friday but was delayed secondary to concern for "fluid around L kidney" and was told by Dr. Hoenig to present to [facility] ED for drainage and IV ABX. Pt had felt generally well. Denies fevers/chills, chest pain, shortness of breath, abdominal pain, flank/back pain, N/V/D, dysuria/frequency/hematuria, or other acute symptom at this time.

## 2021-07-26 ENCOUNTER — TRANSCRIPTION ENCOUNTER (OUTPATIENT)
Age: 66
End: 2021-07-26

## 2021-07-26 VITALS
DIASTOLIC BLOOD PRESSURE: 83 MMHG | RESPIRATION RATE: 18 BRPM | OXYGEN SATURATION: 99 % | TEMPERATURE: 98 F | SYSTOLIC BLOOD PRESSURE: 146 MMHG | HEART RATE: 81 BPM

## 2021-07-26 LAB
A1C WITH ESTIMATED AVERAGE GLUCOSE RESULT: 6.3 % — HIGH (ref 4–5.6)
ANION GAP SERPL CALC-SCNC: 18 MMOL/L — HIGH (ref 5–17)
APTT BLD: 30 SEC — SIGNIFICANT CHANGE UP (ref 27.5–35.5)
BUN SERPL-MCNC: 8 MG/DL — SIGNIFICANT CHANGE UP (ref 7–23)
CALCIUM SERPL-MCNC: 8.6 MG/DL — SIGNIFICANT CHANGE UP (ref 8.4–10.5)
CHLORIDE SERPL-SCNC: 97 MMOL/L — SIGNIFICANT CHANGE UP (ref 96–108)
CO2 SERPL-SCNC: 18 MMOL/L — LOW (ref 22–31)
COVID-19 SPIKE DOMAIN AB INTERP: NEGATIVE — SIGNIFICANT CHANGE UP
COVID-19 SPIKE DOMAIN ANTIBODY RESULT: 0.4 U/ML — SIGNIFICANT CHANGE UP
CREAT SERPL-MCNC: 0.78 MG/DL — SIGNIFICANT CHANGE UP (ref 0.5–1.3)
CULTURE RESULTS: NO GROWTH — SIGNIFICANT CHANGE UP
ESTIMATED AVERAGE GLUCOSE: 134 MG/DL — HIGH (ref 68–114)
GLUCOSE BLDC GLUCOMTR-MCNC: 128 MG/DL — HIGH (ref 70–99)
GLUCOSE BLDC GLUCOMTR-MCNC: 169 MG/DL — HIGH (ref 70–99)
GLUCOSE BLDC GLUCOMTR-MCNC: 175 MG/DL — HIGH (ref 70–99)
GLUCOSE BLDC GLUCOMTR-MCNC: 190 MG/DL — HIGH (ref 70–99)
GLUCOSE BLDC GLUCOMTR-MCNC: 197 MG/DL — HIGH (ref 70–99)
GLUCOSE SERPL-MCNC: 285 MG/DL — HIGH (ref 70–99)
HCT VFR BLD CALC: 27.6 % — LOW (ref 39–50)
HGB BLD-MCNC: 9.1 G/DL — LOW (ref 13–17)
INR BLD: 1.18 RATIO — HIGH (ref 0.88–1.16)
MCHC RBC-ENTMCNC: 28 PG — SIGNIFICANT CHANGE UP (ref 27–34)
MCHC RBC-ENTMCNC: 33 GM/DL — SIGNIFICANT CHANGE UP (ref 32–36)
MCV RBC AUTO: 84.9 FL — SIGNIFICANT CHANGE UP (ref 80–100)
NRBC # BLD: 0 /100 WBCS — SIGNIFICANT CHANGE UP (ref 0–0)
PLATELET # BLD AUTO: 221 K/UL — SIGNIFICANT CHANGE UP (ref 150–400)
POTASSIUM SERPL-MCNC: 3.5 MMOL/L — SIGNIFICANT CHANGE UP (ref 3.5–5.3)
POTASSIUM SERPL-SCNC: 3.5 MMOL/L — SIGNIFICANT CHANGE UP (ref 3.5–5.3)
PROTHROM AB SERPL-ACNC: 14 SEC — HIGH (ref 10.6–13.6)
RBC # BLD: 3.25 M/UL — LOW (ref 4.2–5.8)
RBC # FLD: 14.4 % — SIGNIFICANT CHANGE UP (ref 10.3–14.5)
SARS-COV-2 IGG+IGM SERPL QL IA: 0.4 U/ML — SIGNIFICANT CHANGE UP
SARS-COV-2 IGG+IGM SERPL QL IA: NEGATIVE — SIGNIFICANT CHANGE UP
SODIUM SERPL-SCNC: 133 MMOL/L — LOW (ref 135–145)
SPECIMEN SOURCE: SIGNIFICANT CHANGE UP
WBC # BLD: 6.55 K/UL — SIGNIFICANT CHANGE UP (ref 3.8–10.5)
WBC # FLD AUTO: 6.55 K/UL — SIGNIFICANT CHANGE UP (ref 3.8–10.5)

## 2021-07-26 PROCEDURE — 85610 PROTHROMBIN TIME: CPT

## 2021-07-26 PROCEDURE — 87070 CULTURE OTHR SPECIMN AEROBIC: CPT

## 2021-07-26 PROCEDURE — 99285 EMERGENCY DEPT VISIT HI MDM: CPT | Mod: 25

## 2021-07-26 PROCEDURE — 85027 COMPLETE CBC AUTOMATED: CPT

## 2021-07-26 PROCEDURE — 87086 URINE CULTURE/COLONY COUNT: CPT

## 2021-07-26 PROCEDURE — 83036 HEMOGLOBIN GLYCOSYLATED A1C: CPT

## 2021-07-26 PROCEDURE — 85025 COMPLETE CBC W/AUTO DIFF WBC: CPT

## 2021-07-26 PROCEDURE — 86769 SARS-COV-2 COVID-19 ANTIBODY: CPT

## 2021-07-26 PROCEDURE — 76942 ECHO GUIDE FOR BIOPSY: CPT | Mod: 26

## 2021-07-26 PROCEDURE — 86850 RBC ANTIBODY SCREEN: CPT

## 2021-07-26 PROCEDURE — C1729: CPT

## 2021-07-26 PROCEDURE — 80048 BASIC METABOLIC PNL TOTAL CA: CPT

## 2021-07-26 PROCEDURE — 82962 GLUCOSE BLOOD TEST: CPT

## 2021-07-26 PROCEDURE — 86900 BLOOD TYPING SEROLOGIC ABO: CPT

## 2021-07-26 PROCEDURE — 81001 URINALYSIS AUTO W/SCOPE: CPT

## 2021-07-26 PROCEDURE — 86901 BLOOD TYPING SEROLOGIC RH(D): CPT

## 2021-07-26 PROCEDURE — 96365 THER/PROPH/DIAG IV INF INIT: CPT

## 2021-07-26 PROCEDURE — 76942 ECHO GUIDE FOR BIOPSY: CPT

## 2021-07-26 PROCEDURE — U0003: CPT

## 2021-07-26 PROCEDURE — 87205 SMEAR GRAM STAIN: CPT

## 2021-07-26 PROCEDURE — 87040 BLOOD CULTURE FOR BACTERIA: CPT

## 2021-07-26 PROCEDURE — 80053 COMPREHEN METABOLIC PANEL: CPT

## 2021-07-26 PROCEDURE — 74177 CT ABD & PELVIS W/CONTRAST: CPT

## 2021-07-26 PROCEDURE — 85730 THROMBOPLASTIN TIME PARTIAL: CPT

## 2021-07-26 PROCEDURE — 49406 IMAGE CATH FLUID PERI/RETRO: CPT

## 2021-07-26 PROCEDURE — 96366 THER/PROPH/DIAG IV INF ADDON: CPT

## 2021-07-26 PROCEDURE — 10160 PNXR ASPIR ABSC HMTMA BULLA: CPT

## 2021-07-26 PROCEDURE — 99024 POSTOP FOLLOW-UP VISIT: CPT

## 2021-07-26 PROCEDURE — U0005: CPT

## 2021-07-26 RX ORDER — SENNA PLUS 8.6 MG/1
2 TABLET ORAL AT BEDTIME
Refills: 0 | Status: DISCONTINUED | OUTPATIENT
Start: 2021-07-26 | End: 2021-07-26

## 2021-07-26 RX ORDER — HEPARIN SODIUM 5000 [USP'U]/ML
5000 INJECTION INTRAVENOUS; SUBCUTANEOUS EVERY 8 HOURS
Refills: 0 | Status: DISCONTINUED | OUTPATIENT
Start: 2021-07-26 | End: 2021-07-26

## 2021-07-26 RX ADMIN — SODIUM CHLORIDE 75 MILLILITER(S): 9 INJECTION, SOLUTION INTRAVENOUS at 00:15

## 2021-07-26 RX ADMIN — Medication 166.67 MILLIGRAM(S): at 08:44

## 2021-07-26 RX ADMIN — Medication 1: at 00:13

## 2021-07-26 RX ADMIN — PIPERACILLIN AND TAZOBACTAM 25 GRAM(S): 4; .5 INJECTION, POWDER, LYOPHILIZED, FOR SOLUTION INTRAVENOUS at 05:01

## 2021-07-26 RX ADMIN — Medication 1: at 06:06

## 2021-07-26 RX ADMIN — Medication 1: at 12:03

## 2021-07-26 RX ADMIN — PIPERACILLIN AND TAZOBACTAM 25 GRAM(S): 4; .5 INJECTION, POWDER, LYOPHILIZED, FOR SOLUTION INTRAVENOUS at 12:06

## 2021-07-26 RX ADMIN — SODIUM CHLORIDE 75 MILLILITER(S): 9 INJECTION, SOLUTION INTRAVENOUS at 08:44

## 2021-07-26 NOTE — DISCHARGE NOTE PROVIDER - NSDCMRMEDTOKEN_GEN_ALL_CORE_FT
metFORMIN 500 mg oral tablet: 1 tab(s) orally 2 times a day  oxycodone-acetaminophen 5 mg-325 mg oral tablet: 1 tab(s) orally every 6 hours, As Needed -for severe pain MDD:4 tabs

## 2021-07-26 NOTE — PROGRESS NOTE ADULT - SUBJECTIVE AND OBJECTIVE BOX
IR Post Procedure Note    Diagnosis: L perinephric aspiration    Procedure: L perinephric aspiration    : Eagle Betancur MD    Contrast: None    Anesthesia: 1% Lidocaine Subcutaneous    Estimated Blood Loss: Less than 10cc    Specimens: Identified, Labeled, Confirmed and Sent to Lab.    Complications: No Immediate Complications    Anticoagulation: Resume without Restriction    Findings & Plan: Minimal fluid aspirated from L perinephric collection to completion      Please call Interventional Radiology with any questions, concerns, or issues. 
Post Procedure Check    Pt seen and examined without complaints. s/p L perinephric aspiration by IR.    Vital Signs Last 24 Hrs  T(C): 36.6 (26 Jul 2021 15:29), Max: 37.1 (25 Jul 2021 16:55)  T(F): 97.9 (26 Jul 2021 15:29), Max: 98.8 (25 Jul 2021 21:11)  HR: 81 (26 Jul 2021 15:29) (75 - 88)  BP: 146/83 (26 Jul 2021 15:29) (130/87 - 146/83)  BP(mean): --  RR: 18 (26 Jul 2021 15:29) (16 - 18)  SpO2: 99% (26 Jul 2021 15:29) (95% - 99%)    I&O's Summary    25 Jul 2021 07:01  -  26 Jul 2021 07:00  --------------------------------------------------------  IN: 600 mL / OUT: 0 mL / NET: 600 mL    26 Jul 2021 07:01  -  26 Jul 2021 16:40  --------------------------------------------------------  IN: 900 mL / OUT: 0 mL / NET: 900 mL        Physical Exam  Gen: NAD  Abd: Soft, NT, ND  Back: L flank dressing c/d/i  : wnl                          9.1    6.55  )-----------( 221      ( 26 Jul 2021 07:12 )             27.6       07-26    133<L>  |  97  |  8   ----------------------------<  285<H>  3.5   |  18<L>  |  0.78    Ca    8.6      26 Jul 2021 07:15    TPro  7.4  /  Alb  3.9  /  TBili  0.6  /  DBili  x   /  AST  17  /  ALT  21  /  AlkPhos  142<H>  07-25      A/P: 65y Male s/p L perinephric aspiration by Interventional Radiology    -cont antibiotics  -f/u cultures  -discharge plannnig  
The patient was seen and examined at bedside.  Denies complaints of chest pain, shortness of breath, nausea, acute pain.  No acute events overnight.    T(C): 36.6 (07-26-21 @ 05:51), Max: 37.1 (07-25-21 @ 16:55)  HR: 79 (07-26-21 @ 05:51) (78 - 110)  BP: 143/87 (07-26-21 @ 05:51) (126/80 - 143/87)  RR: 18 (07-26-21 @ 05:51) (16 - 18)  SpO2: 98% (07-26-21 @ 05:51) (95% - 99%)  Wt(kg): --    Physical Exam:    General: NAD, A+Ox3  Abdomen: soft, non-tender, non-distended  Back: wound healed      07-25 @ 07:01  -  07-26 @ 07:00  --------------------------------------------------------  IN: 600 mL / OUT: 0 mL / NET: 600 mL      voiding x2 - unrecorded

## 2021-07-26 NOTE — DISCHARGE NOTE NURSING/CASE MANAGEMENT/SOCIAL WORK - PATIENT PORTAL LINK FT
You can access the FollowMyHealth Patient Portal offered by [facility] by registering at the following website: [email or web address]. By joining [facility]’s FollowMyHealth portal, you will also be able to view your health information using other applications (apps) compatible with our system.

## 2021-07-26 NOTE — DISCHARGE NOTE PROVIDER - CARE PROVIDERS DIRECT ADDRESSES
[email or web address]

## 2021-07-26 NOTE — DISCHARGE NOTE PROVIDER - CARE PROVIDER_API CALL
Hoenig, David M (MD)  Urology  [facility]- Dept. of Urology, [address] [place] [address]  Phone: [phone]  Fax: [phone]  Follow Up Time: 2 weeks

## 2021-07-26 NOTE — DISCHARGE NOTE PROVIDER - HOSPITAL COURSE
65 year old male with PMHx of DM and nephrolithiasis s/p L PCNL on 7/1/2021, recent dx of pancreatic cancer admitted 7/25 for drainage of L kidney  abscess from [facility] [place]. Pt was scheduled to start chemo on Friday but was delayed secondary to concern for "fluid around L kidney".  Pt remained afebrile  during admission, no leukocytosis.  Underwent aspiration of left perinephric fluid by Interventional Radiology 7/26.  Post procedure, pt remains stable.  Stable for discharge

## 2021-07-26 NOTE — PRE-OP CHECKLIST - TAMPON REMOVED
Patient called, she's requesting a prescription for cipro because she has sinus infection and nasal drainage, patient states that this happens twice a year and Dr. Chavez is aware of this. Please send prescription to [facility] on [address].    n/a

## 2021-07-26 NOTE — PROGRESS NOTE ADULT - ASSESSMENT
65 year old male with PMHx of DM and nephrolithiasis s/p L PCNL on 7/1/2021, recent dx of pancreatic cancer sent in for drainage of L perinephric abscess    Plan:    - NPO for IR drainage of the perinephric abscess today  - restart subq heparin after procedure  - Zosyn and Vancomycin (had staph bacteremia in May 2021); check vanco trough when due  - follow up urine and blood cultures  - Consistent Carbohydrate Diet/Insulin Sliding Scale  - OOB, DVT ppx   65 year old male with PMHx of DM and nephrolithiasis s/p L PCNL on 7/1/2021, recent dx of pancreatic cancer sent in for drainage of L perinephric abscess    Plan:    - AM labs, coags  - NPO for IR drainage of the perinephric abscess today  - restart subq heparin after procedure  - Zosyn and Vancomycin (had staph bacteremia in May 2021); check vanco trough when due  - follow up urine and blood cultures  - Consistent Carbohydrate Diet/Insulin Sliding Scale  - OOB, DVT ppx

## 2021-07-26 NOTE — PROGRESS NOTE ADULT - ATTENDING COMMENTS
Pt seen and examined  films reviewed from admission  very small perinephric collection post PCNL and extended course of nephrostomy, all in absence of clinical symptoms; drained by IR to clear potential for small abscess in advance of oncologic treatment of pancreatic neoplasm    cultures will be tracked  cont on abx as per initial culture results  d/c planning

## 2021-07-31 LAB
CULTURE RESULTS: SIGNIFICANT CHANGE UP
SPECIMEN SOURCE: SIGNIFICANT CHANGE UP

## 2021-08-13 ENCOUNTER — APPOINTMENT (OUTPATIENT)
Dept: UROLOGY | Facility: CLINIC | Age: 66
End: 2021-08-13

## 2021-09-14 ENCOUNTER — APPOINTMENT (OUTPATIENT)
Dept: UROLOGY | Facility: CLINIC | Age: 66
End: 2021-09-14
Payer: MEDICARE

## 2021-09-14 DIAGNOSIS — N20.9 URINARY CALCULUS, UNSPECIFIED: ICD-10-CM

## 2021-09-14 DIAGNOSIS — K86.89 OTHER SPECIFIED DISEASES OF PANCREAS: ICD-10-CM

## 2021-09-14 DIAGNOSIS — Z87.442 PERSONAL HISTORY OF URINARY CALCULI: ICD-10-CM

## 2021-09-14 DIAGNOSIS — E11.9 TYPE 2 DIABETES MELLITUS W/OUT COMPLICATIONS: ICD-10-CM

## 2021-09-14 DIAGNOSIS — C25.9 MALIGNANT NEOPLASM OF PANCREAS, UNSPECIFIED: ICD-10-CM

## 2021-09-14 PROCEDURE — 99441: CPT | Mod: 95

## 2021-09-14 NOTE — HISTORY OF PRESENT ILLNESS
[Other Location: e.g. School (Enter Location, City,State)___] : at [unfilled], at the time of the visit. [Other Location: e.g. Home (Enter Location, City,State)___] : at [unfilled] [FreeTextEntry3] : Omar- daughter [FreeTextEntry1] : The patient-doctor relationship has been established in a face to face fashion via real time video/audio HIPAA compliant communication using telemedicine software. The patient's identity has been confirmed. The patient was previously emailed a copy of the telemedicine consent. They have had a chance to review and has now given verbal consent and has requested care to be assessed and treated via telemedicine. They understand there may be limitations in this process, and that they may need further followup care in the office and/or hospital settings.\par \par Verbal consent given on Sep 14 2021 11:20AM TTM only for patient\par \par TSERING SCANLON is a 65 year M who presents for metabolic evaluation and prevention of future stone disease following recent surgery for stone. At issue today is review of the stone analysis, risk factors for future stone episodes and establishing whether they have pure dietary, metabolic, or mixed causes for their stone risks which is unrelated to the surgical management of their stone disease.\par \par They underwent left PCNL on 7/1/21\par \par Stone analysis: 100% Uric acid\par \par Did well. All tubes removed during hosp. No flank or abdominal pain. No fever. No dysuria or hematuria.\par \par Was to have mediport for treatment related to pancreatic lesion, but delayed as [facility] did repeat CT scan with reported ? "abscess left kidney" along percutaneous tract. Had admission and perc drainage at [facility]- cultures neg.  On alkalinization.  No mediport, but receiving chemo and feeling fatigued.\par \par \par 09/14/2021 \par \par The patient denies fevers, chills, nausea and/or vomiting, and no unexplained weight loss.\par \par All pertinent parts of the patient PFSH (past medical, family, and social histories), laboratory, radiological studies and available physician notes were reviewed prior to starting the face-to-face portion of the telemedicine visit. Questionnaire results, where appropriate, were discussed with the patient.\par  [Fatigue] : fatigue

## 2021-11-09 NOTE — PROGRESS NOTE ADULT - PROBLEM SELECTOR PLAN 1
Found on CT imaging, two stones, obstructing, hx of nephrolithiasis   - s/p Left PCN and abscess drainage with IR  - I and O   - monitor output from nephrostomy tube  - f/u with US to assess status s/p NT  - Flush drain with 5cc NS daily forward only; DO NOT aspirate  - Change dressing q3 days or when dressing is saturated.  - Urology f/u for stone management. No

## 2021-12-14 NOTE — DISCHARGE NOTE PROVIDER - NSDCQMERRANDS_GEN_ALL_CORE
Samples Given: CeraVe cleansers and creams
Initiate Treatment: HC valerate cream twice daily to rash
Detail Level: Zone
No

## 2022-04-16 ENCOUNTER — RX RENEWAL (OUTPATIENT)
Age: 67
End: 2022-04-16

## 2022-05-23 ENCOUNTER — APPOINTMENT (OUTPATIENT)
Dept: RADIATION ONCOLOGY | Facility: CLINIC | Age: 67
End: 2022-05-23
Payer: MEDICARE

## 2022-05-23 PROCEDURE — 99215 OFFICE O/P EST HI 40 MIN: CPT | Mod: GC,95

## 2022-05-23 RX ORDER — SERTRALINE HYDROCHLORIDE 50 MG/1
50 TABLET, FILM COATED ORAL
Refills: 0 | Status: ACTIVE | COMMUNITY

## 2022-05-23 RX ORDER — POTASSIUM CITRATE 15 MEQ/1
15 MEQ TABLET, EXTENDED RELEASE ORAL
Qty: 180 | Refills: 3 | Status: DISCONTINUED | COMMUNITY
Start: 2021-07-20 | End: 2022-05-23

## 2022-05-23 NOTE — VITALS
[Maximal Pain Intensity: 0/10] : 0/10 [Least Pain Intensity: 0/10] : 0/10 [Patient Refusal] : Patient refused psychosocial distress assessment [10 - Extreme Distress] : Distress Level: 10

## 2022-05-24 RX ORDER — POLYETHYLENE GLYCOL 3350 17 G/17G
17 POWDER, FOR SOLUTION ORAL
Qty: 238 | Refills: 0 | Status: DISCONTINUED | COMMUNITY
Start: 2022-04-01

## 2022-05-24 RX ORDER — PROCHLORPERAZINE MALEATE 10 MG/1
10 TABLET ORAL
Qty: 120 | Refills: 0 | Status: DISCONTINUED | COMMUNITY
Start: 2021-11-22

## 2022-05-24 RX ORDER — TAMSULOSIN HYDROCHLORIDE 0.4 MG/1
0.4 CAPSULE ORAL
Qty: 30 | Refills: 0 | Status: DISCONTINUED | COMMUNITY
Start: 2022-03-02

## 2022-05-24 RX ORDER — SERTRALINE 25 MG/1
25 TABLET, FILM COATED ORAL
Qty: 30 | Refills: 0 | Status: DISCONTINUED | COMMUNITY
Start: 2022-04-22

## 2022-05-24 RX ORDER — METOCLOPRAMIDE 10 MG/1
10 TABLET ORAL
Qty: 120 | Refills: 0 | Status: DISCONTINUED | COMMUNITY
Start: 2022-04-22

## 2022-05-24 RX ORDER — ONDANSETRON 8 MG/1
8 TABLET ORAL
Qty: 90 | Refills: 0 | Status: DISCONTINUED | COMMUNITY
Start: 2022-03-25

## 2022-05-24 RX ORDER — FAMOTIDINE 40 MG/5ML
40 POWDER, FOR SUSPENSION ORAL
Qty: 150 | Refills: 0 | Status: ACTIVE | COMMUNITY
Start: 2022-05-04

## 2022-05-24 RX ORDER — LORAZEPAM 0.5 MG/1
0.5 TABLET ORAL
Qty: 30 | Refills: 0 | Status: DISCONTINUED | COMMUNITY
Start: 2022-03-25

## 2022-05-24 RX ORDER — POTASSIUM CHLORIDE 750 MG/1
10 TABLET, FILM COATED, EXTENDED RELEASE ORAL
Qty: 180 | Refills: 0 | Status: DISCONTINUED | COMMUNITY
Start: 2022-04-15

## 2022-05-24 NOTE — REVIEW OF SYSTEMS
[Fatigue] : fatigue [Recent Change In Weight] : ~T recent weight change [Vomiting] : vomiting [Joint Pain] : joint pain [Muscle Pain] : muscle pain [Muscle Weakness] : muscle weakness [Disturbance Of Gait] : gait disturbance [Depression] : depression [Negative] : Allergic/Immunologic [Fever] : no fever [Chills] : no chills [Night Sweats] : no night sweats [Abdominal Pain] : no abdominal pain [Constipation] : no constipation [Diarrhea] : no diarrhea [Suicidal] : not suicidal [Insomnia] : no insomnia [Anxiety] : no anxiety

## 2022-05-24 NOTE — DISEASE MANAGEMENT
[Clinical] : TNM Stage: c [N/A] : Currently not applicable [FreeTextEntry4] : pancreatic adeno [TTNM] : x [NTNM] : x [MTNM] : 1

## 2022-05-24 NOTE — HISTORY OF PRESENT ILLNESS
[Home] : at home, [unfilled] , at the time of the visit. 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 He underwent treatment for uro sepsis / hydronephrosis secondary to kidney stones followed by neoadjuvant folfirinox with doctor Charu. \par At presentation he also had concern for a left adrenal nonspecific finding 1.5 cm. \par May 2021 \par 2. Pancreatic body, mass, biopsy: - Adenocarcinoma, moderately differentiated, see note. - Additional levels (x3) examined. Note: Part (2) diagnosis reviewed in agreement by intradepartmental consultation. \par \par 7/2021 – most recent scan available. \par Pancreatic body/tail mass 5cm in keeping with history of pancreatic cancer. Thrombosed splenic vein.\par \par Indeterminate hepatic hypodensities suspicious for metastases with isolated right intrahepatic biliary ductal dilation. Additional 2.7 x 1.9 cm the pancreatic prominent lymph node versus tumor implant is again seen (2:36), unchanged. No main pancreatic duct dilation.\par ADRENALS: Stable indeterminate left adrenal gland nodule measuring up to 1.6 cm. Right adrenal gland is normal. Subcentimeter segment 2, 8 and 3 ill-defined hypodensities with isolated segment 8 intrahepatic biliary ductal dilation (2:20, 25, 29), \par \par CHEMO at [facility] [place], [place], changed to immuno, may change back to chemo given progression. \par

## 2022-07-16 NOTE — H&P PST ADULT - NEGATIVE GENERAL GENITOURINARY SYMPTOMS
Problem: Discharge Planning  Goal: Discharge to home or other facility with appropriate resources  Outcome: Progressing  Flowsheets (Taken 7/16/2022 1936)  Discharge to home or other facility with appropriate resources:   Identify barriers to discharge with patient and caregiver   Arrange for needed discharge resources and transportation as appropriate     Problem: Pain  Goal: Verbalizes/displays adequate comfort level or baseline comfort level  Outcome: Progressing  Flowsheets (Taken 7/16/2022 1936)  Verbalizes/displays adequate comfort level or baseline comfort level:   Encourage patient to monitor pain and request assistance   Assess pain using appropriate pain scale  Note: Pain managed with pharmacologic and non-pharmacologic interventions during this shift. Will continue to monitor and assess for needs and change in patient condition. Problem: Safety - Adult  Goal: Free from fall injury  Outcome: Progressing  Flowsheets (Taken 7/16/2022 1936)  Free From Fall Injury: Instruct family/caregiver on patient safety  Note: Patient remains free from falls during this shift. Fall precautions remain in place. Patient educated on the need to implement call light use prior to ambulation. Will continue to monitor and assess. no hematuria/no flank pain L/no flank pain R/no urine discoloration/no dysuria

## 2022-08-18 NOTE — H&P PST ADULT - BACK
detailed exam Drysol Counseling:  I discussed with the patient the risks of drysol/aluminum chloride including but not limited to skin rash, itching, irritation, burning.

## 2023-02-23 NOTE — DISCHARGE NOTE PROVIDER - NSDCHHPTASSISTHOME_GEN_ALL_CORE
Pulmonary Critical Care Progress Note  Surinder Urbina CNP/Elba Tijerina MD     Patient seen for the follow up of  Pulmonary embolism on left [facility])     Subjective:  Patient had uneventful night. Resting comfortably in bed, no distress. He has been weaned down to oxygen at 3 L nasal cannula. He feels his shortness of breath is improved. He has occasional loose cough, no hemoptysis. He denies any chest pain. He is tolerating orals. Examination:  Vitals: /71   Pulse 72   Temp 97.3 °F (36.3 °C) (Oral)   Resp 17   Ht 5' 10\" (1.778 m)   Wt 195 lb (88.5 kg)   SpO2 96%   BMI 27.98 kg/m²   General appearance: alert and cooperative with exam  Neck: No JVD  Lungs: Decreased air exchange, no crackles or wheezing  Heart: regular rate and rhythm, S1, S2 normal, no gallop  Abdomen: Soft, non tender, + BS  Extremities: no cyanosis or clubbing. No significant edema      Radiology:  X-ray chest 2/20/2023      CTA 2/15/2023      Impression:  Acute hypoxic respiratory failure  Acute PE, tiny distal posterior basilar left lower lobe segmental pulmonary artery  Acute right leg DVT of femoral artery  Acute exacerbation of COPD/emphysema  Small right pleural effusion/atelectasis  Retained secretions in trachea and mainstem bronchi,?   Aspiration  30-pack-year smoking history  Indeterminate 9 mm lingular nodule  Possible obstructive sleep apnea  Recent history of COVID-19  Dilated patulous esophagus with wall thickening/small to moderate hiatal hernia    Recommendations:  Oxygen via nasal cannula, wean as able to keep SPO2 90% or greater  BiPAP support while asleep if necessary  Incentive spirometry every hour while awake   Acapella  Mucinex  Prednisone 30 mg daily, taper by 10 mg every 4 days to stop  Completed course of Augmentin/Unasyn  Diuresis with oral Bumex  Continue budesonide and Brovana via nebulizer twice daily  Albuterol and Ipratropium twice daily and as needed  Speech therapy evaluation noted, patient okay for normal diet  DVT prophylaxis, Eliquis  GI prophylaxis, Protonix  PT/OT  Discharge planning to rehab when pre-CERT obtained  Will follow with you    Electronically signed by     ALEXSANDRA Ceballos CNP on 2/23/2023 at 9:36 AM  Pulmonary Critical Care and Sleep Medicine,  [facility] AT [place]: [phone] Patient Needs Assistance to Leave Residence...

## 2023-02-25 NOTE — ASSESSMENT
[FreeTextEntry1] : Will await f/u imaging for pancreas ([facility]) to re-eval kidneys.\par \par Pt o/w doing well, but for the debilitation of current chemo.\par \par Will f/u with labs and imaging once able- o/w stable at this time.
negative...

## 2023-10-01 PROBLEM — K86.89 MASS OF PANCREAS: Status: ACTIVE | Noted: 2021-05-31

## 2024-02-15 NOTE — DISCHARGE NOTE NURSING/CASE MANAGEMENT/SOCIAL WORK - NSTRANSFERBELONGINGSDISPO_GEN_A_NUR
Pt arrives with c/o body aches, runny nose and cough for the last 2 weeks. Pt coming from heart failure clinic and has had a weight increase recently.   
with patient
